# Patient Record
Sex: FEMALE | Race: WHITE | Employment: OTHER | URBAN - METROPOLITAN AREA
[De-identification: names, ages, dates, MRNs, and addresses within clinical notes are randomized per-mention and may not be internally consistent; named-entity substitution may affect disease eponyms.]

---

## 2017-02-13 ENCOUNTER — APPOINTMENT (OUTPATIENT)
Dept: CT IMAGING | Age: 82
End: 2017-02-13
Attending: EMERGENCY MEDICINE
Payer: MEDICARE

## 2017-02-13 ENCOUNTER — HOSPITAL ENCOUNTER (EMERGENCY)
Age: 82
Discharge: HOME | End: 2017-02-14
Attending: EMERGENCY MEDICINE
Payer: MEDICARE

## 2017-02-13 DIAGNOSIS — Z23 IMMUNIZATION, TETANUS TOXOID: ICD-10-CM

## 2017-02-13 DIAGNOSIS — S01.01XA SCALP LACERATION, INITIAL ENCOUNTER: Primary | ICD-10-CM

## 2017-02-13 PROCEDURE — 70450 CT HEAD/BRAIN W/O DYE: CPT

## 2017-02-13 PROCEDURE — 90471 IMMUNIZATION ADMIN: CPT | Performed by: EMERGENCY MEDICINE

## 2017-02-13 PROCEDURE — 77030008462 HC STPLR SKN PROX J&J -A

## 2017-02-13 PROCEDURE — 77030018836 HC SOL IRR NACL ICUM -A

## 2017-02-13 PROCEDURE — 99283 EMERGENCY DEPT VISIT LOW MDM: CPT | Performed by: EMERGENCY MEDICINE

## 2017-02-13 PROCEDURE — 75810000293 HC SIMP/SUPERF WND  RPR: Performed by: EMERGENCY MEDICINE

## 2017-02-14 VITALS
HEART RATE: 78 BPM | OXYGEN SATURATION: 100 % | BODY MASS INDEX: 27.96 KG/M2 | SYSTOLIC BLOOD PRESSURE: 135 MMHG | WEIGHT: 168 LBS | DIASTOLIC BLOOD PRESSURE: 66 MMHG | RESPIRATION RATE: 16 BRPM | TEMPERATURE: 97.5 F

## 2017-02-14 PROCEDURE — 90715 TDAP VACCINE 7 YRS/> IM: CPT | Performed by: EMERGENCY MEDICINE

## 2017-02-14 PROCEDURE — 74011250636 HC RX REV CODE- 250/636: Performed by: EMERGENCY MEDICINE

## 2017-02-14 PROCEDURE — 75810000293 HC SIMP/SUPERF WND  RPR: Performed by: EMERGENCY MEDICINE

## 2017-02-14 RX ADMIN — TETANUS TOXOID, REDUCED DIPHTHERIA TOXOID AND ACELLULAR PERTUSSIS VACCINE, ADSORBED 0.5 ML: 5; 2.5; 8; 8; 2.5 SUSPENSION INTRAMUSCULAR at 00:05

## 2017-02-14 NOTE — DISCHARGE INSTRUCTIONS
Cuts Closed With Staples: Care Instructions  Your Care Instructions  A cut can happen anywhere on your body. The doctor used staples to close the cut. Staples easily and quickly close a cut, which helps the cut heal.  Sometimes a cut can injure tendons, blood vessels, or nerves. If the cut went deep and through the skin, the doctor may have put in a layer of stitches below the staples. The deeper layer of stitches brings the deep part of the cut together. These stitches will dissolve and don't need to be removed. The staples in the upper layer are what you see on the cut. You may have a bandage. You will need to have the staples removed, usually in 7 to 14 days. The doctor has checked you carefully, but problems can develop later. If you notice any problems or new symptoms, get medical treatment right away. Follow-up care is a key part of your treatment and safety. Be sure to make and go to all appointments, and call your doctor if you are having problems. It's also a good idea to know your test results and keep a list of the medicines you take. How can you care for yourself at home? · Keep the cut dry for the first 24 to 48 hours. After this, you can shower if your doctor okays it. Pat the cut dry. · Don't soak the cut, such as in a bathtub. Your doctor will tell you when it's safe to get the cut wet. · If your doctor told you how to care for your cut, follow your doctor's instructions. If you did not get instructions, follow this general advice:  ¨ After the first 24 to 48 hours, wash around the cut with clean water 2 times a day. Don't use hydrogen peroxide or alcohol, which can slow healing. ¨ You may cover the cut with a thin layer of petroleum jelly, such as Vaseline, and a nonstick bandage. ¨ Apply more petroleum jelly and replace the bandage as needed. · Avoid any activity that could cause your cut to reopen. · Do not remove the staples on your own.  Your doctor will tell you when to come back to have the staples removed. · Take pain medicines exactly as directed. ¨ If the doctor gave you a prescription medicine for pain, take it as prescribed. ¨ If you are not taking a prescription pain medicine, ask your doctor if you can take an over-the-counter medicine. When should you call for help? Call your doctor now or seek immediate medical care if:  · You have new pain, or your pain gets worse. · The skin near the cut is cold or pale or changes color. · You have tingling, weakness, or numbness near the cut. · The cut starts to bleed, and blood soaks through the bandage. Oozing small amounts of blood is normal.  · You have trouble moving the area near the cut. · You have symptoms of infection, such as:  ¨ Increased pain, swelling, warmth, or redness around the cut. ¨ Red streaks leading from the cut. ¨ Pus draining from the cut. ¨ A fever. Watch closely for changes in your health, and be sure to contact your doctor if:  · You do not get better as expected. Where can you learn more? Go to http://wilfredo-abhishek.info/. Enter X322 in the search box to learn more about \"Cuts Closed With Staples: Care Instructions. \"  Current as of: May 27, 2016  Content Version: 11.1  © 8316-7559 [employer]. Care instructions adapted under license by [employer] (which disclaims liability or warranty for this information). If you have questions about a medical condition or this instruction, always ask your healthcare professional. [place] [address] any warranty or liability for your use of this information.

## 2017-02-14 NOTE — ED NOTES
Discharge instructions discussed and given:  Pt advised understanding of instructions. Pt ambulated with daughter from ER.

## 2017-02-14 NOTE — ED TRIAGE NOTES
Pt reports hitting her head on the edge of the fireplace. Pt with laceration to right side of head. Pt bleeding in triage.     Brook Fleischer, RN

## 2017-02-14 NOTE — ED PROVIDER NOTES
HPI Comments: Patient states that around 8 PM she fell up against a fireplace and sustained a laceration to her scalp. She denies any loss of consciousness or dizziness or confusion. There was a moderate amount of bleeding which is now controlled. She has some mild pain at the site of the cut but otherwise has no pain. She cannot remember when her last tetanus shot was. Elements of this note were created using speech recognition software. As such, there may be errors of speech recognition present. Patient is a 80 y.o. female presenting with fall. The history is provided by the patient. Fall   Pertinent negatives include no fever, no nausea and no vomiting. Past Medical History:   Diagnosis Date    Anemia      EGD 10-3-12:  schatzki's ring, duodenal ulcer, gastritis. Colonoscopy 10-3-12: diverticulosis, colon polyp. repeat colonoscopy 10 years    HTN (hypertension), benign     Hypercholesteremia     Migraine        Past Surgical History:   Procedure Laterality Date    Hx hysterectomy      Hx refractive surgery      Hx back surgery      Hx orthopaedic       right wrist, left shoulder    Hx tonsillectomy      Endoscopy, colon, diagnostic  10/3/12     diverticulosis, colon polyp    Hx appendectomy           Family History:   Problem Relation Age of Onset    Cancer Sister      breast       Social History     Social History    Marital status:      Spouse name: N/A    Number of children: N/A    Years of education: N/A     Occupational History    Not on file. Social History Main Topics    Smoking status: Never Smoker    Smokeless tobacco: Never Used    Alcohol use No    Drug use: No    Sexual activity: Not on file     Other Topics Concern    Not on file     Social History Narrative    No family/social/cultural issues pertinent to care.  May 2015         ALLERGIES: Codeine and Penicillins    Review of Systems   Constitutional: Negative for chills and fever. Gastrointestinal: Negative for nausea and vomiting. All other systems reviewed and are negative. Vitals:    02/13/17 2136   BP: 158/80   Pulse: 71   Resp: 16   Temp: 97.8 °F (36.6 °C)   SpO2: 98%   Weight: 76.2 kg (168 lb)            Physical Exam   Constitutional: She is oriented to person, place, and time. She appears well-developed and well-nourished. HENT:   Head: Normocephalic. 3 cm linear laceration right parietal region as indicated   Eyes: Conjunctivae are normal. Pupils are equal, round, and reactive to light. Neck: Normal range of motion. Neck supple. Musculoskeletal: She exhibits no edema or tenderness. Neurological: She is alert and oriented to person, place, and time. Skin: Skin is warm and dry. Psychiatric: She has a normal mood and affect. Her behavior is normal.   Nursing note and vitals reviewed. MDM  Number of Diagnoses or Management Options  Immunization, tetanus toxoid:   Scalp laceration, initial encounter: new and does not require workup  Diagnosis management comments: Differential diagnoses: Laceration, skull fracture, traumatic intracranial bleed       Amount and/or Complexity of Data Reviewed  Tests in the radiology section of CPT®: ordered and reviewed  Independent visualization of images, tracings, or specimens: yes    Risk of Complications, Morbidity, and/or Mortality  Presenting problems: moderate  Diagnostic procedures: moderate  Management options: moderate    Patient Progress  Patient progress: improved    ED Course       Wound Repair  Date/Time: 2/14/2017 12:12 AM  Performed by: attendingPreparation: skin prepped with ChloraPrep  Pre-procedure re-eval: Immediately prior to the procedure, the patient was reevaluated and found suitable for the planned procedure and any planned medications. Time out: Immediately prior to the procedure a time out was called to verify the correct patient, procedure, equipment, staff and marking as appropriate. .  Location details: scalp  Wound length:2.6 - 7.5 cm  Foreign bodies: no foreign bodies  Irrigation solution: saline  Irrigation method: syringe  Debridement: none  Skin closure: staples  Number of sutures: 2  Technique: simple  Approximation: close  Dressing: 4x4 and antibiotic ointment  Patient tolerance: Patient tolerated the procedure well with no immediate complications  My total time at bedside, performing this procedure was 1-15 minutes.

## 2017-02-15 ENCOUNTER — PATIENT OUTREACH (OUTPATIENT)
Dept: CASE MANAGEMENT | Age: 82
End: 2017-02-15

## 2017-02-15 NOTE — PROGRESS NOTES
ZENIA #1. ED D/C 2/14/17. Attempted call to patient no answer left vmail requesting return call. Scheduled f/u call for 2/16/17. Benjamin Orourke, GISELE/ Care Coordinator  [address] [place]  / [place], [address] [facility]  [email or web address]     This note will not be viewable in [employer]Thart.

## 2017-02-16 ENCOUNTER — PATIENT OUTREACH (OUTPATIENT)
Dept: CASE MANAGEMENT | Age: 82
End: 2017-02-16

## 2017-02-16 NOTE — PROGRESS NOTES
ZENIA #2. Attempted call to patient no answer left vmail requesting return call. 2nd number is disconnected. Scheduled f/u call for 2/17/17. Page GISELE Rollins/ Care Coordinator  [address]. [place] [address] / [place], [address] [facility]  [email or web address]  This note will not be viewable in [employer].

## 2018-07-17 ENCOUNTER — HOSPITAL ENCOUNTER (OUTPATIENT)
Dept: PHYSICAL THERAPY | Age: 83
Discharge: HOME | End: 2018-07-17
Payer: MEDICARE

## 2018-07-17 ENCOUNTER — HOSPITAL ENCOUNTER (OUTPATIENT)
Dept: SURGERY | Age: 83
Discharge: HOME | End: 2018-07-17
Payer: MEDICARE

## 2018-07-17 ENCOUNTER — ANESTHESIA EVENT (OUTPATIENT)
Dept: SURGERY | Age: 83
DRG: 470 | End: 2018-07-17
Payer: MEDICARE

## 2018-07-17 VITALS
HEIGHT: 66 IN | SYSTOLIC BLOOD PRESSURE: 141 MMHG | OXYGEN SATURATION: 96 % | RESPIRATION RATE: 16 BRPM | DIASTOLIC BLOOD PRESSURE: 72 MMHG | BODY MASS INDEX: 28.93 KG/M2 | WEIGHT: 180 LBS | TEMPERATURE: 95.8 F | HEART RATE: 63 BPM

## 2018-07-17 LAB
ANION GAP SERPL CALC-SCNC: 11 MMOL/L (ref 7–16)
APPEARANCE UR: ABNORMAL
APTT PPP: 30.2 SEC (ref 23.2–35.3)
ATRIAL RATE: 59 BPM
BACTERIA SPEC CULT: NORMAL
BACTERIA URNS QL MICRO: 0 /HPF
BILIRUB UR QL: NEGATIVE
BUN SERPL-MCNC: 16 MG/DL (ref 8–23)
CALCIUM SERPL-MCNC: 8.9 MG/DL (ref 8.3–10.4)
CALCULATED P AXIS, ECG09: 51 DEGREES
CALCULATED R AXIS, ECG10: 49 DEGREES
CALCULATED T AXIS, ECG11: -16 DEGREES
CASTS URNS QL MICRO: ABNORMAL /LPF
CHLORIDE SERPL-SCNC: 105 MMOL/L (ref 98–107)
CO2 SERPL-SCNC: 24 MMOL/L (ref 21–32)
COLOR UR: YELLOW
CREAT SERPL-MCNC: 1.13 MG/DL (ref 0.6–1)
DIAGNOSIS, 93000: NORMAL
EPI CELLS #/AREA URNS HPF: ABNORMAL /HPF
ERYTHROCYTE [DISTWIDTH] IN BLOOD BY AUTOMATED COUNT: 15.5 % (ref 11.9–14.6)
GLUCOSE SERPL-MCNC: 178 MG/DL (ref 65–100)
GLUCOSE UR STRIP.AUTO-MCNC: NEGATIVE MG/DL
HCT VFR BLD AUTO: 40.6 % (ref 35.8–46.3)
HGB BLD-MCNC: 13 G/DL (ref 11.7–15.4)
HGB UR QL STRIP: NEGATIVE
INR PPP: 1
KETONES UR QL STRIP.AUTO: NEGATIVE MG/DL
LEUKOCYTE ESTERASE UR QL STRIP.AUTO: ABNORMAL
MCH RBC QN AUTO: 27.7 PG (ref 26.1–32.9)
MCHC RBC AUTO-ENTMCNC: 32 G/DL (ref 31.4–35)
MCV RBC AUTO: 86.4 FL (ref 79.6–97.8)
NITRITE UR QL STRIP.AUTO: NEGATIVE
P-R INTERVAL, ECG05: 210 MS
PH UR STRIP: 5 [PH] (ref 5–9)
PLATELET # BLD AUTO: 235 K/UL (ref 150–450)
PMV BLD AUTO: 11.2 FL (ref 10.8–14.1)
POTASSIUM SERPL-SCNC: 3.9 MMOL/L (ref 3.5–5.1)
PROT UR STRIP-MCNC: ABNORMAL MG/DL
PROTHROMBIN TIME: 12.9 SEC (ref 11.5–14.5)
Q-T INTERVAL, ECG07: 436 MS
QRS DURATION, ECG06: 88 MS
QTC CALCULATION (BEZET), ECG08: 431 MS
RBC # BLD AUTO: 4.7 M/UL (ref 4.05–5.25)
RBC #/AREA URNS HPF: ABNORMAL /HPF
SERVICE CMNT-IMP: NORMAL
SODIUM SERPL-SCNC: 140 MMOL/L (ref 136–145)
SP GR UR REFRACTOMETRY: 1.03 (ref 1–1.02)
UROBILINOGEN UR QL STRIP.AUTO: 0.2 EU/DL (ref 0.2–1)
VENTRICULAR RATE, ECG03: 59 BPM
WBC # BLD AUTO: 8 K/UL (ref 4.3–11.1)
WBC URNS QL MICRO: ABNORMAL /HPF

## 2018-07-17 PROCEDURE — 81001 URINALYSIS AUTO W/SCOPE: CPT | Performed by: ORTHOPAEDIC SURGERY

## 2018-07-17 PROCEDURE — 77030027138 HC INCENT SPIROMETER -A

## 2018-07-17 PROCEDURE — G8980 MOBILITY D/C STATUS: HCPCS

## 2018-07-17 PROCEDURE — 36415 COLL VENOUS BLD VENIPUNCTURE: CPT | Performed by: ORTHOPAEDIC SURGERY

## 2018-07-17 PROCEDURE — 80048 BASIC METABOLIC PNL TOTAL CA: CPT | Performed by: ORTHOPAEDIC SURGERY

## 2018-07-17 PROCEDURE — 93005 ELECTROCARDIOGRAM TRACING: CPT | Performed by: ORTHOPAEDIC SURGERY

## 2018-07-17 PROCEDURE — 85027 COMPLETE CBC AUTOMATED: CPT | Performed by: ORTHOPAEDIC SURGERY

## 2018-07-17 PROCEDURE — 87641 MR-STAPH DNA AMP PROBE: CPT | Performed by: ORTHOPAEDIC SURGERY

## 2018-07-17 PROCEDURE — 85610 PROTHROMBIN TIME: CPT | Performed by: ORTHOPAEDIC SURGERY

## 2018-07-17 PROCEDURE — 97161 PT EVAL LOW COMPLEX 20 MIN: CPT

## 2018-07-17 PROCEDURE — 85730 THROMBOPLASTIN TIME PARTIAL: CPT | Performed by: ORTHOPAEDIC SURGERY

## 2018-07-17 PROCEDURE — G8978 MOBILITY CURRENT STATUS: HCPCS

## 2018-07-17 PROCEDURE — G8979 MOBILITY GOAL STATUS: HCPCS

## 2018-07-17 NOTE — PROGRESS NOTES
07/17/18 1200   Oxygen Therapy   O2 Sat (%) 96 %   Pulse via Oximetry 68 beats per minute   O2 Device Room air   Pre-Treatment   Breath Sounds Bilateral Clear   Pre FEV1 (liters) 1.5 liters   % Predicted 78   Incentive Spirometry Treatment   Actual Volume (ml) 1200 ml     Initial respiratory Assessment completed with pt. Pt was interviewed and evaluated in Joint camp prior to surgery. Patient ID:  Essie Alpers  [id number]  89 y.o.  11/25/1928  Surgeon: Dr. Wilmer Cosby  Date of Surgery: 8/7/2018  Procedure: Total Left Hip Arthroplasty  Primary Care Physician: Bashir Guallpa -[phone]  Specialists:                                  Pt instructed in the use of Incentive Spirometry. Pt instructed to bring Incentive Spirometer back on date of surgery & to start using Is upon return to pt room. Pt taught proper cough technique    History of smoking:   NONE                                                       Quit date:           Secondhand smoke:BROTHER      Past procedures with Oxygen desaturation:NONE    Past Medical History:   Diagnosis Date    Anemia     EGD 10-3-12:  schatzki's ring, duodenal ulcer, gastritis. Colonoscopy 10-3-12: diverticulosis, colon polyp.   repeat colonoscopy 10 years    HTN (hypertension), benign     Hypercholesteremia     Migraine                                                                                                                                                      Respiratory history:DENIES SOB                                                                  Respiratory meds:                                         FAMILY PRESENT:                                                            NO                                        PAST SLEEP STUDY:                          NO  HX OF RAKESH:                                           NO                                     RAKESH assessment:                                               SLEEPS ON SIDE PHYSICAL EXAM   Body mass index is 29.05 kg/(m^2).    Visit Vitals    /72 (BP 1 Location: Left arm, BP Patient Position: Sitting)    Pulse 63    Temp 95.8 °F (35.4 °C)    Resp 16    Ht 5' 6\" (1.676 m)    Wt 81.6 kg (180 lb)    SpO2 96%    BMI 29.05 kg/m2     Neck circumference:  38.5    cm    Loud snoring:                                     DENIES    Witnessed apnea or wakening gasping or choking:,             DENIES,                                                                                                  Awakens with headaches:                                                  DENIES- USED TO HAVE MIGRAINES    Morning or daytime tiredness/ sleepiness:                    DENIES                                                                                        TIRED   Dry mouth or sore throat in morning:                                                                                       DENIES    [place] stage:  4    SACS score:5    STOP/BAN                              CPAP:                       NONE                                                  NONE  Referrals:    Pt. Phone Number:

## 2018-07-17 NOTE — ANESTHESIA PREPROCEDURE EVALUATION
Anesthetic History   No history of anesthetic complications            Review of Systems / Medical History  Patient summary reviewed and pertinent labs reviewed    Pulmonary  Within defined limits                 Neuro/Psych         Headaches and psychiatric history (Depression)     Cardiovascular    Hypertension: well controlled          Hyperlipidemia    Exercise tolerance: <4 METS: Limited by knee pain  Comments: Nml stress and ECHO 8/2018  Denies CP, SOB or changes in functional status   GI/Hepatic/Renal     GERD: well controlled           Endo/Other        Arthritis     Other Findings            Physical Exam    Airway  Mallampati: II  TM Distance: 4 - 6 cm  Neck ROM: normal range of motion   Mouth opening: Diminished (comment)     Cardiovascular  Regular rate and rhythm,  S1 and S2 normal,  no murmur, click, rub, or gallop  Rhythm: regular  Rate: normal         Dental    Dentition: Upper partial plate     Pulmonary  Breath sounds clear to auscultation               Abdominal  GI exam deferred       Other Findings            Anesthetic Plan    ASA: 3  Anesthesia type: spinal            Anesthetic plan and risks discussed with: Patient and Son / Daughter

## 2018-07-17 NOTE — PERIOP NOTES
Recent Results (from the past 12 hour(s))   CBC W/O DIFF    Collection Time: 07/17/18 11:45 AM   Result Value Ref Range    WBC 8.0 4.3 - 11.1 K/uL    RBC 4.70 4.05 - 5.25 M/uL    HGB 13.0 11.7 - 15.4 g/dL    HCT 40.6 35.8 - 46.3 %    MCV 86.4 79.6 - 97.8 FL    MCH 27.7 26.1 - 32.9 PG    MCHC 32.0 31.4 - 35.0 g/dL    RDW 15.5 (H) 11.9 - 14.6 %    PLATELET 092 704 - 441 K/uL    MPV 11.2 10.8 - 46.6 FL   METABOLIC PANEL, BASIC    Collection Time: 07/17/18 11:45 AM   Result Value Ref Range    Sodium 140 136 - 145 mmol/L    Potassium 3.9 3.5 - 5.1 mmol/L    Chloride 105 98 - 107 mmol/L    CO2 24 21 - 32 mmol/L    Anion gap 11 7 - 16 mmol/L    Glucose 178 (H) 65 - 100 mg/dL    BUN 16 8 - 23 MG/DL    Creatinine 1.13 (H) 0.6 - 1.0 MG/DL    GFR est AA 58 (L) >60 ml/min/1.73m2    GFR est non-AA 48 (L) >60 ml/min/1.73m2    Calcium 8.9 8.3 - 10.4 MG/DL   PROTHROMBIN TIME + INR    Collection Time: 07/17/18 11:45 AM   Result Value Ref Range    Prothrombin time 12.9 11.5 - 14.5 sec    INR 1.0     PTT    Collection Time: 07/17/18 11:45 AM   Result Value Ref Range    aPTT 30.2 23.2 - 35.3 SEC   URINALYSIS W/ RFLX MICROSCOPIC    Collection Time: 07/17/18 11:45 AM   Result Value Ref Range    Color YELLOW      Appearance CLOUDY      Specific gravity 1.026 (H) 1.001 - 1.023      pH (UA) 5.0 5.0 - 9.0      Protein TRACE (A) NEG mg/dL    Glucose NEGATIVE  mg/dL    Ketone NEGATIVE  NEG mg/dL    Bilirubin NEGATIVE  NEG      Blood NEGATIVE  NEG      Urobilinogen 0.2 0.2 - 1.0 EU/dL    Nitrites NEGATIVE  NEG      Leukocyte Esterase SMALL (A) NEG      WBC 10-20 0 /hpf    RBC 0-3 0 /hpf    Epithelial cells 5-10 0 /hpf    Bacteria 0 0 /hpf    Casts 5-10 0 /lpf   EKG, 12 LEAD, INITIAL    Collection Time: 07/17/18 12:43 PM   Result Value Ref Range    Ventricular Rate 59 BPM    Atrial Rate 59 BPM    P-R Interval 210 ms    QRS Duration 88 ms    Q-T Interval 436 ms    QTC Calculation (Bezet) 431 ms    Calculated P Axis 51 degrees    Calculated R Axis 49 degrees    Calculated T Axis -16 degrees    Diagnosis       Sinus bradycardia with 1st degree A-V block  ST & T wave abnormality, consider anterolateral ischemia  Abnormal ECG  No previous ECGs available  Confirmed by ST SHABANA NICHOLS MD (), CHARLOTTE PEREZ ([id number]) on 7/17/2018 2:16:21 PM

## 2018-07-17 NOTE — PROGRESS NOTES
Lino Zambrano : 1928(89 y.o.) [address] at [address], [address] [place] [address] [phone] Physical Therapy Prehab Plan of Treatment and Evaluation Summary:2018 ICD-10: Treatment Diagnosis:  
· Pain in left hip (M25.552) · Stiffness of Left Hip, Not elsewhere classified (M25.652) · Difficulty in walking, Not elsewhere classified (R26.2) Precautions/Allergies:  
Codeine and Penicillins  MEDICAL/REFERRING DIAGNOSIS: 
Unilateral primary osteoarthritis, left hip [M16.12] REFERRING PHYSICIAN: Lyla Redd,* 
DATE OF SURGERY: 18 Assessment:  
Comments:  Pt. Lives alone and daughter present with her today. She is independent at home, even cuts her own grass. She wants to go home and has friends who can come help her. Her daughter works but can be there at night with her. She has some left knee swelling and wears a compression sleeve as she reports most of her pain is referred into the knee. PROBLEM LIST (Impacting functional limitations): Ms. Reggie Guthrie presents with the following left lower extremity(s) problems: 1. Strength 2. Range of Motion 3. Home Exercise Program 
4. Pain INTERVENTIONS PLANNED: 
1. Home Exercise Program 
2. Educational Discussion TREATMENT PLAN: Effective Dates: 2018 TO 2018. Frequency/Duration: Patient to continue to perform home exercise program at least twice per day up until her surgery. GOALS: (Goals have been discussed and agreed upon with patient.) Discharge Goals: Time Frame: 1 Day 1. Patient will demonstrate independence with a home exercise program designed to increase strength, range of motion and pain control to minimize functional deficits and optimize patient for total joint replacement. Rehabilitation Potential For Stated Goals: Good Regarding Collin Reagan therapy, I certify that the treatment plan above will be carried out by a therapist or under their direction. Thank you for this referral, 
Micki Zafar, PT     
    
 
 
HISTORY:  
Present Symptoms: 
Pain Intensity 1:  (6 at worst) Pain Location 1: Hip, Knee History of Present Injury/Illness (Reason for Referral): 
Medical/Referring Diagnosis: Unilateral primary osteoarthritis, left hip [M16.12] Past Medical History/Comorbidities: Ms. Reyes Dumont  has a past medical history of Anemia; HTN (hypertension), benign; Hypercholesteremia; and Migraine. Ms. Reyes Dumont  has a past surgical history that includes hx hysterectomy; hx refractive surgery; hx back surgery; hx orthopaedic; hx tonsillectomy; endoscopy, colon, diagnostic (10/3/12); and hx appendectomy. Social History/Living Environment:  
Home Environment: Private residence # Steps to Enter: 0 One/Two Story Residence: One story Living Alone: Yes Support Systems: Friends \ neighbors, Child(luciano) Patient Expects to be Discharged to[de-identified] Private residence Current DME Used/Available at Home: Cane, straight, Tub transfer bench (to [facility] and [place]) Tub or Shower Type: Tub/Shower combination Work/Activity: 
retired Dominant Side: LEFT Current Medications:  See Pre-assessment nursing note Number of Personal Factors/Comorbidities that affect the Plan of Care: 1-2: MODERATE COMPLEXITY EXAMINATION:  
ADLs (Current Functional Status):  
Ambulation: 
[x] Independent 
[] Walk Indoors Only 
[] Walk Outdoors [] Use Assistive Device [] Use Wheelchair Only Dressing: 
[x] Independent Requires Assistance from Someone for: 
[] Sock/Shoes 
[] Pants 
[] Everything Bathing/Showering:  
[x] Independent 
[] Requires Assistance from Someone 
[] Sponge Bath Only Household Activities: 
[x] Routine house and yard work 
[] Light Housework Only 
[] None Observation/Orthostatic Postural Assessment:  
Exceptions to WDLRounded shoulders ROM/Flexibility:  
Gross Assessment: Yes AROM: Within functional limits (right LE) LLE Assessment LLE Assessment (WDL): Exception to WDL (left hip <3/5) LLE AROM 
L Hip Flexion: 100 L Hip ABduction: 20 L Knee Extension: 5 Strength:  
Gross Assessment: Yes 
Strength: Generally decreased, functional (right LE) Functional Mobility:   
Gross Assessment: Yes 
 
Gait Description (WDL): Exceptions to [facility] Stand to Sit: Additional time, Independent Sit to Stand: Independent, Additional time Distance (ft): 250 Feet (ft) Ambulation - Level of Assistance: Independent Speed/Rashmi: Delayed Step Length: Left shortened;Right shortened Stance: Left decreased Gait Abnormalities: Antalgic;Trunk sway increased Balance:   
Sitting: Intact Standing: Intact Body Structures Involved: 1. Bones 2. Joints 3. Muscles 4. Ligaments Body Functions Affected: 1. Movement Related Activities and Participation Affected: 1. Mobility Number of elements that affect the Plan of Care: 3: MODERATE COMPLEXITY CLINICAL PRESENTATION:  
Presentation: Stable and uncomplicated: LOW COMPLEXITY CLINICAL DECISION MAKING:  
Outcome Measure: Tool Used: Lower Extremity Functional Scale (LEFS) Score:  Initial: 47/80 Most Recent: X/80 (Date: -- ) Interpretation of Score: 20 questions each scored on a 5 point scale with 0 representing \"extreme difficulty or unable to perform\" and 4 representing \"no difficulty\". The lower the score, the greater the functional disability. 80/80 represents no disability. Minimal detectable change is 9 points. Score 80 79-65 64-49 48-33 32-17 16-1 0 Modifier CH CI CJ CK CL CM CN  
 
? Mobility - Walking and Moving Around:  
  - CURRENT STATUS: CK - 40%-59% impaired, limited or restricted  - GOAL STATUS: CK - 40%-59% impaired, limited or restricted  - D/C STATUS:  CK - 40%-59% impaired, limited or restricted Medical Necessity:  
· Ms. Gregg Atkinson is expected to optimize her lower extremity strength and ROM in preparation for joint replacement surgery.  
Reason for Services/Other Comments: · Achieve baseline assesment of musculoskeletal system, functional mobility and home environment. , educate in PT HEP in preparation for surgery, educate in hospital plan of care. Use of outcome tool(s) and clinical judgement create a POC that gives a: Clear prediction of patient's progress: LOW COMPLEXITY  
TREATMENT:  
Treatment/Session Assessment:  Patient was instructed in PT- HEP to increase strength and ROM in LEs. Answered all questions. · Post session pain:  Hip and knee · Compliance with Program/Exercises: compliant most of the time. Total Treatment Duration: PT Patient Time In/Time Out Time In: 1200 Time Out: 1212 Franklyn Verdugo, PT

## 2018-07-17 NOTE — PERIOP NOTES
Recent Results (from the past 12 hour(s))   CBC W/O DIFF    Collection Time: 07/17/18 11:45 AM   Result Value Ref Range    WBC 8.0 4.3 - 11.1 K/uL    RBC 4.70 4.05 - 5.25 M/uL    HGB 13.0 11.7 - 15.4 g/dL    HCT 40.6 35.8 - 46.3 %    MCV 86.4 79.6 - 97.8 FL    MCH 27.7 26.1 - 32.9 PG    MCHC 32.0 31.4 - 35.0 g/dL    RDW 15.5 (H) 11.9 - 14.6 %    PLATELET 051 849 - 692 K/uL    MPV 11.2 10.8 - 51.5 FL   METABOLIC PANEL, BASIC    Collection Time: 07/17/18 11:45 AM   Result Value Ref Range    Sodium 140 136 - 145 mmol/L    Potassium 3.9 3.5 - 5.1 mmol/L    Chloride 105 98 - 107 mmol/L    CO2 24 21 - 32 mmol/L    Anion gap 11 7 - 16 mmol/L    Glucose 178 (H) 65 - 100 mg/dL    BUN 16 8 - 23 MG/DL    Creatinine 1.13 (H) 0.6 - 1.0 MG/DL    GFR est AA 58 (L) >60 ml/min/1.73m2    GFR est non-AA 48 (L) >60 ml/min/1.73m2    Calcium 8.9 8.3 - 10.4 MG/DL   PROTHROMBIN TIME + INR    Collection Time: 07/17/18 11:45 AM   Result Value Ref Range    Prothrombin time 12.9 11.5 - 14.5 sec    INR 1.0     PTT    Collection Time: 07/17/18 11:45 AM   Result Value Ref Range    aPTT 30.2 23.2 - 35.3 SEC   URINALYSIS W/ RFLX MICROSCOPIC    Collection Time: 07/17/18 11:45 AM   Result Value Ref Range    Color YELLOW      Appearance CLOUDY      Specific gravity 1.026 (H) 1.001 - 1.023      pH (UA) 5.0 5.0 - 9.0      Protein TRACE (A) NEG mg/dL    Glucose NEGATIVE  mg/dL    Ketone NEGATIVE  NEG mg/dL    Bilirubin NEGATIVE  NEG      Blood NEGATIVE  NEG      Urobilinogen 0.2 0.2 - 1.0 EU/dL    Nitrites NEGATIVE  NEG      Leukocyte Esterase SMALL (A) NEG      WBC 10-20 0 /hpf    RBC 0-3 0 /hpf    Epithelial cells 5-10 0 /hpf    Bacteria 0 0 /hpf    Casts 5-10 0 /lpf   EKG, 12 LEAD, INITIAL    Collection Time: 07/17/18 12:43 PM   Result Value Ref Range    Ventricular Rate 59 BPM    Atrial Rate 59 BPM    P-R Interval 210 ms    QRS Duration 88 ms    Q-T Interval 436 ms    QTC Calculation (Bezet) 431 ms    Calculated P Axis 51 degrees    Calculated R Axis 49 degrees    Calculated T Axis -16 degrees    Diagnosis       Sinus bradycardia with 1st degree A-V block  ST & T wave abnormality, consider anterolateral ischemia  Abnormal ECG  No previous ECGs available  Confirmed by ST SHABANA NICHOLS MD (), CHARLOTTE PEREZ ([id number]) on 7/17/2018 2:16:21 PM

## 2018-07-17 NOTE — PERIOP NOTES
Patient verified name, , and surgery as listed in [facility] [place]. [facility], Joint assessment complete. Labs per surgeon: cbc,bmp,pt,ptt,ua ; results within limits except elevated glucose:Dr Dary Butler, anesthesiologist reviewed and instructs to check FBS on DOS. MSSA not yet resulted. Labs per anesthesia protocol: included in surgeon's orders. EKG: today-abnormal.  Dr Dary Butler, anesthesiologist met with pt, reviewed EKG (no other prior EKG for comparison) : instructs for pt to see cardiologist for clearance prior to surgery. Called and notified [facility] at Dr Irene Starkey' office. Hibiclens and instructions to return bottle on DOS given per hospital policy. Nasal Swab collected per MD order and instructions for Mupirocin nasal ointment if required. Patient provided with handouts including Guide to Surgery, Pain Management, Hand Hygiene, Blood Transfusion Education, and [employer] Anesthesia Brochure. Patient answered medical/surgical history questions at their best of ability. All prior to admission medications documented in [facility]. Original medication prescription bottle not visualized during patient appointment. Patient instructed to hold all vitamins 7 days prior to surgery and NSAIDS 5 days prior to surgery. Medications to be held: none. Patient instructed to continue previous medications as prescribed prior to surgery and to take the following medications the day of surgery according to anesthesia guidelines with a small sip of water: carvedilol, aspirin 81 mg. Patient teach back successful and patient demonstrates knowledge of instruction.

## 2018-07-18 NOTE — PERIOP NOTES
7/17/2018  6:45 PM - Anjel, Lab In [employer]   Component Results   Component Value Flag Ref Range Units Status   Special Requests: NO SPECIAL REQUESTS     Final   Culture result:      Final   SA target not detected.                                 A MRSA NEGATIVE, SA NEGATIVE test result does not preclude MRSA or SA nasal colonization.

## 2018-07-26 NOTE — ADVANCED PRACTICE NURSE
Total Joint Surgery Preoperative Chart Review      Patient ID:  Feroz Youssef  [id number]  51 y.o.  11/25/1928  Surgeon: Dr. Ramirez Suárez  Date of Surgery: 8/7/2018  Procedure: Total Left Hip Arthroplasty  Primary Care Physician: Janeen Amezcua -[phone]  Specialty Physician(s):      Subjective:   Feroz Youssef is a 80 y.o. WHITE OR  female who presents for preoperative evaluation for Total Left Hip arthroplasty. This is a preoperative chart review note based on data collected by the nurse at the surgical Pre-Assessment visit. Past Medical History:   Diagnosis Date    Anemia     EGD 10-3-12:  schatzki's ring, duodenal ulcer, gastritis. Colonoscopy 10-3-12: diverticulosis, colon polyp. repeat colonoscopy 10 years    HTN (hypertension), benign     Hypercholesteremia     Migraine       Past Surgical History:   Procedure Laterality Date    ENDOSCOPY, COLON, DIAGNOSTIC  10/3/12    diverticulosis, colon polyp    HX APPENDECTOMY      HX BACK SURGERY      HX HYSTERECTOMY      HX ORTHOPAEDIC      right wrist, left shoulder    HX REFRACTIVE SURGERY      HX TONSILLECTOMY       Family History   Problem Relation Age of Onset    Cancer Sister      breast      Social History   Substance Use Topics    Smoking status: Never Smoker    Smokeless tobacco: Never Used    Alcohol use No       Prior to Admission medications    Medication Sig Start Date End Date Taking? Authorizing Provider   carvedilol (COREG) 6.25 mg tablet Take 1 Tab by mouth two (2) times daily (with meals). Patient taking differently: Take 6.25 mg by mouth two (2) times daily (with meals). Take morning of surgery per anesthesia guidelines. 5/31/18   Janeen Amezcua MD   DISABLED PLACARD ([address] [place] [address]) [place] handicapped tag -   Diagnosis - left knee arthritis M17.12 5/31/18   Janeen Amezcua MD   aspirin delayed-release 81 mg tablet Take  by mouth daily.     Historical Provider     Allergies   Allergen Reactions    Codeine Other (comments)     GI upset    Penicillins Rash          Objective:     Physical Exam:   Visit Vitals    /72 (BP 1 Location: Left arm, BP Patient Position: Sitting)    Pulse 63    Temp 95.8 °F (35.4 °C)    Resp 16    Ht 5' 6\" (1.676 m)    Wt 81.6 kg (180 lb)    SpO2 96%    BMI 29.05 kg/m2         ECG:    EKG Results     Procedure 720 Value Units Date/Time    EKG, 12 LEAD, INITIAL [[id number]] Collected:  07/17/18 1243    Order Status:  Completed Updated:  07/17/18 1416     Ventricular Rate 59 BPM      Atrial Rate 59 BPM      P-R Interval 210 ms      QRS Duration 88 ms      Q-T Interval 436 ms      QTC Calculation (Bezet) 431 ms      Calculated P Axis 51 degrees      Calculated R Axis 49 degrees      Calculated T Axis -16 degrees      Diagnosis --     Sinus bradycardia with 1st degree A-V block  ST & T wave abnormality, consider anterolateral ischemia  Abnormal ECG  No previous ECGs available  Confirmed by ST SHABANA NCIHOLS MD (), CHARLOTTE PEREZ ([id number]) on 7/17/2018 2:16:21 PM            Data Review:   Labs:   reviewed      Problem List:  )  Patient Active Problem List   Diagnosis Code    Anemia D64.9    HTN (hypertension), benign I10    Hypercholesteremia E78.00    Migraine G43.909    Insomnia G47.00    Depression F32.9       Total Joint Surgery Pre-Assessment Recommendations:        none       Signed By: LEILA Azar    July 26, 2018

## 2018-08-02 PROBLEM — R94.31 ABNORMAL EKG: Status: ACTIVE | Noted: 2018-08-02

## 2018-08-02 PROBLEM — E78.2 MIXED HYPERLIPIDEMIA: Status: ACTIVE | Noted: 2018-08-02

## 2018-08-02 PROBLEM — R01.1 SYSTOLIC MURMUR: Status: ACTIVE | Noted: 2018-08-02

## 2018-08-02 PROBLEM — I10 ESSENTIAL HYPERTENSION WITH GOAL BLOOD PRESSURE LESS THAN 130/85: Status: ACTIVE | Noted: 2018-08-02

## 2018-08-06 NOTE — PERIOP NOTES
Call from [facility] stating Luciana Rogers told her he had already addressed pt's clearance for surgery. Dada Alcala advised self to contact 's office. Spoke to [facility] @ 's office. [facility] states she received a faxed clearance note this afternoon from [address] [facility] and will forward to [phone]. Cardiac clearance note dated today (8/6/18) received that reads, \". .. Is considered LOW cardiovascular risk for this procedure. \"  Echo results from 8/3/18 in EMR and are within anesthesia protocols. Stress report not in EMR yet.

## 2018-08-07 ENCOUNTER — APPOINTMENT (OUTPATIENT)
Dept: GENERAL RADIOLOGY | Age: 83
DRG: 470 | End: 2018-08-07
Attending: PHYSICIAN ASSISTANT
Payer: MEDICARE

## 2018-08-07 ENCOUNTER — ANESTHESIA (OUTPATIENT)
Dept: SURGERY | Age: 83
DRG: 470 | End: 2018-08-07
Payer: MEDICARE

## 2018-08-07 ENCOUNTER — HOSPITAL ENCOUNTER (INPATIENT)
Age: 83
LOS: 3 days | Discharge: HOME HEALTH | DRG: 470 | End: 2018-08-10
Attending: ORTHOPAEDIC SURGERY | Admitting: ORTHOPAEDIC SURGERY
Payer: MEDICARE

## 2018-08-07 DIAGNOSIS — M16.12 ARTHRITIS OF LEFT HIP: Primary | ICD-10-CM

## 2018-08-07 DIAGNOSIS — I10 ESSENTIAL HYPERTENSION WITH GOAL BLOOD PRESSURE LESS THAN 130/85: ICD-10-CM

## 2018-08-07 LAB
ABO + RH BLD: NORMAL
BLOOD GROUP ANTIBODIES SERPL: NORMAL
GLUCOSE BLD STRIP.AUTO-MCNC: 116 MG/DL (ref 65–100)
HGB BLD-MCNC: 11.6 G/DL (ref 11.7–15.4)
SPECIMEN EXP DATE BLD: NORMAL

## 2018-08-07 PROCEDURE — 77030006777 HC BLD SAW OSC CNMD -B: Performed by: ORTHOPAEDIC SURGERY

## 2018-08-07 PROCEDURE — 77030034479 HC ADH SKN CLSR PRINEO J&J -B: Performed by: ORTHOPAEDIC SURGERY

## 2018-08-07 PROCEDURE — 77010033678 HC OXYGEN DAILY

## 2018-08-07 PROCEDURE — 85018 HEMOGLOBIN: CPT

## 2018-08-07 PROCEDURE — 65270000029 HC RM PRIVATE

## 2018-08-07 PROCEDURE — 97165 OT EVAL LOW COMPLEX 30 MIN: CPT

## 2018-08-07 PROCEDURE — 74011250636 HC RX REV CODE- 250/636: Performed by: ANESTHESIOLOGY

## 2018-08-07 PROCEDURE — 72170 X-RAY EXAM OF PELVIS: CPT

## 2018-08-07 PROCEDURE — 86580 TB INTRADERMAL TEST: CPT | Performed by: ORTHOPAEDIC SURGERY

## 2018-08-07 PROCEDURE — 77030006790 HC BLD SAW OSC ZIMM -B: Performed by: ORTHOPAEDIC SURGERY

## 2018-08-07 PROCEDURE — 74011000258 HC RX REV CODE- 258: Performed by: ORTHOPAEDIC SURGERY

## 2018-08-07 PROCEDURE — 74011000250 HC RX REV CODE- 250: Performed by: ORTHOPAEDIC SURGERY

## 2018-08-07 PROCEDURE — 77030002966 HC SUT PDS J&J -A: Performed by: ORTHOPAEDIC SURGERY

## 2018-08-07 PROCEDURE — 77030018547 HC SUT ETHBND1 J&J -B: Performed by: ORTHOPAEDIC SURGERY

## 2018-08-07 PROCEDURE — 77030034849: Performed by: ORTHOPAEDIC SURGERY

## 2018-08-07 PROCEDURE — 77030013727 HC IRR FAN PULSVC ZIMM -B: Performed by: ORTHOPAEDIC SURGERY

## 2018-08-07 PROCEDURE — 82962 GLUCOSE BLOOD TEST: CPT

## 2018-08-07 PROCEDURE — 86901 BLOOD TYPING SEROLOGIC RH(D): CPT

## 2018-08-07 PROCEDURE — 74011250637 HC RX REV CODE- 250/637: Performed by: ANESTHESIOLOGY

## 2018-08-07 PROCEDURE — 77030018836 HC SOL IRR NACL ICUM -A: Performed by: ORTHOPAEDIC SURGERY

## 2018-08-07 PROCEDURE — 97116 GAIT TRAINING THERAPY: CPT

## 2018-08-07 PROCEDURE — 76060000034 HC ANESTHESIA 1.5 TO 2 HR: Performed by: ORTHOPAEDIC SURGERY

## 2018-08-07 PROCEDURE — 77030007880 HC KT SPN EPDRL BBMI -B: Performed by: ANESTHESIOLOGY

## 2018-08-07 PROCEDURE — 74011250636 HC RX REV CODE- 250/636: Performed by: PHYSICIAN ASSISTANT

## 2018-08-07 PROCEDURE — 74011250636 HC RX REV CODE- 250/636

## 2018-08-07 PROCEDURE — 76010000162 HC OR TIME 1.5 TO 2 HR INTENSV-TIER 1: Performed by: ORTHOPAEDIC SURGERY

## 2018-08-07 PROCEDURE — C1776 JOINT DEVICE (IMPLANTABLE): HCPCS | Performed by: ORTHOPAEDIC SURGERY

## 2018-08-07 PROCEDURE — 77030018074 HC RTVR SUT ARTH4 S&N -B: Performed by: ORTHOPAEDIC SURGERY

## 2018-08-07 PROCEDURE — 97110 THERAPEUTIC EXERCISES: CPT

## 2018-08-07 PROCEDURE — 74011000302 HC RX REV CODE- 302: Performed by: ORTHOPAEDIC SURGERY

## 2018-08-07 PROCEDURE — 74011000250 HC RX REV CODE- 250

## 2018-08-07 PROCEDURE — 77030020782 HC GWN BAIR PAWS FLX 3M -B: Performed by: ANESTHESIOLOGY

## 2018-08-07 PROCEDURE — 0SRB02A REPLACEMENT OF LEFT HIP JOINT WITH METAL ON POLYETHYLENE SYNTHETIC SUBSTITUTE, UNCEMENTED, OPEN APPROACH: ICD-10-PCS | Performed by: ORTHOPAEDIC SURGERY

## 2018-08-07 PROCEDURE — 74011250637 HC RX REV CODE- 250/637: Performed by: PHYSICIAN ASSISTANT

## 2018-08-07 PROCEDURE — 97161 PT EVAL LOW COMPLEX 20 MIN: CPT

## 2018-08-07 PROCEDURE — 36415 COLL VENOUS BLD VENIPUNCTURE: CPT

## 2018-08-07 PROCEDURE — 77030003665 HC NDL SPN BBMI -A: Performed by: ANESTHESIOLOGY

## 2018-08-07 PROCEDURE — 99221 1ST HOSP IP/OBS SF/LOW 40: CPT | Performed by: PHYSICAL MEDICINE & REHABILITATION

## 2018-08-07 PROCEDURE — 94760 N-INVAS EAR/PLS OXIMETRY 1: CPT

## 2018-08-07 PROCEDURE — 77030002933 HC SUT MCRYL J&J -A: Performed by: ORTHOPAEDIC SURGERY

## 2018-08-07 PROCEDURE — 76210000006 HC OR PH I REC 0.5 TO 1 HR: Performed by: ORTHOPAEDIC SURGERY

## 2018-08-07 PROCEDURE — 74011250637 HC RX REV CODE- 250/637: Performed by: ORTHOPAEDIC SURGERY

## 2018-08-07 PROCEDURE — 74011250636 HC RX REV CODE- 250/636: Performed by: ORTHOPAEDIC SURGERY

## 2018-08-07 DEVICE — PINNACLE GRIPTION ACETABULAR SHELL MULTI-HOLE 56MM OD
Type: IMPLANTABLE DEVICE | Site: HIP | Status: FUNCTIONAL
Brand: PINNACLE GRIPTION

## 2018-08-07 DEVICE — PINNACLE HIP SOLUTIONS ALTRX POLYETHYLENE ACETABULAR LINER NEUTRAL 36MM ID 56MM OD
Type: IMPLANTABLE DEVICE | Site: HIP | Status: FUNCTIONAL
Brand: PINNACLE ALTRX

## 2018-08-07 DEVICE — STEM FEM SZ 8 L111MM 12/14 TAPR HI OFFSET HIP DUOFIX CLLRD: Type: IMPLANTABLE DEVICE | Site: HIP | Status: FUNCTIONAL

## 2018-08-07 DEVICE — HEAD FEM DIA36MM +1.5MM OFFSET 12/14 TAPR HIP CERAMIC: Type: IMPLANTABLE DEVICE | Site: HIP | Status: FUNCTIONAL

## 2018-08-07 DEVICE — PINNACLE CANCELLOUS BONE SCREW 6.5MM X 25MM
Type: IMPLANTABLE DEVICE | Site: HIP | Status: FUNCTIONAL
Brand: PINNACLE

## 2018-08-07 RX ORDER — HYDROMORPHONE HYDROCHLORIDE 2 MG/ML
1 INJECTION, SOLUTION INTRAMUSCULAR; INTRAVENOUS; SUBCUTANEOUS
Status: DISCONTINUED | OUTPATIENT
Start: 2018-08-07 | End: 2018-08-10 | Stop reason: HOSPADM

## 2018-08-07 RX ORDER — NALOXONE HYDROCHLORIDE 0.4 MG/ML
0.2 INJECTION, SOLUTION INTRAMUSCULAR; INTRAVENOUS; SUBCUTANEOUS AS NEEDED
Status: DISCONTINUED | OUTPATIENT
Start: 2018-08-07 | End: 2018-08-07 | Stop reason: HOSPADM

## 2018-08-07 RX ORDER — VANCOMYCIN HYDROCHLORIDE 1 G/20ML
INJECTION, POWDER, LYOPHILIZED, FOR SOLUTION INTRAVENOUS AS NEEDED
Status: DISCONTINUED | OUTPATIENT
Start: 2018-08-07 | End: 2018-08-07 | Stop reason: HOSPADM

## 2018-08-07 RX ORDER — OXYCODONE HYDROCHLORIDE 5 MG/1
10 TABLET ORAL
Status: DISCONTINUED | OUTPATIENT
Start: 2018-08-07 | End: 2018-08-07

## 2018-08-07 RX ORDER — CEFAZOLIN SODIUM/WATER 2 G/20 ML
2 SYRINGE (ML) INTRAVENOUS ONCE
Status: DISCONTINUED | OUTPATIENT
Start: 2018-08-07 | End: 2018-08-07 | Stop reason: HOSPADM

## 2018-08-07 RX ORDER — FENTANYL CITRATE 50 UG/ML
INJECTION, SOLUTION INTRAMUSCULAR; INTRAVENOUS AS NEEDED
Status: DISCONTINUED | OUTPATIENT
Start: 2018-08-07 | End: 2018-08-07 | Stop reason: HOSPADM

## 2018-08-07 RX ORDER — PROPOFOL 10 MG/ML
INJECTION, EMULSION INTRAVENOUS
Status: DISCONTINUED | OUTPATIENT
Start: 2018-08-07 | End: 2018-08-07 | Stop reason: HOSPADM

## 2018-08-07 RX ORDER — DEXAMETHASONE SODIUM PHOSPHATE 4 MG/ML
INJECTION, SOLUTION INTRA-ARTICULAR; INTRALESIONAL; INTRAMUSCULAR; INTRAVENOUS; SOFT TISSUE AS NEEDED
Status: DISCONTINUED | OUTPATIENT
Start: 2018-08-07 | End: 2018-08-07 | Stop reason: HOSPADM

## 2018-08-07 RX ORDER — CEFAZOLIN SODIUM 1 G/3ML
INJECTION, POWDER, FOR SOLUTION INTRAMUSCULAR; INTRAVENOUS AS NEEDED
Status: DISCONTINUED | OUTPATIENT
Start: 2018-08-07 | End: 2018-08-07 | Stop reason: HOSPADM

## 2018-08-07 RX ORDER — EPHEDRINE SULFATE 50 MG/ML
INJECTION, SOLUTION INTRAVENOUS AS NEEDED
Status: DISCONTINUED | OUTPATIENT
Start: 2018-08-07 | End: 2018-08-07 | Stop reason: HOSPADM

## 2018-08-07 RX ORDER — ZOLPIDEM TARTRATE 5 MG/1
5 TABLET ORAL
Status: DISCONTINUED | OUTPATIENT
Start: 2018-08-07 | End: 2018-08-10 | Stop reason: HOSPADM

## 2018-08-07 RX ORDER — KETOROLAC TROMETHAMINE 30 MG/ML
INJECTION, SOLUTION INTRAMUSCULAR; INTRAVENOUS AS NEEDED
Status: DISCONTINUED | OUTPATIENT
Start: 2018-08-07 | End: 2018-08-07 | Stop reason: HOSPADM

## 2018-08-07 RX ORDER — ONDANSETRON 2 MG/ML
INJECTION INTRAMUSCULAR; INTRAVENOUS AS NEEDED
Status: DISCONTINUED | OUTPATIENT
Start: 2018-08-07 | End: 2018-08-07 | Stop reason: HOSPADM

## 2018-08-07 RX ORDER — CARVEDILOL 6.25 MG/1
6.25 TABLET ORAL 2 TIMES DAILY WITH MEALS
Status: DISCONTINUED | OUTPATIENT
Start: 2018-08-07 | End: 2018-08-10 | Stop reason: HOSPADM

## 2018-08-07 RX ORDER — FENTANYL CITRATE 50 UG/ML
100 INJECTION, SOLUTION INTRAMUSCULAR; INTRAVENOUS ONCE
Status: DISCONTINUED | OUTPATIENT
Start: 2018-08-07 | End: 2018-08-07 | Stop reason: HOSPADM

## 2018-08-07 RX ORDER — SODIUM CHLORIDE 9 MG/ML
100 INJECTION, SOLUTION INTRAVENOUS CONTINUOUS
Status: DISPENSED | OUTPATIENT
Start: 2018-08-07 | End: 2018-08-09

## 2018-08-07 RX ORDER — OXYCODONE HYDROCHLORIDE 5 MG/1
5 TABLET ORAL
Status: DISCONTINUED | OUTPATIENT
Start: 2018-08-07 | End: 2018-08-07 | Stop reason: HOSPADM

## 2018-08-07 RX ORDER — ACETAMINOPHEN 500 MG
1000 TABLET ORAL ONCE
Status: COMPLETED | OUTPATIENT
Start: 2018-08-07 | End: 2018-08-07

## 2018-08-07 RX ORDER — AMOXICILLIN 250 MG
2 CAPSULE ORAL DAILY
Status: DISCONTINUED | OUTPATIENT
Start: 2018-08-08 | End: 2018-08-10 | Stop reason: HOSPADM

## 2018-08-07 RX ORDER — CEFAZOLIN SODIUM/WATER 2 G/20 ML
2 SYRINGE (ML) INTRAVENOUS EVERY 8 HOURS
Status: COMPLETED | OUTPATIENT
Start: 2018-08-07 | End: 2018-08-08

## 2018-08-07 RX ORDER — ACETAMINOPHEN 500 MG
1000 TABLET ORAL EVERY 6 HOURS
Status: DISCONTINUED | OUTPATIENT
Start: 2018-08-08 | End: 2018-08-10 | Stop reason: HOSPADM

## 2018-08-07 RX ORDER — HYDROMORPHONE HYDROCHLORIDE 2 MG/ML
0.5 INJECTION, SOLUTION INTRAMUSCULAR; INTRAVENOUS; SUBCUTANEOUS
Status: DISCONTINUED | OUTPATIENT
Start: 2018-08-07 | End: 2018-08-07 | Stop reason: HOSPADM

## 2018-08-07 RX ORDER — PROPOFOL 10 MG/ML
INJECTION, EMULSION INTRAVENOUS AS NEEDED
Status: DISCONTINUED | OUTPATIENT
Start: 2018-08-07 | End: 2018-08-07 | Stop reason: HOSPADM

## 2018-08-07 RX ORDER — NALOXONE HYDROCHLORIDE 0.4 MG/ML
.2-.4 INJECTION, SOLUTION INTRAMUSCULAR; INTRAVENOUS; SUBCUTANEOUS
Status: DISCONTINUED | OUTPATIENT
Start: 2018-08-07 | End: 2018-08-10 | Stop reason: HOSPADM

## 2018-08-07 RX ORDER — ONDANSETRON 8 MG/1
8 TABLET, ORALLY DISINTEGRATING ORAL
Status: DISCONTINUED | OUTPATIENT
Start: 2018-08-07 | End: 2018-08-10 | Stop reason: HOSPADM

## 2018-08-07 RX ORDER — ACETAMINOPHEN 10 MG/ML
1000 INJECTION, SOLUTION INTRAVENOUS ONCE
Status: COMPLETED | OUTPATIENT
Start: 2018-08-07 | End: 2018-08-09

## 2018-08-07 RX ORDER — SODIUM CHLORIDE, SODIUM LACTATE, POTASSIUM CHLORIDE, CALCIUM CHLORIDE 600; 310; 30; 20 MG/100ML; MG/100ML; MG/100ML; MG/100ML
100 INJECTION, SOLUTION INTRAVENOUS CONTINUOUS
Status: DISCONTINUED | OUTPATIENT
Start: 2018-08-07 | End: 2018-08-07 | Stop reason: HOSPADM

## 2018-08-07 RX ORDER — DEXAMETHASONE SODIUM PHOSPHATE 100 MG/10ML
10 INJECTION INTRAMUSCULAR; INTRAVENOUS ONCE
Status: ACTIVE | OUTPATIENT
Start: 2018-08-08 | End: 2018-08-09

## 2018-08-07 RX ORDER — SODIUM CHLORIDE 0.9 % (FLUSH) 0.9 %
5-10 SYRINGE (ML) INJECTION EVERY 8 HOURS
Status: DISCONTINUED | OUTPATIENT
Start: 2018-08-07 | End: 2018-08-07 | Stop reason: HOSPADM

## 2018-08-07 RX ORDER — MIDAZOLAM HYDROCHLORIDE 1 MG/ML
2 INJECTION, SOLUTION INTRAMUSCULAR; INTRAVENOUS ONCE
Status: DISCONTINUED | OUTPATIENT
Start: 2018-08-07 | End: 2018-08-07 | Stop reason: HOSPADM

## 2018-08-07 RX ORDER — ASPIRIN 81 MG/1
81 TABLET ORAL EVERY 12 HOURS
Status: DISCONTINUED | OUTPATIENT
Start: 2018-08-07 | End: 2018-08-10 | Stop reason: HOSPADM

## 2018-08-07 RX ORDER — PROMETHAZINE HYDROCHLORIDE 25 MG/ML
6.25 INJECTION, SOLUTION INTRAMUSCULAR; INTRAVENOUS
Status: DISCONTINUED | OUTPATIENT
Start: 2018-08-07 | End: 2018-08-09

## 2018-08-07 RX ORDER — HYDROMORPHONE HYDROCHLORIDE 2 MG/1
2 TABLET ORAL
Status: DISCONTINUED | OUTPATIENT
Start: 2018-08-07 | End: 2018-08-10 | Stop reason: HOSPADM

## 2018-08-07 RX ORDER — SODIUM CHLORIDE 0.9 % (FLUSH) 0.9 %
5-10 SYRINGE (ML) INJECTION EVERY 8 HOURS
Status: CANCELLED | OUTPATIENT
Start: 2018-08-07

## 2018-08-07 RX ORDER — TRANEXAMIC ACID 100 MG/ML
INJECTION, SOLUTION INTRAVENOUS AS NEEDED
Status: DISCONTINUED | OUTPATIENT
Start: 2018-08-07 | End: 2018-08-07 | Stop reason: HOSPADM

## 2018-08-07 RX ORDER — CELECOXIB 200 MG/1
200 CAPSULE ORAL EVERY 12 HOURS
Status: DISCONTINUED | OUTPATIENT
Start: 2018-08-07 | End: 2018-08-10 | Stop reason: HOSPADM

## 2018-08-07 RX ORDER — SODIUM CHLORIDE 0.9 % (FLUSH) 0.9 %
5-10 SYRINGE (ML) INJECTION AS NEEDED
Status: DISCONTINUED | OUTPATIENT
Start: 2018-08-07 | End: 2018-08-07 | Stop reason: HOSPADM

## 2018-08-07 RX ORDER — LIDOCAINE HYDROCHLORIDE 10 MG/ML
0.1 INJECTION INFILTRATION; PERINEURAL AS NEEDED
Status: DISCONTINUED | OUTPATIENT
Start: 2018-08-07 | End: 2018-08-07 | Stop reason: HOSPADM

## 2018-08-07 RX ORDER — SODIUM CHLORIDE 0.9 % (FLUSH) 0.9 %
5-10 SYRINGE (ML) INJECTION AS NEEDED
Status: DISCONTINUED | OUTPATIENT
Start: 2018-08-07 | End: 2018-08-10 | Stop reason: HOSPADM

## 2018-08-07 RX ORDER — SODIUM CHLORIDE, SODIUM LACTATE, POTASSIUM CHLORIDE, CALCIUM CHLORIDE 600; 310; 30; 20 MG/100ML; MG/100ML; MG/100ML; MG/100ML
75 INJECTION, SOLUTION INTRAVENOUS CONTINUOUS
Status: DISCONTINUED | OUTPATIENT
Start: 2018-08-07 | End: 2018-08-07 | Stop reason: HOSPADM

## 2018-08-07 RX ORDER — DIPHENHYDRAMINE HCL 25 MG
25 CAPSULE ORAL
Status: DISCONTINUED | OUTPATIENT
Start: 2018-08-07 | End: 2018-08-10 | Stop reason: HOSPADM

## 2018-08-07 RX ORDER — ROPIVACAINE HYDROCHLORIDE 2 MG/ML
INJECTION, SOLUTION EPIDURAL; INFILTRATION; PERINEURAL AS NEEDED
Status: DISCONTINUED | OUTPATIENT
Start: 2018-08-07 | End: 2018-08-07 | Stop reason: HOSPADM

## 2018-08-07 RX ADMIN — ACETAMINOPHEN 1000 MG: 500 TABLET, FILM COATED ORAL at 07:51

## 2018-08-07 RX ADMIN — TUBERCULIN PURIFIED PROTEIN DERIVATIVE 5 UNITS: 5 INJECTION, SOLUTION INTRADERMAL at 07:51

## 2018-08-07 RX ADMIN — TRANEXAMIC ACID 1000 MG: 100 INJECTION, SOLUTION INTRAVENOUS at 09:55

## 2018-08-07 RX ADMIN — EPHEDRINE SULFATE 10 MG: 50 INJECTION, SOLUTION INTRAVENOUS at 10:31

## 2018-08-07 RX ADMIN — HYDROMORPHONE HYDROCHLORIDE 2 MG: 2 TABLET ORAL at 21:25

## 2018-08-07 RX ADMIN — FENTANYL CITRATE 50 MCG: 50 INJECTION, SOLUTION INTRAMUSCULAR; INTRAVENOUS at 09:53

## 2018-08-07 RX ADMIN — EPHEDRINE SULFATE 10 MG: 50 INJECTION, SOLUTION INTRAVENOUS at 10:10

## 2018-08-07 RX ADMIN — PROMETHAZINE HYDROCHLORIDE 6.25 MG: 25 INJECTION INTRAMUSCULAR; INTRAVENOUS at 17:42

## 2018-08-07 RX ADMIN — ACETAMINOPHEN 1000 MG: 10 INJECTION, SOLUTION INTRAVENOUS at 18:00

## 2018-08-07 RX ADMIN — EPHEDRINE SULFATE 10 MG: 50 INJECTION, SOLUTION INTRAVENOUS at 10:49

## 2018-08-07 RX ADMIN — OXYCODONE HYDROCHLORIDE 10 MG: 5 TABLET ORAL at 14:42

## 2018-08-07 RX ADMIN — PROPOFOL 50 MCG/KG/MIN: 10 INJECTION, EMULSION INTRAVENOUS at 10:06

## 2018-08-07 RX ADMIN — CARVEDILOL 6.25 MG: 6.25 TABLET, FILM COATED ORAL at 21:25

## 2018-08-07 RX ADMIN — SODIUM CHLORIDE, SODIUM LACTATE, POTASSIUM CHLORIDE, AND CALCIUM CHLORIDE 100 ML/HR: 600; 310; 30; 20 INJECTION, SOLUTION INTRAVENOUS at 07:50

## 2018-08-07 RX ADMIN — EPHEDRINE SULFATE 5 MG: 50 INJECTION, SOLUTION INTRAVENOUS at 10:19

## 2018-08-07 RX ADMIN — ONDANSETRON 8 MG: 8 TABLET, ORALLY DISINTEGRATING ORAL at 16:54

## 2018-08-07 RX ADMIN — ONDANSETRON 4 MG: 2 INJECTION INTRAMUSCULAR; INTRAVENOUS at 10:02

## 2018-08-07 RX ADMIN — Medication 2 G: at 18:00

## 2018-08-07 RX ADMIN — EPHEDRINE SULFATE 5 MG: 50 INJECTION, SOLUTION INTRAVENOUS at 10:29

## 2018-08-07 RX ADMIN — ASPIRIN 81 MG: 81 TABLET, DELAYED RELEASE ORAL at 21:25

## 2018-08-07 RX ADMIN — SODIUM CHLORIDE, SODIUM LACTATE, POTASSIUM CHLORIDE, AND CALCIUM CHLORIDE: 600; 310; 30; 20 INJECTION, SOLUTION INTRAVENOUS at 10:04

## 2018-08-07 RX ADMIN — DEXAMETHASONE SODIUM PHOSPHATE 10 MG: 4 INJECTION, SOLUTION INTRA-ARTICULAR; INTRALESIONAL; INTRAMUSCULAR; INTRAVENOUS; SOFT TISSUE at 10:02

## 2018-08-07 RX ADMIN — CELECOXIB 200 MG: 200 CAPSULE ORAL at 21:25

## 2018-08-07 RX ADMIN — CEFAZOLIN SODIUM 2 G: 1 INJECTION, POWDER, FOR SOLUTION INTRAMUSCULAR; INTRAVENOUS at 10:03

## 2018-08-07 RX ADMIN — PROPOFOL 20 MG: 10 INJECTION, EMULSION INTRAVENOUS at 10:05

## 2018-08-07 RX ADMIN — EPHEDRINE SULFATE 10 MG: 50 INJECTION, SOLUTION INTRAVENOUS at 10:18

## 2018-08-07 NOTE — PROGRESS NOTES
Admission assessment complete. Pt resting in bed. Call light within reach. Pt oriented to room and menu. Pt is able dorsi/plantar flex bilaterally with +2 pedal pulses. Pt has no complaints of pain at this time. IS at bedside. Pt verbally understands to call for pain medication.

## 2018-08-07 NOTE — PERIOP NOTES
Teach back method used in review of Incentive Spirometer TOTAL TIDAL VOLUME 1250 ML OBSERVED, Hibiclens usage preop, TB screening and pain management goals.

## 2018-08-07 NOTE — PERIOP NOTES
BETADINE LAVAGE  500 mL 0.9% NaCl LOT -[id number] EXP 02/01/2021 mixed with Betadine paint solution LOT [id number] EXP 01/2020

## 2018-08-07 NOTE — PERIOP NOTES
TRANSFER - OUT REPORT:    Verbal report given to [facility] RN(name) on Tye Sesay  being transferred to PRE-OP(unit) for routine progression of care       Report consisted of patients Situation, Background, Assessment and   Recommendations(SBAR). Information from the following report(s) SBAR, MAR and Recent Results was reviewed with the receiving nurse. Lines:   Peripheral IV 08/07/18 Right Hand (Active)   Site Assessment Clean, dry, & intact 8/7/2018  7:35 AM   Phlebitis Assessment 0 8/7/2018  7:35 AM   Infiltration Assessment 0 8/7/2018  7:35 AM   Dressing Status Clean, dry, & intact 8/7/2018  7:35 AM   Dressing Type Tape;Transparent 8/7/2018  7:35 AM   Hub Color/Line Status Green; Infusing 8/7/2018  7:35 AM   Action Taken Blood drawn 8/7/2018  7:35 AM        Opportunity for questions and clarification was provided.       Patient transported with:   "[employer]"

## 2018-08-07 NOTE — PERIOP NOTES
TRANSFER - IN REPORT:    Verbal report received from Ana Gross (name) on Roby Goldberg  being received from [facility] (unit) for routine progression of care      Report consisted of patients Situation, Background, Assessment and   Recommendations(SBAR). Information from the following report(s) SBAR, Procedure Summary, Intake/Output, MAR and Recent Results was reviewed with the receiving nurse. Opportunity for questions and clarification was provided. Assessment completed upon patients arrival to unit and care assumed.

## 2018-08-07 NOTE — ANESTHESIA POSTPROCEDURE EVALUATION
Post-Anesthesia Evaluation and Assessment    Patient: Lucia Mejia MRN: [id number]  SSN: [id number]    YOB: 1928  Age: 80 y.o. Sex: female       Cardiovascular Function/Vital Signs  Visit Vitals    /70    Pulse (!) 53    Temp 36.4 °C (97.6 °F)    Resp 16    Ht 5' 6\" (1.676 m)    Wt 81.6 kg (180 lb)    SpO2 99%    BMI 29.05 kg/m2       Patient is status post spinal anesthesia for Procedure(s):  LEFT HIP ARTHROPLASTY TOTAL. Nausea/Vomiting: None    Postoperative hydration reviewed and adequate. Pain:  Pain Scale 1: Numeric (0 - 10) (08/07/18 1220)  Pain Intensity 1: 0 (08/07/18 1220)   Managed    Neurological Status:   Neuro (WDL): Exceptions to WDL (08/07/18 1210)  Neuro  Neurologic State: Drowsy (08/07/18 1210)  Orientation Level: Oriented X4 (08/07/18 1210)  LUE Motor Response: Purposeful (08/07/18 1210)  LLE Motor Response: Purposeful (08/07/18 1210)  RUE Motor Response: Purposeful (08/07/18 1210)  RLE Motor Response: Purposeful (08/07/18 1210)   At baseline    Mental Status and Level of Consciousness: Arousable    Pulmonary Status:   Room air  Adequate oxygenation and airway patent    Complications related to anesthesia: None    Post-anesthesia assessment completed. No concerns. Pt doing well.     Signed By: Balaji Anglin MD     August 7, 2018

## 2018-08-07 NOTE — PROGRESS NOTES
08/07/18 1313   Oxygen Therapy   O2 Sat (%) 96 %   Pulse via Oximetry 58 beats per minute   O2 Device Room air   O2 Flow Rate (L/min) 0 l/min   FIO2 (%) 21 %       Patient achieved 1650 Ml/sec on IS. Patient encouraged to do every hour while awake-patient agreed and demonstrated. No shortness of breath or distress noted. BS are clear b/l. [facility] notes reviewed.

## 2018-08-07 NOTE — PROGRESS NOTES
TRANSFER - IN REPORT:    Verbal report received from Nadeen Hill on Essie Alpers  being received from PACU for routine progression of care      Report consisted of patients Situation, Background, Assessment and   Recommendations(SBAR). Information from the following report(s) SBAR was reviewed with the receiving nurse. Opportunity for questions and clarification was provided. Assessment completed upon patients arrival to unit and care assumed.

## 2018-08-07 NOTE — PROGRESS NOTES
Problem: Mobility Impaired (Adult and Pediatric)  Goal: *Acute Goals and Plan of Care (Insert Text)  GOALS (1-4 days):  (1.)Ms. Jeri Whiteside will move from supine to sit and sit to supine  in bed with INDEPENDENT. (2.)Ms. Jeri Whiteside will transfer from bed to chair and chair to bed with INDEPENDENT using the least restrictive device. (3.)Ms. Jeri Whiteside will ambulate with INDEPENDENT for 250 feet with the least restrictive device. (4.)Ms. Jeri Whiteside will ambulate up/down 3 steps with bilateral  railing with MINIMAL ASSIST with no device. (5.)Ms. Jeri Whiteside will state/observe MALINDA precautions with 0 verbal cues. ________________________________________________________________________________________________    Outcome: Progressing Towards Goal    PHYSICAL THERAPY [facility] [place]: Initial Assessment 8/7/2018  INPATIENT: Hospital Day: 1  [place] / [place] / Product Type: Medicare /      NAME/AGE/GENDER: Reina Jimenez is a 80 y.o. female   PRIMARY DIAGNOSIS:  UNILATERAL PRIMARY OSTEOARTHRITIS, LEFT HIP   Procedure(s) and Anesthesia Type:     * LEFT HIP ARTHROPLASTY TOTAL - Spinal (Left)  ICD-10: Treatment Diagnosis:    · Pain in left hip (M25.552)  · Stiffness of Left Hip, Not elsewhere classified (M25.652)  · Difficulty in walking, Not elsewhere classified (R26.2)  · Other abnormalities of gait and mobility (R26.89)      ASSESSMENT:     Ms. Jeri Whiteside presents status post left total hip replacement with decreased independence with functional mobility. Therapy will maximize independence with functional mobility. Pt performed supine to sit to stand and ambulated to bedside chair, as below. Pt progressing with ambulation. This section established at most recent assessment   PROBLEM LIST (Impairments causing functional limitations):  1. Decreased Strength  2. Decreased Transfer Abilities  3. Decreased Ambulation Ability/Technique  4. Decreased Balance  5. Increased Pain  6.  Decreased Flexibility/Joint Mobility   INTERVENTIONS PLANNED: (Benefits and precautions of physical therapy have been discussed with the patient.)  1. Bed Mobility  2. Gait Training  3. Home Exercise Program (HEP)  4. Therapeutic Exercise/Strengthening  5. Transfer Training  6. Range of Motion: active/assisted/passive  7. Therapeutic Activities  8. Group Therapy     TREATMENT PLAN: Frequency/Duration: Follow patient BID for duration of hospital stay to address above goals. Rehabilitation Potential For Stated Goals: Good     RECOMMENDED REHABILITATION/EQUIPMENT: (at time of discharge pending progress): Home Health: Physical Therapy. HISTORY:   History of Present Injury/Illness (Reason for Referral):  Pt is status post left total hip replacement. Past Medical History/Comorbidities:   Ms. Dean Sidhu  has a past medical history of Anemia; HTN (hypertension), benign; Hypercholesteremia; and Migraine. Ms. Dean Sidhu  has a past surgical history that includes hx hysterectomy; hx refractive surgery; hx back surgery; hx orthopaedic; hx tonsillectomy; endoscopy, colon, diagnostic (10/3/12); and hx appendectomy. Social History/Living Environment:      Prior Level of Function/Work/Activity:  Independent with functional mobility. Number of Personal Factors/Comorbidities that affect the Plan of Care: 0: LOW COMPLEXITY   EXAMINATION:   Most Recent Physical Functioning:   Gross Assessment: Yes  Gross Assessment  AROM: Generally decreased, functional  Strength: Generally decreased, functional  Coordination: Generally decreased, functional          LLE AROM  L Hip Flexion: 90 (hip precautions)          Bed Mobility  Supine to Sit: Contact guard assistance    Transfers  Sit to Stand: Contact guard assistance  Stand to Sit: Contact guard assistance  Bed to Chair: Contact guard assistance    Balance  Sitting: Intact  Standing: Pull to stand; With support    Posture  Posture (WDL): Within defined limits         Weight Bearing Status  Left Side Weight Bearing: As tolerated  Distance (ft): 25 Feet (ft)  Assistive Device: Walker, rolling  Gait Abnormalities: Antalgic        Braces/Orthotics: none    Left Hip Cold  Type: Cold/ice pack      Body Structures Involved:  1. Bones  2. Joints  3. Muscles  4. Ligaments Body Functions Affected:  1. Neuromusculoskeletal  2. Movement Related Activities and Participation Affected:  1. Mobility   Number of elements that affect the Plan of Care: 4+: HIGH COMPLEXITY   CLINICAL PRESENTATION:   Presentation: Stable and uncomplicated: LOW COMPLEXITY   CLINICAL DECISION MAKING:   [facility] AM-PAC 6 Clicks   Basic Mobility Inpatient Short Form  How much difficulty does the patient currently have. .. Unable A Lot A Little None   1. Turning over in bed (including adjusting bedclothes, sheets and blankets)? [] 1   [] 2   [x] 3   [] 4   2. Sitting down on and standing up from a chair with arms ( e.g., wheelchair, bedside commode, etc.)   [] 1   [] 2   [x] 3   [] 4   3. Moving from lying on back to sitting on the side of the bed? [] 1   [] 2   [x] 3   [] 4   How much help from another person does the patient currently need. .. Total A Lot A Little None   4. Moving to and from a bed to a chair (including a wheelchair)? [] 1   [] 2   [x] 3   [] 4   5. Need to walk in hospital room? [] 1   [] 2   [x] 3   [] 4   6. Climbing 3-5 steps with a railing? [] 1   [] 2   [x] 3   [] 4   © 2007, Trustees of [facility], under license to [employer]. All rights reserved        Score:  Initial: 18 Most Recent: X (Date: -- )    Interpretation of Tool:  Represents activities that are increasingly more difficult (i.e. Bed mobility, Transfers, Gait). Score 24 23 22-20 19-15 14-10 9-7 6     Modifier CH CI CJ CK CL CM CN      ?  Mobility - Walking and Moving Around:     - CURRENT STATUS: CK - 40%-59% impaired, limited or restricted    - GOAL STATUS: CK - 40%-59% impaired, limited or restricted    - D/C STATUS:  CK - 40%-59% impaired, limited or restricted  [place] / [place] / Product Type: Medicare /      Medical Necessity:     · Skilled intervention continues to be required due to decreased mobility ability. Reason for Services/Other Comments:  · Patient continues to require skilled intervention due to decreased mobility ability. Use of outcome tool(s) and clinical judgement create a POC that gives a: Clear prediction of patient's progress: LOW COMPLEXITY            TREATMENT:   (In addition to Assessment/Re-Assessment sessions the following treatments were rendered)     Pre-treatment Symptoms/Complaints:  3/10 left knee pain  Pain: Initial:   Pain Intensity 1: 3  Pain Location 1: Knee  Pain Orientation 1: Left  Post Session:  3/10 left knee pain     Therapeutic Exercise: (  15 minutes):  Exercises per grid below to improve mobility. Assessment/Reassessment only, no treatment provided today      Date:  8/7 Date:   Date:     ACTIVITY/EXERCISE AM PM AM PM AM PM   GROUP THERAPY  []  []  []  []  []  []   Ankle Pumps 5        Quad Sets 10        Gluteal Sets 10        Hip ABd/ADduction 5        Straight Leg Raises         Knee Slides 10        Short Arc Quads         Long Arc Quads         Chair Slides                  B = bilateral; AA = active assistive; A = active; P = passive      Treatment/Session Assessment:     Response to Treatment:  Pt agreeable to therapy.     Education:  [x] Home Exercises  [x] Fall Precautions  [x] Hip Precautions [] D/C Instruction Review  [] Knee/Hip Prosthesis Review  [x] Walker Management/Safety [] Adaptive Equipment as Needed       Interdisciplinary Collaboration:   o Physical Therapist  o Occupational Therapist  o Registered Nurse    After treatment position/precautions:   o Up in chair  o Bed/Chair-wheels locked  o Bed in low position  o Caregiver at bedside  o Call light within reach  o RN notified    Compliance with Program/Exercises: compliant most of the time. Recommendations/Intent for next treatment session:  Treatment next visit will focus on increasing Ms. Dye's independence with bed mobility, transfers, gait training, strength/ROM exercises, modalities for pain, and patient education.       Total Treatment Duration:  PT Patient Time In/Time Out  Time In: 1430  Time Out: 1448    Anand Risk, PT

## 2018-08-07 NOTE — PERIOP NOTES
TRANSFER - OUT REPORT:    Verbal report given to [facility] RN(name) on Johanne Tejada  being transferred to [facility](unit) for routine post - op       Report consisted of patients Situation, Background, Assessment and   Recommendations(SBAR). Information from the following report(s) SBAR, Kardex, OR Summary, Procedure Summary, Intake/Output and MAR was reviewed with the receiving nurse. Opportunity for questions and clarification was provided.       Patient transported with:   O2 @ 3 liters  [employer]

## 2018-08-07 NOTE — PROGRESS NOTES
Problem: Self Care Deficits Care Plan (Adult)  Goal: *Acute Goals and Plan of Care (Insert Text)  GOALS:   DISCHARGE GOALS (in preparation for going home/rehab):  3 days  1. Ms. Diana Joseph will perform one lower body dressing activity with minimal assistance with adaptive equipment to demonstrate improved functional mobility and safety. 2.  Ms. Diana Joseph will perform one lower body bathing activity with minimal  assistance with adaptive equipment to demonstrate improved functional mobility and safety. 3.  Ms. Diana Joseph will perform toileting/toilet transfer with contact guard assistance with adaptive equipment to demonstrate improved functional mobility and safety. 4.  Ms. Diana Joseph will perform shower transfer with contact guard assistance with adaptive equipment to demonstrate improved functional mobility and safety. 5.  Ms. Diana Joseph will state JANE precautions with two verbal cues to demonstrate improved functional mobility and safety. JOINT CAMP OCCUPATIONAL THERAPY JANE: Initial Assessment 8/7/2018  INPATIENT: Hospital Day: 1  [place] / [place] / Product Type: Medicare /      NAME/AGE/GENDER: Roby Goldberg is a 80 y.o. female   PRIMARY DIAGNOSIS:  UNILATERAL PRIMARY OSTEOARTHRITIS, LEFT HIP   Procedure(s) and Anesthesia Type:     * LEFT HIP ARTHROPLASTY TOTAL - Spinal (Left)  ICD-10: Treatment Diagnosis:    · Pain in left hip (M25.552)  · Stiffness of Left Hip, Not elsewhere classified (P63.107)      ASSESSMENT:     Ms. Diana Joseph is s/p Left JANE and presents with decreased weight bearing on L LE and decreased independence with functional mobility and activities of daily living as compared to prior level of function and safety. Patient would benefit from skilled Occupational Therapy to maximize independence and safety with self-care task and functional mobility.   Pt would also benefit from education on lower body adaptive equipment and hip precautions post-surgery in preparation for going home. Able to mobilize with RW with therapies from bed to recliner. Plan is to go home with dtr and friends. This section established at most recent assessment   PROBLEM LIST (Impairments causing functional limitations):  1. Decreased Strength  2. Decreased ADL/Functional Activities  3. Decreased Transfer Abilities  4. Increased Pain  5. Increased Fatigue  6. Decreased Flexibility/Joint Mobility  7. Decreased Knowledge of Precautions   INTERVENTIONS PLANNED: (Benefits and precautions of occupational therapy have been discussed with the patient.)  1. Activities of daily living training  2. Adaptive equipment training  3. Balance training  4. Clothing management  5. Donning&doffing training  6. Theraputic activity     TREATMENT PLAN: Frequency/Duration: Follow patient 1-2tx to address above goals. Rehabilitation Potential For Stated Goals: Excellent     RECOMMENDED REHABILITATION/EQUIPMENT: (at time of discharge pending progress): Home Health: Physical Therapy. OCCUPATIONAL PROFILE AND HISTORY:   History of Present Injury/Illness (Reason for Referral): Pt presents this date s/p (Left) JANE. Past Medical History/Comorbidities:   Ms. Samuel Ty  has a past medical history of Anemia; HTN (hypertension), benign; Hypercholesteremia; and Migraine. Ms. Samuel Ty  has a past surgical history that includes hx hysterectomy; hx refractive surgery; hx back surgery; hx orthopaedic; hx tonsillectomy; endoscopy, colon, diagnostic (10/3/12); and hx appendectomy. Social History/Living Environment:      Prior Level of Function/Work/Activity:  Independent prior.    Number of Personal Factors/Comorbidities that affect the Plan of Care: Brief history (0):  LOW COMPLEXITY   ASSESSMENT OF OCCUPATIONAL PERFORMANCE[de-identified]   Most Recent Physical Functioning:   Balance  Sitting: Intact  Standing: With support                  LLE AROM  L Hip Flexion: 90 (hip precautions) Coordination  Fine Motor Skills-Upper: Left Intact; Right Intact  Gross Motor Skills-Upper: Left Intact; Right Intact         Mental Status  Neurologic State: Alert  Orientation Level: Oriented X4  Cognition: Appropriate decision making  Perception: Appears intact  Perseveration: No perseveration noted  Safety/Judgement: Awareness of environment          RLE Assessment  RLE Assessment (WDL): Within defined limits     Basic ADLs (From Assessment) Complex ADLs (From Assessment)   Basic ADL  Feeding: Independent  Oral Facial Hygiene/Grooming: Setup  Bathing: Minimum assistance  Upper Body Dressing: Setup  Lower Body Dressing: Total assistance  Toileting: Moderate assistance     Grooming/Bathing/Dressing Activities of Daily Living     Cognitive Retraining  Safety/Judgement: Awareness of environment                 Functional Transfers  Toilet Transfer : Moderate assistance  Shower Transfer: Moderate assistance     Bed/Mat Mobility  Supine to Sit: Contact guard assistance  Sit to Stand: Contact guard assistance  Bed to Chair: Contact guard assistance         Physical Skills Involved:  1. Range of Motion  2. Balance  3. Strength Cognitive Skills Affected (resulting in the inability to perform in a timely and safe manner):  1. NT Psychosocial Skills Affected:  1. nt   Number of elements that affect the Plan of Care: 1-3:  LOW COMPLEXITY   CLINICAL DECISION MAKIN [place] [address] AM-PAC 6 Clicks   Daily Activity Inpatient Short Form  How much help from another person does the patient currently need. .. Total A Lot A Little None   1. Putting on and taking off regular lower body clothing? [x] 1   [] 2   [] 3   [] 4   2. Bathing (including washing, rinsing, drying)? [] 1   [x] 2   [] 3   [] 4   3. Toileting, which includes using toilet, bedpan or urinal?   [] 1   [x] 2   [] 3   [] 4   4. Putting on and taking off regular upper body clothing? [] 1   [] 2   [] 3   [x] 4   5. Taking care of personal grooming such as brushing teeth?    [] 1 [] 2   [] 3   [x] 4   6. Eating meals? [] 1   [] 2   [] 3   [x] 4   © 2007, Trustees of [address] [place] [address] [address] [address], under license to [employer]. All rights reserved     Score:  Initial: 17 Most Recent: X (Date: -- )    Interpretation of Tool:  Represents activities that are increasingly more difficult (i.e. Bed mobility, Transfers, Gait). Score 24 23 22-20 19-15 14-10 9-7 6     Modifier CH CI CJ CK CL CM CN      ? Self Care:     - CURRENT STATUS: CK - 40%-59% impaired, limited or restricted    - GOAL STATUS: CJ - 20%-39% impaired, limited or restricted    - D/C STATUS:  ---------------To be determined---------------  [place] / [place] / Product Type: Medicare /      Medical Necessity:     · Skilled intervention continues to be required due to Deficits noted above. Reason for Services/Other Comments:  · Patient continues to require skilled intervention due to new JANE. Use of outcome tool(s) and clinical judgement create a POC that gives a: MODERATE COMPLEXITY            TREATMENT:   (In addition to Assessment/Re-Assessment sessions the following treatments were rendered)     Pre-treatment Symptoms/Complaints:    Pain: Initial:   Pain Intensity 1: 3  Post Session:  3     Assessment/Reassessment only, no treatment provided today    Treatment/Session Assessment:     Response to Treatment:  Good, sitting up in recliner. .    Education:  [] Home Exercises  [x] Fall Precautions  [x] Hip Precautions [] Going Home Video  [] Knee/Hip Prosthesis Review  [x] Walker Management/Safety [x] Adaptive Equipment as Needed       Interdisciplinary Collaboration:   o Physical Therapist  o Occupational Therapist  o Registered Nurse    After treatment position/precautions:   o Up in chair  o Bed/Chair-wheels locked  o Caregiver at bedside  o Call light within reach  o RN notified     Compliance with Program/Exercises: compliant all of the time.     Recommendations/Intent for next treatment session:  Treatment next visit will focus on increasing Ms. Dye's independence with bed mobility, transfers, self care, functional mobility, modalities for pain, and patient education.       Total Treatment Duration:  OT Patient Time In/Time Out  Time In: 1420  Time Out: [address] [place], [place]

## 2018-08-07 NOTE — H&P
[id number] [facility]  History and Physical Exam    Patient ID:  Alex Addison  [id number]    18 y.o.  11/25/1928    Today: August 7, 2018    Vitals Signs: Reviewed as noted in medical record. Allergies: Allergies   Allergen Reactions    Codeine Other (comments)     GI upset    Penicillins Rash       CC: Left hip pain    HPI:  Pt complains of left hip pain and with difficulty ambulating. Relevant PMH:   Past Medical History:   Diagnosis Date    Anemia     EGD 10-3-12:  schatzki's ring, duodenal ulcer, gastritis. Colonoscopy 10-3-12: diverticulosis, colon polyp. repeat colonoscopy 10 years    HTN (hypertension), benign     Hypercholesteremia     Migraine        Objective:                    HEENT: NC/AT                   Lungs:  clear                   Heart:   rrr                   Abdomen: soft                   Extremities:  Pain with rom of the lower extremity    Radiographs: reveal osteoarthritis with loss of joint space and bone spurs. Assessment: Unilateral primary osteoarthritis, left hip [M16.12]    Plan:  Proceed with scheduled Procedure(s) (LRB):  HIP ARTHROPLASTY TOTAL (Left) . The patient has failed conservative treatment including NSAIDS, and injections. Due to the amount of pain the patient is experiencing we will proceed with scheduled procedure.       Signed By: CEDRICK Viera  August 7, 2018

## 2018-08-07 NOTE — OP NOTES
Total Hip Procedure Note               Lino Zambrano, [id number], 11/25/1928    Pre-operative Diagnosis: UNILATERAL PRIMARY OSTEOARTHRITIS, LEFT HIP      Post-operative Diagnosis: UNILATERAL PRIMARY OSTEOARTHRITIS, LEFT HIP     Procedure: Left Total Hip Arthroplasty     BMI: Body mass index is 29.05 kg/(m^2). Gregoria Vinson Location: [address] [place] [address]      Surgeon: Shane Brown MD      Assistant: CEDRICK Mendez    Anesthesia: Spinal      EBL: 200 cc     Procedure: Total Hip Arthroplasty    The complexity of total joint surgery requires the use of a skilled first assistant for positioning, retraction and assistance in closure. Lino Zambrano was brought to the operating room and positioned on the operating table. Anesthesia was administered. A scott catheter was placed preoperatively and IV antibiotics were administered, along with IV transexamic acid. A time out identifying the patient, procedure, operative side and surgeon was administered and charted by the OR Nurse. Lino Zambrano was positioned on left lateral decubitus position. The limb was prepped and draped in the usual sterile fashion. The Posterior approach was utilized to expose the hip. The incision was carried through the subcutaneous tissue and underlying fascia with hemostasis obtained using the bovie cauterization. A Charmley retractor was inserted. The short external rotators were divided at their insertion. The sciatic nerve was palpated and identified. Next, a T-shaped capsular incision was accomplished and femoral head was dislocated. The articular surface revealed loss of cartilage, exposed bone and bone spurring. The neck was osteotomized in the appropriate position just above the lesser trochanteric region. The neck cut was measured to be 8 mm. Acetabular retractors were placed and the appropriate capsulotomy performed. Soft tissue was removed from the acetabulum.  The acetabular surface revealed loss of cartilage with exposed bone. The acetabulum was sequentially reamed. Using trial components, the acetabulum was sized to a 56 mm Alma acetabular cup. The acetabular component was impacted to achieve appropriate horizontal tilt, anteversion, coverage, and stability. 2 screws were used to stabilize the cup. The polyethelene liner was impacted into position. Utilizing the femoral retractor, the canal was prepared with appropriate lateralization with the starter rasp. The canal was then broached progressively. The broach was positioned with the appropriate anatomic femoral anteversion. A calcar planar was utilized. A trial reduction with a +1.5 neck length was utilized. This was found to be the most stable to flexion greater than 90 degrees and on internal and external rotation. Limb lengths were thought to be equilibrated and with appropriate stability as mentioned above. All trial components were removed. The cementless permanent size 8 high offset ACTIS  was impacted into place. A trial reduction was performed and the above neck length was selected. The permanent femoral head was impacted into place. Jacquelin Dye's hip was reduced and stability was as mentioned above. Copious irrigation was accomplished about the surgical site. The sciatic nerve was palpated and noted to be intact. The capsule was repaired with a #1 PDS and the external rotators were reattached with a #5 Mersilene. A lavage of diluted betadine solution of 17.5 ml Betadine in 500 of 0.9% Normal Saline was allowed to soak in the wound for 3 minutes after implanting of the prosthesis. The wound was irrigated with Saline again before closure. The joint and skin areas were sprinkled with 1 gm of vancomycin powder. Prior to the final skin closure, full strength betadine was applied to the skin surrounding the skin incision. The operative hip was injected prior to closure for post operative pain management.  A #1 PDS suture was used to re-approximate the fascia. 60 cc of transexamic acid was injected under the fascia for hemostasis. Monocryl sutures were used to approximate the subcutaneous layers. Staples were used to reapproximate the skin edges and a sterile bandage was placed. The patient was then rolled to a supine position. The sponge and needle counts were correct. The patient tolerated the procedure without difficulty and left the operating room in satisfactory condition. Implants:   Implant Name Type Inv.  Item Serial No.  Lot No. LRB No. Used   LINER ACET PINN NEUT [id number] -- ALTRX - [id number]  LINER ACET PINN NEUT [id number] -- ALTRX [id number] [facility] [id number] Left 1   CUP ACET MH PINN 56MM GRIPTION --  - [id number]  CUP ACET MH PINN 56MM GRIPTION --  [id number] [facility] [id number] Left 1   SCR ACET CANC PINN 6.5X25MM SS --  - [id number]  SCR ACET CANC PINN 6.5X25MM SS --  [id number] [facility] [id number] Left 1   SCR ACET CANC PINN 6.5X25MM SS --  - [id number]  SCR ACET CANC PINN 6.5X25MM SS --  [id number] [facility] [id number] Left 1   STEM FEM TAPR SZ8 HI OFFSET -- ACTIS - [id number]  STEM FEM TAPR SZ8 HI OFFSET -- [facility]IS [id number] [facility] [id number] [facility] 1   HEAD FEM 36MM +1.5MM NK -- BIOL[employer] DELTA - [id number]   HEAD FEM 36MM +1.5MM NK -- BIOLOX DELTA [id number] [facility] [id number] Left 1        By: Shane Brown MD

## 2018-08-07 NOTE — ANESTHESIA PROCEDURE NOTES
Spinal Block    Start time: 8/7/2018 9:55 AM  End time: 8/7/2018 9:58 AM  Performed by: Deni Murray  Authorized by: Deni Murray     Pre-procedure:   Indications: at surgeon's request and primary anesthetic  Preanesthetic Checklist: patient identified, risks and benefits discussed, anesthesia consent, site marked, patient being monitored and timeout performed    Timeout Time: 09:55          Spinal Block:   Patient Position:  Seated  Prep Region:  Lumbar  Prep: chlorhexidine and patient draped      Location:  L3-4  Technique:  Single shot  Local:  Lidocaine 1%  Local Dose (mL):  3    Needle:   Needle Type:  Pencan  Needle Gauge:  25 G  Attempts:  1      Events: CSF confirmed, no blood with aspiration and no paresthesia        Assessment:  Insertion:  Uncomplicated  Patient tolerance:  Patient tolerated the procedure well with no immediate complications

## 2018-08-08 LAB — HGB BLD-MCNC: 11.2 G/DL (ref 11.7–15.4)

## 2018-08-08 PROCEDURE — 97535 SELF CARE MNGMENT TRAINING: CPT

## 2018-08-08 PROCEDURE — 65270000029 HC RM PRIVATE

## 2018-08-08 PROCEDURE — 97150 GROUP THERAPEUTIC PROCEDURES: CPT

## 2018-08-08 PROCEDURE — 36415 COLL VENOUS BLD VENIPUNCTURE: CPT

## 2018-08-08 PROCEDURE — 85018 HEMOGLOBIN: CPT

## 2018-08-08 PROCEDURE — 74011250637 HC RX REV CODE- 250/637: Performed by: PHYSICIAN ASSISTANT

## 2018-08-08 PROCEDURE — 97110 THERAPEUTIC EXERCISES: CPT

## 2018-08-08 PROCEDURE — 77030020263 HC SOL INJ SOD CL0.9% LFCR 1000ML

## 2018-08-08 PROCEDURE — 74011250636 HC RX REV CODE- 250/636: Performed by: ORTHOPAEDIC SURGERY

## 2018-08-08 PROCEDURE — 97116 GAIT TRAINING THERAPY: CPT

## 2018-08-08 PROCEDURE — 99239 HOSP IP/OBS DSCHRG MGMT >30: CPT | Performed by: PHYSICAL MEDICINE & REHABILITATION

## 2018-08-08 PROCEDURE — 97161 PT EVAL LOW COMPLEX 20 MIN: CPT

## 2018-08-08 PROCEDURE — 74011250637 HC RX REV CODE- 250/637: Performed by: ORTHOPAEDIC SURGERY

## 2018-08-08 PROCEDURE — 74011250636 HC RX REV CODE- 250/636: Performed by: PHYSICIAN ASSISTANT

## 2018-08-08 PROCEDURE — 77030032490 HC SLV COMPR SCD KNE COVD -B

## 2018-08-08 RX ORDER — HYDROMORPHONE HYDROCHLORIDE 2 MG/1
2 TABLET ORAL
Qty: 50 TAB | Refills: 0 | Status: SHIPPED | OUTPATIENT
Start: 2018-08-08 | End: 2018-10-16

## 2018-08-08 RX ORDER — ASPIRIN 81 MG/1
81 TABLET ORAL EVERY 12 HOURS
Qty: 120 TAB | Refills: 0 | Status: SHIPPED | OUTPATIENT
Start: 2018-08-08 | End: 2019-10-08

## 2018-08-08 RX ADMIN — CELECOXIB 200 MG: 200 CAPSULE ORAL at 07:39

## 2018-08-08 RX ADMIN — Medication 2 G: at 01:03

## 2018-08-08 RX ADMIN — CARVEDILOL 6.25 MG: 6.25 TABLET, FILM COATED ORAL at 07:39

## 2018-08-08 RX ADMIN — SODIUM CHLORIDE 100 ML/HR: 900 INJECTION, SOLUTION INTRAVENOUS at 01:00

## 2018-08-08 RX ADMIN — SENNOSIDES AND DOCUSATE SODIUM 2 TABLET: 8.6; 5 TABLET ORAL at 07:40

## 2018-08-08 RX ADMIN — ASPIRIN 81 MG: 81 TABLET, DELAYED RELEASE ORAL at 07:40

## 2018-08-08 RX ADMIN — HYDROMORPHONE HYDROCHLORIDE 2 MG: 2 TABLET ORAL at 17:19

## 2018-08-08 RX ADMIN — CARVEDILOL 6.25 MG: 6.25 TABLET, FILM COATED ORAL at 17:19

## 2018-08-08 RX ADMIN — ACETAMINOPHEN 1000 MG: 500 TABLET, FILM COATED ORAL at 17:19

## 2018-08-08 RX ADMIN — ACETAMINOPHEN 1000 MG: 500 TABLET, FILM COATED ORAL at 06:58

## 2018-08-08 RX ADMIN — CELECOXIB 200 MG: 200 CAPSULE ORAL at 21:21

## 2018-08-08 RX ADMIN — PROMETHAZINE HYDROCHLORIDE 6.25 MG: 25 INJECTION INTRAMUSCULAR; INTRAVENOUS at 16:00

## 2018-08-08 RX ADMIN — ACETAMINOPHEN 1000 MG: 500 TABLET, FILM COATED ORAL at 00:59

## 2018-08-08 RX ADMIN — HYDROMORPHONE HYDROCHLORIDE 2 MG: 2 TABLET ORAL at 11:20

## 2018-08-08 RX ADMIN — ASPIRIN 81 MG: 81 TABLET, DELAYED RELEASE ORAL at 21:21

## 2018-08-08 RX ADMIN — ACETAMINOPHEN 1000 MG: 500 TABLET, FILM COATED ORAL at 11:20

## 2018-08-08 NOTE — PROGRESS NOTES
Problem: Self Care Deficits Care Plan (Adult)  Goal: *Acute Goals and Plan of Care (Insert Text)  GOALS:   DISCHARGE GOALS (in preparation for going home/rehab):  3 days  1. Ms. Sadie Burnett will perform one lower body dressing activity with minimal assistance with adaptive equipment to demonstrate improved functional mobility and safety. GOAL MET 8/8/2018    2. Ms. Sadie Burnett will perform one lower body bathing activity with minimal  assistance with adaptive equipment to demonstrate improved functional mobility and safety. GOAL MET 8/8/2018  3. Ms. Sadie Burnett will perform toileting/toilet transfer with contact guard assistance with adaptive equipment to demonstrate improved functional mobility and safety. progressing  4. Ms. Sadie Burnett will perform shower transfer with contact guard assistance with adaptive equipment to demonstrate improved functional mobility and safety. progressing  5. Ms. Sadie Burnett will state JANE precautions with two verbal cues to demonstrate improved functional mobility and safety. GOAL MET 8/8/2018          JOINT CAMP OCCUPATIONAL THERAPY JANE: Daily Note and AM 8/8/2018  INPATIENT: Hospital Day: 2  [place] / Product Type: Medicare /      NAME/AGE/GENDER: Reta Lopez is a 80 y.o. female   PRIMARY DIAGNOSIS:  UNILATERAL PRIMARY OSTEOARTHRITIS, LEFT HIP   Procedure(s) and Anesthesia Type:     * LEFT HIP ARTHROPLASTY TOTAL - Spinal (Left)  ICD-10: Treatment Diagnosis:    · Pain in left hip (M25.552)  · Stiffness of Left Hip, Not elsewhere classified (G11.480)      ASSESSMENT:      Ms. Sadie Burnett is s/p left JANE and presents with decreased weight bearing on left LE and decreased independence with functional mobility and activities of daily living.   Patient completed shower and dressing as charter below in ADL grid and is ambulating with rolling walker and min assist to [place] assist.  Patient has met 3/5 goals and plans to go to [facility] rehab facility for further therapy services OT reviewed hip precautions throughout session and issued long handled sponge for home use. Patient instructed to call for assistance when needing to get up from recliner and all needs in reach. Patient verbalized understanding of call light. This section established at most recent assessment   PROBLEM LIST (Impairments causing functional limitations):  1. Decreased Strength  2. Decreased ADL/Functional Activities  3. Decreased Transfer Abilities  4. Increased Pain  5. Increased Fatigue  6. Decreased Flexibility/Joint Mobility  7. Decreased Knowledge of Precautions   INTERVENTIONS PLANNED: (Benefits and precautions of occupational therapy have been discussed with the patient.)  1. Activities of daily living training  2. Adaptive equipment training  3. Balance training  4. Clothing management  5. Donning&doffing training  6. Theraputic activity     TREATMENT PLAN: Frequency/Duration: Follow patient 1-2tx to address above goals. Rehabilitation Potential For Stated Goals: Excellent     RECOMMENDED REHABILITATION/EQUIPMENT: (at time of discharge pending progress): Home Health: Physical Therapy. OCCUPATIONAL PROFILE AND HISTORY:   History of Present Injury/Illness (Reason for Referral): Pt presents this date s/p (Left) JANE. Past Medical History/Comorbidities:   Ms. Deyvi Schneider  has a past medical history of Anemia; HTN (hypertension), benign; Hypercholesteremia; and Migraine. Ms. Deyvi Schneider  has a past surgical history that includes hx hysterectomy; hx refractive surgery; hx back surgery; hx orthopaedic; hx tonsillectomy; endoscopy, colon, diagnostic (10/3/12); and hx appendectomy.   Social History/Living Environment:   Home Environment: Private residence  # Steps to Enter: 2  One/Two Story Residence: One story  Living Alone: No  Support Systems: Family member(s)  Patient Expects to be Discharged to[de-identified] Private residence  Current DME Used/Available at Home: None  Prior Level of Function/Work/Activity:  Independent prior. Number of Personal Factors/Comorbidities that affect the Plan of Care: Brief history (0):  LOW COMPLEXITY   ASSESSMENT OF OCCUPATIONAL PERFORMANCE[de-identified]   Most Recent Physical Functioning:   Balance  Sitting: Intact  Standing: Pull to stand; With support                  LLE AROM  L Hip Flexion: 90  L Hip ABduction: 10  L Knee Extension: 0           Mental Status  Neurologic State: Alert; Appropriate for age  Orientation Level: Appropriate for age  Cognition: Appropriate decision making; Appropriate for age attention/concentration; Appropriate safety awareness; Follows commands  Perception: Appears intact  Perseveration: No perseveration noted  Safety/Judgement: Awareness of environment; Fall prevention                Basic ADLs (From Assessment) Complex ADLs (From Assessment)   Basic ADL  Feeding: Independent  Oral Facial Hygiene/Grooming: Setup  Bathing: Minimum assistance  Type of Bath: Chlorhexidine (CHG), Shower  Upper Body Dressing: Setup  Lower Body Dressing: Total assistance  Toileting: Moderate assistance     Grooming/Bathing/Dressing Activities of Daily Living   Grooming  Grooming Assistance: Supervision/set up  Washing Face: Supervision/set-up  Washing Hands: Supervision/set-up  Brushing Teeth: Stand-by assistance (standing at sink)  Brushing/Combing Hair: Stand-by assistance (standing at sink) Cognitive Retraining  Safety/Judgement: Awareness of environment; Fall prevention   Upper Body Bathing  Bathing Assistance: Supervision/set-up  Position Performed: Seated in chair  Adaptive Equipment: Grab bar; Shower chair     Lower Body Bathing  Bathing Assistance: Minimum assistance  Perineal  : Stand-by assistance;Contact guard assistance  Position Performed: Standing  Lower Body : Minimum assistance  Position Performed: Seated in chair;Standing  Adaptive Equipment: Grab bar;Long handled sponge; Shower chair     Upper Body Dressing Assistance  Dressing Assistance: Supervision/set-up  Bra: Supervision/set-up  Hospital Gown: Supervision/ set-up  Pullover Shirt: Supervision/set-up Functional Transfers  Bathroom Mobility: Minimum assistance;Contact guard assistance  Toilet Transfer : Contact guard assistance;Minimum assistance  Shower Transfer: Contact guard assistance;Minimum assistance  Adaptive Equipment: Grab bars; Shower chair with back   Lower Body Dressing Assistance  Dressing Assistance: Minimum assistance  Underpants: Contact guard assistance;Minimum assistance  Pants With Button/Zipper: Minimum assistance  Socks: Contact guard assistance  Adaptive Equipment Used: Long handled shoe horn;Reacher;Sock aid; Walker Bed/Mat Mobility  Supine to Sit:  (up in chair)  Sit to Stand: Contact guard assistance         Physical Skills Involved:  1. Range of Motion  2. Balance  3. Strength Cognitive Skills Affected (resulting in the inability to perform in a timely and safe manner):  1. NT Psychosocial Skills Affected:  1. nt   Number of elements that affect the Plan of Care: 1-3:  LOW COMPLEXITY   CLINICAL DECISION MAKING:   Jamel Cummings AM-PAC 6 Clicks   Daily Activity Inpatient Short Form  How much help from another person does the patient currently need. .. Total A Lot A Little None   1. Putting on and taking off regular lower body clothing? [x] 1   [] 2   [] 3   [] 4   2. Bathing (including washing, rinsing, drying)? [] 1   [x] 2   [] 3   [] 4   3. Toileting, which includes using toilet, bedpan or urinal?   [] 1   [x] 2   [] 3   [] 4   4. Putting on and taking off regular upper body clothing? [] 1   [] 2   [] 3   [x] 4   5. Taking care of personal grooming such as brushing teeth? [] 1   [] 2   [] 3   [x] 4   6. Eating meals? [] 1   [] 2   [] 3   [x] 4   © 2007, Trustees of [employer], under license to [employer].  All rights reserved     Score:  Initial: 17 Most Recent: X (Date: -- )    Interpretation of Tool:  Represents activities that are increasingly more difficult (i.e. Bed mobility, Transfers, Gait). Score 24 23 22-20 19-15 14-10 9-7 6     Modifier CH CI CJ CK CL CM CN      ? Self Care:     - CURRENT STATUS: CK - 40%-59% impaired, limited or restricted    - GOAL STATUS: CJ - 20%-39% impaired, limited or restricted    - D/C STATUS:  ---------------To be determined---------------  [place] / Product Type: Medicare /      Medical Necessity:     · Skilled intervention continues to be required due to Deficits noted above. Reason for Services/Other Comments:  · Patient continues to require skilled intervention due to new JANE. Use of outcome tool(s) and clinical judgement create a POC that gives a: MODERATE COMPLEXITY            TREATMENT:   (In addition to Assessment/Re-Assessment sessions the following treatments were rendered)     Pre-treatment Symptoms/Complaints:    Pain: Initial:   Pain Intensity 1: 0 shower Post Session:  0/10 at rest     Self Care: (27): Procedure(s) (per grid) utilized to improve and/or restore self-care/home management as related to dressing, bathing, toileting and grooming. Required minimal visual, verbal and tactile cueing to facilitate activities of daily living skills, compensatory activities and hip   kit training. Treatment/Session Assessment:     Response to Treatment:  Good, sitting up in recliner, plans for rehab at [facility]    Education:  [] Home Exercises  [x] Fall Precautions  [x] Hip Precautions [] Going Home Video  [] Knee/Hip Prosthesis Review  [x] Walker Management/Safety [x] Adaptive Equipment as Needed       Interdisciplinary Collaboration:   o Physical Therapist  o Occupational Therapist  o Registered Nurse    After treatment position/precautions:   o Up in chair  o Bed/Chair-wheels locked  o Call light within reach  o RN notified     Compliance with Program/Exercises: compliant all of the time.     Recommendations/Intent for next treatment session:  Treatment next visit will focus on increasing Ms. Dye's independence with bed mobility, transfers, self care, functional mobility, modalities for pain, and patient education.       Total Treatment Duration:27  OT Patient Time In/Time Out  Time In: 0870  Time Out: [address] [place], OT

## 2018-08-08 NOTE — PROGRESS NOTES
Problem: Mobility Impaired (Adult and Pediatric)  Goal: *Acute Goals and Plan of Care (Insert Text)  GOALS (1-4 days):  (1.)Ms. Joey Padilla will move from supine to sit and sit to supine  in bed with INDEPENDENT. (2.)Ms. Joey Padilla will transfer from bed to chair and chair to bed with INDEPENDENT using the least restrictive device. (3.)Ms. Joey Padilla will ambulate with INDEPENDENT for 250 feet with the least restrictive device. (4.)Ms. Joey Padilla will ambulate up/down 3 steps with bilateral  railing with MINIMAL ASSIST with no device. (5.)Ms. Joey Padilla will state/observe JANE precautions with 0 verbal cues. ________________________________________________________________________________________________    Outcome: Progressing Towards Goal    PHYSICAL THERAPY Joint camp Jane: Daily Note, AM 8/8/2018  INPATIENT: Hospital Day: 2  [place]: [place] / Product Type: Medicare /      NAME/AGE/GENDER: Feroz Youssef is a 80 y.o. female   PRIMARY DIAGNOSIS:  UNILATERAL PRIMARY OSTEOARTHRITIS, LEFT HIP   Procedure(s) and Anesthesia Type:     * LEFT HIP ARTHROPLASTY TOTAL - Spinal (Left)  ICD-10: Treatment Diagnosis:    · Pain in left hip (M25.552)  · Stiffness of Left Hip, Not elsewhere classified (M25.652)  · Difficulty in walking, Not elsewhere classified (R26.2)  · Other abnormalities of gait and mobility (R26.89)      ASSESSMENT:     Ms. Joey Padilla presents status post left total hip replacement with decreased independence with functional mobility. Therapy will maximize independence with functional mobility. Pt. Doing well this am.  She did jane exercises with cues and assistance and ambulated with walker CGA. She plans on [facility] for rehab. Reviewed jane precautions. This section established at most recent assessment   PROBLEM LIST (Impairments causing functional limitations):  1. Decreased Strength  2. Decreased Transfer Abilities  3.  Decreased Ambulation Ability/Technique  4. Decreased Balance  5. Increased Pain  6. Decreased Flexibility/Joint Mobility   INTERVENTIONS PLANNED: (Benefits and precautions of physical therapy have been discussed with the patient.)  1. Bed Mobility  2. Gait Training  3. Home Exercise Program (HEP)  4. Therapeutic Exercise/Strengthening  5. Transfer Training  6. Range of Motion: active/assisted/passive  7. Therapeutic Activities  8. Group Therapy     TREATMENT PLAN: Frequency/Duration: Follow patient BID for duration of hospital stay to address above goals. Rehabilitation Potential For Stated Goals: Good     RECOMMENDED REHABILITATION/EQUIPMENT: (at time of discharge pending progress): Rehab. HISTORY:   History of Present Injury/Illness (Reason for Referral):  Pt is status post left total hip replacement. Past Medical History/Comorbidities:   Ms. Diana Joseph  has a past medical history of Anemia; HTN (hypertension), benign; Hypercholesteremia; and Migraine. Ms. Diana Joseph  has a past surgical history that includes hx hysterectomy; hx refractive surgery; hx back surgery; hx orthopaedic; hx tonsillectomy; endoscopy, colon, diagnostic (10/3/12); and hx appendectomy. Social History/Living Environment:   Home Environment: Private residence  # Steps to Enter: 2  One/Two Story Residence: One story  Living Alone: No  Support Systems: Family member(s)  Patient Expects to be Discharged to[de-identified] Private residence  Current DME Used/Available at Home: None  Prior Level of Function/Work/Activity:  Independent with functional mobility.    Number of Personal Factors/Comorbidities that affect the Plan of Care: 0: LOW COMPLEXITY   EXAMINATION:   Most Recent Physical Functioning:                 LLE AROM  L Hip Flexion: 90  L Hip ABduction: 10  L Knee Extension: 0          Bed Mobility  Supine to Sit:  (up in chair)    Transfers  Sit to Stand: Contact guard assistance  Stand to Sit: Contact guard assistance    Balance  Sitting: Intact  Standing: Pull to stand; With support              Weight Bearing Status  Left Side Weight Bearing: As tolerated  Distance (ft): 20 Feet (ft)  Ambulation - Level of Assistance: Contact guard assistance  Assistive Device: Walker, rolling  Speed/Rashmi: Delayed  Step Length: Left shortened;Right shortened  Stance: Left decreased  Gait Abnormalities: Antalgic  Interventions: Safety awareness training;Verbal cues     Braces/Orthotics: none    Left Hip Cold  Type: Cold/ice pack      Body Structures Involved:  1. Bones  2. Joints  3. Muscles  4. Ligaments Body Functions Affected:  1. Neuromusculoskeletal  2. Movement Related Activities and Participation Affected:  1. Mobility   Number of elements that affect the Plan of Care: 4+: HIGH COMPLEXITY   CLINICAL PRESENTATION:   Presentation: Stable and uncomplicated: LOW COMPLEXITY   CLINICAL DECISION MAKIN[address] [place] [id number] AM-PAC 6 Clicks   Basic Mobility Inpatient Short Form  How much difficulty does the patient currently have. .. Unable A Lot A Little None   1. Turning over in bed (including adjusting bedclothes, sheets and blankets)? [] 1   [] 2   [x] 3   [] 4   2. Sitting down on and standing up from a chair with arms ( e.g., wheelchair, bedside commode, etc.)   [] 1   [] 2   [x] 3   [] 4   3. Moving from lying on back to sitting on the side of the bed? [] 1   [] 2   [x] 3   [] 4   How much help from another person does the patient currently need. .. Total A Lot A Little None   4. Moving to and from a bed to a chair (including a wheelchair)? [] 1   [] 2   [x] 3   [] 4   5. Need to walk in hospital room? [] 1   [] 2   [x] 3   [] 4   6. Climbing 3-5 steps with a railing? [] 1   [] 2   [x] 3   [] 4   © , Trustees of [address] [place] [address] [address] [id number], under license to [employer].  All rights reserved        Score:  Initial: 18 Most Recent: X (Date: -- )    Interpretation of Tool:  Represents activities that are increasingly more difficult (i.e. Bed mobility, Transfers, Gait).   Score 24 23 22-20 19-15 14-10 9-7 6     Modifier CH CI CJ CK CL CM CN      ? Mobility - Walking and Moving Around:     - CURRENT STATUS: CK - 40%-59% impaired, limited or restricted    - GOAL STATUS: CK - 40%-59% impaired, limited or restricted    - D/C STATUS:  CK - 40%-59% impaired, limited or restricted  [place] / Product Type: Medicare /      Medical Necessity:     · Skilled intervention continues to be required due to decreased mobility ability. Reason for Services/Other Comments:  · Patient continues to require skilled intervention due to decreased mobility ability. Use of outcome tool(s) and clinical judgement create a POC that gives a: Clear prediction of patient's progress: LOW COMPLEXITY            TREATMENT:   (In addition to Assessment/Re-Assessment sessions the following treatments were rendered)     Pre-treatment Symptoms/Complaints:  Hip sore  Pain: Initial:      Post Session: 3     Gait Training (10 Minutes):  Gait training to improve and/or restore physical functioning as related to mobility, strength and balance. Ambulated 20 Feet (ft) with Contact guard assistance using a Walker, rolling and minimal Safety awareness training;Verbal cues related to their stance phase to promote proper body alignment, promote proper body posture and promote proper body mechanics. .  Therapeutic Exercise: (15 Minutes):  Exercises per grid below to improve mobility and strength. Required minimal visual, verbal and manual cues to promote proper body alignment, promote proper body posture and promote proper body mechanics. Progressed range and repetitions as indicated.          Date:  8/7 Date:  8/8 Date:     ACTIVITY/EXERCISE AM PM AM PM AM PM   GROUP THERAPY  []  []  []  []  []  []   Ankle Pumps 5  15a      Quad Sets 10  10a      Gluteal Sets 10  10a      Hip ABd/ADduction 5  10aa      Straight Leg Raises         Knee Slides 10  10aa      Short Arc Quads         Long Arc Quads         Chair Slides                  B = bilateral; AA = active assistive; A = active; P = passive      Treatment/Session Assessment:     Response to Treatment:  Pt. Did fine    Education:  [x] Home Exercises  [x] Fall Precautions  [x] Hip Precautions [] D/C Instruction Review  [x] Knee/Hip Prosthesis Review  [x] Walker Management/Safety [] Adaptive Equipment as Needed       Interdisciplinary Collaboration:   o Physical Therapist  o Occupational Therapist  o Registered Nurse    After treatment position/precautions:   o left with OT    Compliance with Program/Exercises: compliant most of the time. Recommendations/Intent for next treatment session:  Treatment next visit will focus on increasing Ms. Dye's independence with bed mobility, transfers, gait training, strength/ROM exercises, modalities for pain, and patient education.       Total Treatment Duration:  PT Patient Time In/Time Out  Time In: [facility]  Time Out: [facility],

## 2018-08-08 NOTE — PROGRESS NOTES
2018         Post Op day: 1 Day Post-Op   Admit Diagnosis: Unilateral primary osteoarthritis, left hip [M16.12]  LAB:    Recent Results (from the past 24 hour(s))   HEMOGLOBIN    Collection Time: 18  7:28 PM   Result Value Ref Range    HGB 11.6 (L) 11.7 - 15.4 g/dL   HEMOGLOBIN    Collection Time: 18  5:58 AM   Result Value Ref Range    HGB 11.2 (L) 11.7 - 15.4 g/dL     Vital Signs:    Patient Vitals for the past 8 hrs:   BP Temp Pulse Resp SpO2   18 0725 131/63 98.3 °F (36.8 °C) 68 18 98 %   18 0348 117/67 97.4 °F (36.3 °C) (!) 57 16 96 %   18 0027 128/76 97.5 °F (36.4 °C) (!) 59 16 95 %     Temp (24hrs), Av.7 °F (36.5 °C), Min:97.4 °F (36.3 °C), Max:98.3 °F (36.8 °C)    Pain Control:   Pain Assessment  Pain Scale 1: Visual  Pain Intensity 1: 3  Pain Onset 1: YRS  Pain Location 1: Hip  Pain Orientation 1: Left  Pain Description 1: Intermittent  Pain Intervention(s) 1: Medication (see MAR)  Subjective: Doing well, pain is well controlled, no complaints     Objective:  No Acute Distress, Alert and Oriented, left hip dressing is C/D/I. Posterior thigh and calves are soft, NT. Neurovascular exam is normal       Assessment:   Patient Active Problem List   Diagnosis Code    Anemia D64.9    HTN (hypertension), benign I10    Hypercholesteremia E78.00    Migraine G43.909    Insomnia G47.00    Depression F32.9    Abnormal EKG X71.48    Systolic murmur N24.4    Essential hypertension with goal blood pressure less than 130/85 I10    Mixed hyperlipidemia E78.2    Arthritis of left hip M16.12       Status Post Procedure(s) (LRB):  LEFT HIP ARTHROPLASTY TOTAL (Left)        Plan: Continue Physical Therapy, Monitor Hgb. ASA/SCDs for DVT prophylaxis. Planning for rehab placement.   Patient seen by Dr. Ashia Lara this AM.   Signed By: CEDRICK Coronel

## 2018-08-08 NOTE — PROGRESS NOTES
Problem: Mobility Impaired (Adult and Pediatric)  Goal: *Acute Goals and Plan of Care (Insert Text)  GOALS (1-4 days):  (1.)Ms. Reyes Dumont will move from supine to sit and sit to supine  in bed with INDEPENDENT. (2.)Ms. Reyes Dumont will transfer from bed to chair and chair to bed with INDEPENDENT using the least restrictive device. (3.)Ms. Reyes Dumont will ambulate with INDEPENDENT for 250 feet with the least restrictive device. (4.)Ms. Reyes Dumont will ambulate up/down 3 steps with bilateral  railing with MINIMAL ASSIST with no device. (5.)Ms. Reyes Dumont will state/observe JANE precautions with 0 verbal cues. ________________________________________________________________________________________________    Outcome: Progressing Towards Goal    PHYSICAL THERAPY [facility]: Daily Note, PM 8/8/2018  INPATIENT: Hospital Day: 2  [place] / [place] / Product Type: Medicare /      NAME/AGE/GENDER: Connie Harris is a 80 y.o. female   PRIMARY DIAGNOSIS:  UNILATERAL PRIMARY OSTEOARTHRITIS, LEFT HIP   Procedure(s) and Anesthesia Type:     * LEFT HIP ARTHROPLASTY TOTAL - Spinal (Left)  ICD-10: Treatment Diagnosis:    · Pain in left hip (M25.552)  · Stiffness of Left Hip, Not elsewhere classified (M25.652)  · Difficulty in walking, Not elsewhere classified (R26.2)  · Other abnormalities of gait and mobility (R26.89)      ASSESSMENT:     Ms. Reyes Dumont presents status post left total hip replacement with decreased independence with functional mobility. Therapy will maximize independence with functional mobility. Pt. Babara Hamman to do well. Reports the knee pain that she had is gone. She increased gait distance with walker and did jane exercises with cues. Plans on rehab. Reviewed jane precautions. This section established at most recent assessment   PROBLEM LIST (Impairments causing functional limitations):  1. Decreased Strength  2. Decreased Transfer Abilities  3.  Decreased Ambulation Ability/Technique  4. Decreased Balance  5. Increased Pain  6. Decreased Flexibility/Joint Mobility   INTERVENTIONS PLANNED: (Benefits and precautions of physical therapy have been discussed with the patient.)  1. Bed Mobility  2. Gait Training  3. Home Exercise Program (HEP)  4. Therapeutic Exercise/Strengthening  5. Transfer Training  6. Range of Motion: active/assisted/passive  7. Therapeutic Activities  8. Group Therapy     TREATMENT PLAN: Frequency/Duration: Follow patient BID for duration of hospital stay to address above goals. Rehabilitation Potential For Stated Goals: Good     RECOMMENDED REHABILITATION/EQUIPMENT: (at time of discharge pending progress): Rehab. HISTORY:   History of Present Injury/Illness (Reason for Referral):  Pt is status post left total hip replacement. Past Medical History/Comorbidities:   Ms. Gregg Atkinson  has a past medical history of Anemia; HTN (hypertension), benign; Hypercholesteremia; and Migraine. Ms. Gregg Atkinson  has a past surgical history that includes hx hysterectomy; hx refractive surgery; hx back surgery; hx orthopaedic; hx tonsillectomy; endoscopy, colon, diagnostic (10/3/12); and hx appendectomy. Social History/Living Environment:   Home Environment: Private residence  # Steps to Enter: 2  One/Two Story Residence: One story  Living Alone: No  Support Systems: Family member(s)  Patient Expects to be Discharged to[de-identified] Private residence  Current DME Used/Available at Home: None  Prior Level of Function/Work/Activity:  Independent with functional mobility.    Number of Personal Factors/Comorbidities that affect the Plan of Care: 0: LOW COMPLEXITY   EXAMINATION:   Most Recent Physical Functioning:                 LLE AROM  L Hip Flexion: 90  L Hip ABduction: 10  L Knee Extension: 0          Bed Mobility  Supine to Sit:  (up in chair)    Transfers  Sit to Stand: Contact guard assistance  Stand to Sit: Contact guard assistance    Balance  Sitting: Intact  Standing: With support              Weight Bearing Status  Left Side Weight Bearing: As tolerated  Distance (ft): 206 Feet (ft) (x 2)  Ambulation - Level of Assistance: Contact guard assistance  Assistive Device: Walker, rolling  Speed/Rashmi: Delayed  Step Length: Left shortened;Right shortened  Stance: Left decreased  Gait Abnormalities: Antalgic;Decreased step clearance  Interventions: Safety awareness training;Verbal cues     Braces/Orthotics: none    Left Hip Cold  Type: Cold/ice pack      Body Structures Involved:  1. Bones  2. Joints  3. Muscles  4. Ligaments Body Functions Affected:  1. Neuromusculoskeletal  2. Movement Related Activities and Participation Affected:  1. Mobility   Number of elements that affect the Plan of Care: 4+: HIGH COMPLEXITY   CLINICAL PRESENTATION:   Presentation: Stable and uncomplicated: LOW COMPLEXITY   CLINICAL DECISION MAKIN[address] [place] [id number] AM-PAC 6 Clicks   Basic Mobility Inpatient Short Form  How much difficulty does the patient currently have. .. Unable A Lot A Little None   1. Turning over in bed (including adjusting bedclothes, sheets and blankets)? [] 1   [] 2   [x] 3   [] 4   2. Sitting down on and standing up from a chair with arms ( e.g., wheelchair, bedside commode, etc.)   [] 1   [] 2   [x] 3   [] 4   3. Moving from lying on back to sitting on the side of the bed? [] 1   [] 2   [x] 3   [] 4   How much help from another person does the patient currently need. .. Total A Lot A Little None   4. Moving to and from a bed to a chair (including a wheelchair)? [] 1   [] 2   [x] 3   [] 4   5. Need to walk in hospital room? [] 1   [] 2   [x] 3   [] 4   6. Climbing 3-5 steps with a railing? [] 1   [] 2   [x] 3   [] 4   © , Trustees of [address] [place] [id number], under license to [employer].  All rights reserved        Score:  Initial: 18 Most Recent: X (Date: -- )    Interpretation of Tool:  Represents activities that are increasingly more difficult (i.e. Bed mobility, Transfers, Gait). Score 24 23 22-20 19-15 14-10 9-7 6     Modifier CH CI CJ CK CL CM CN      ? Mobility - Walking and Moving Around:     - CURRENT STATUS: CK - 40%-59% impaired, limited or restricted    - GOAL STATUS: CK - 40%-59% impaired, limited or restricted    - D/C STATUS:  CK - 40%-59% impaired, limited or restricted  [place] / [place] / Product Type: Medicare /      Medical Necessity:     · Skilled intervention continues to be required due to decreased mobility ability. Reason for Services/Other Comments:  · Patient continues to require skilled intervention due to decreased mobility ability. Use of outcome tool(s) and clinical judgement create a POC that gives a: Clear prediction of patient's progress: LOW COMPLEXITY            TREATMENT:   (In addition to Assessment/Re-Assessment sessions the following treatments were rendered)     Pre-treatment Symptoms/Complaints:  Hip sore  Pain: Initial:      Post Session: 5 with gait     Gait Training (15 Minutes):  Gait training to improve and/or restore physical functioning as related to mobility, strength and balance. Ambulated 206 Feet (ft) (x 2) with Contact guard assistance using a Walker, rolling and minimal Safety awareness training;Verbal cues related to their stance phase to promote proper body alignment, promote proper body posture and promote proper body mechanics. .  Therapeutic Exercise: (45 Minutes):  Exercises per grid below to improve mobility and strength. Required minimal visual, verbal and manual cues to promote proper body alignment, promote proper body posture and promote proper body mechanics. Progressed range and repetitions as indicated.          Date:  8/7 Date:  8/8 Date:     ACTIVITY/EXERCISE AM PM AM PM AM PM   GROUP THERAPY  []  []  []  [x]  []  []   Ankle Pumps 5  15a 15a     Quad Sets 10  10a 15a     Gluteal Sets 10  10a 15a     Hip ABd/ADduction 5  10aa 15a Straight Leg Raises         Knee Slides 10  10aa 15a     Short Arc Quads    15a     Long Arc Quads    15a     Chair Slides                  B = bilateral; AA = active assistive; A = active; P = passive      Treatment/Session Assessment:     Response to Treatment:  Pt. Doing well. Education:  [x] Home Exercises  [x] Fall Precautions  [x] Hip Precautions [x] D/C Instruction Review  [x] Knee/Hip Prosthesis Review  [x] Walker Management/Safety [] Adaptive Equipment as Needed       Interdisciplinary Collaboration:   o Physical Therapist  o Physical Therapy Assistant  o Rehabilitation Attendant    After treatment position/precautions:   o Up in chair  o Bed/Chair-wheels locked  o Bed in low position  o Call light within reach    Compliance with Program/Exercises: compliant most of the time. Recommendations/Intent for next treatment session:  Treatment next visit will focus on increasing Ms. Arroyos independence with bed mobility, transfers, gait training, strength/ROM exercises, modalities for pain, and patient education.       Total Treatment Duration:  PT Patient Time In/Time Out  Time In: 1300  Time Out: 2202 Mp Adams, PT

## 2018-08-08 NOTE — PROGRESS NOTES
Care Management Interventions  Mode of Transport at Discharge: Self  Transition of Care Consult (CM Consult): SNF  Partner SNF: Yes  Discharge Durable Medical Equipment: No  Physical Therapy Consult: Yes  Occupational Therapy Consult: Yes  Current Support Network: Own Home  Confirm Follow Up Transport: Family  Plan discussed with Pt/Family/Caregiver: Yes  Freedom of Choice Offered: Yes  Discharge Location  Discharge Placement: Skilled nursing facility    Patient is a 80y.o. year old female admitted for Left JANE . She and her dtr have requesting rehab at [facility] on d/c. Referral sent and approval obtained for admission on Friday 8-10 after her three night Inpt. Dtr has already completed admission paperwork. Will follow until discharge.

## 2018-08-08 NOTE — DISCHARGE SUMMARY
REHABILITATION DISCHARGE SUMMARY     Patient: Wilma Ponce MRN: [id number]  SSN: [id number]    YOB: 1928  Age: 80 y.o. Sex: female      Date: 8/8/2018  Admit Date: 8/7/2018  Discharge Date: 8/8/2018    Admitting Physician: Juan Miguel Moseley MD   Primary Care Physician: Valeria Tejeda MD         Chief Complaint : Gait dysfunction secondary to below. Admit Diagnosis: Unilateral primary osteoarthritis, left hip [M16.12]  left  total hip arthroplasty   8/7/2018  Pain  DVT risk  Post op hemorrhagic anemia  Acute Rehab Dx:  Gait impairment  Mobility and ambulation deficits  Self Care/ADL deficits       HPI: Wilma Ponce is a 80 y.o. female patient at [address] [place] [address] who was admitted on 8/7/2018  by Juan Miguel Moseley MD with below mentioned medical history, is being seen for Physical Medicine and Rehabilitation.     Wilma Ponce had painful left hip due to severe DJD, that has been refractory to conservative management. The symptoms were aggravated by weight bearing, walking and activity, limiting daily function. patient underwent a left JANE per Dr. Juan Miguel Moseley MD on 8/7/2018. The patient's adilene operative course has been uneventful. Patient is to be WBAT LLE. Hip precautions are to be followed strictly. Patient is starting to work with acute PT, OT, standing, taking steps with CGA so far. Patient still shows significant functional deficits due to right hip pain, decreased ROM and strength. Wilma Ponce is seen and examined today. Medical Records reviewed. Patient denies any other major pre morbid functional deficits.   Patient has been active and independent prior to admission, but limited by hip pain.        Rehabiitation Course:   Functional Level At Discharge: [address]: Home Situation  Home Environment: Private residence (08/08/18 0200)  # Steps to Enter: 2 (08/08/18 0200)  One/Two Story Residence: One story (08/08/18 0200)  Living Alone: No (08/08/18 0200)  Support Systems: Family member(s) (08/08/18 0200)  Patient Expects to be Discharged to[de-identified] Private residence (08/08/18 0200)  Current DME Used/Available at Home: None (08/08/18 0200)     Past Medical History:   Diagnosis Date    Anemia     EGD 10-3-12:  schatzki's ring, duodenal ulcer, gastritis. Colonoscopy 10-3-12: diverticulosis, colon polyp. repeat colonoscopy 10 years    HTN (hypertension), benign     Hypercholesteremia     Migraine       Past Surgical History:   Procedure Laterality Date    ENDOSCOPY, COLON, DIAGNOSTIC  10/3/12    diverticulosis, colon polyp    HX APPENDECTOMY      HX BACK SURGERY      HX HYSTERECTOMY      HX ORTHOPAEDIC      right wrist, left shoulder    HX REFRACTIVE SURGERY      HX TONSILLECTOMY        Family History   Problem Relation Age of Onset    Cancer Sister      breast      Social History   Substance Use Topics    Smoking status: Never Smoker    Smokeless tobacco: Never Used    Alcohol use No       Allergies   Allergen Reactions    Codeine Other (comments)     GI upset    Penicillins Rash       Prior to Admission medications    Medication Sig Start Date End Date Taking? Authorizing Provider   aspirin delayed-release 81 mg tablet Take 1 Tab by mouth every twelve (12) hours every twelve (12) hours. 8/8/18  Yes CEDRICK Santos   HYDROmorphone (DILAUDID) 2 mg tablet Take 1 Tab by mouth every four (4) hours as needed. Max Daily Amount: 12 mg. 8/8/18  Yes Cyrena Epley Looper, PA   vit C/E/Zn/coppr/lutein/zeaxan (PRESERVISION AREDS 2 PO) Take  by mouth. Yes Historical Provider   carvedilol (COREG) 6.25 mg tablet Take 1 Tab by mouth two (2) times daily (with meals). Patient taking differently: Take 6.25 mg by mouth two (2) times daily (with meals). Take morning of surgery per anesthesia guidelines. 5/31/18  Yes Callie Wood MD   aspirin delayed-release 81 mg tablet Take  by mouth daily.    Yes Historical Provider   DISABLED PLACARD (DISABLED PLACARD) [place] handicapped tag -   Diagnosis - left knee arthritis M17.12 5/31/18   Callie Wood MD       Current Medications:  Current Facility-Administered Medications   Medication Dose Route Frequency Provider Last Rate Last Dose    carvedilol (COREG) tablet 6.25 mg  6.25 mg Oral BID WITH MEALS Cyrena Epley Looper, PA   6.25 mg at 08/08/18 1719    0.9% sodium chloride infusion  100 mL/hr IntraVENous CONTINUOUS Cyrena Epley Looper,  mL/hr at 08/08/18 0100 100 mL/hr at 08/08/18 0100    sodium chloride (NS) flush 5-10 mL  5-10 mL IntraVENous PRN CEDRICK Gold        acetaminophen (TYLENOL) tablet 1,000 mg  1,000 mg Oral Q6H Cyrena Epley Looper, PA   1,000 mg at 08/08/18 1719    celecoxib (CELEBREX) capsule 200 mg  200 mg Oral Q12H Cyrena Epley Looper, PA   200 mg at 08/08/18 0739    naloxone (NARCAN) injection 0.2-0.4 mg  0.2-0.4 mg IntraVENous Q10MIN PRN CEDRICK Gold        dexamethasone (DECADRON) injection 10 mg  10 mg IntraVENous ONCE CEDRICK Gold        diphenhydrAMINE (BENADRYL) capsule 25 mg  25 mg Oral Q4H PRN CEDRICK Gold        zolpidem (AMBIEN) tablet 5 mg  5 mg Oral QHS PRN CEDRICK Gold        aspirin delayed-release tablet 81 mg  81 mg Oral Q12H CEDRICK Santos   81 mg at 08/08/18 0740    HYDROmorphone (PF) (DILAUDID) injection 1 mg  1 mg IntraVENous Q3H PRN CEDRICK Gold        senna-docusate (PERICOLACE) 8.6-50 mg per tablet 2 Tab  2 Tab Oral DAILY CEDRICK Gold   2 Tab at 08/08/18 0740    ondansetron (ZOFRAN ODT) tablet 8 mg  8 mg Oral Q8H PRN Israel Irizarry MD   8 mg at 08/07/18 1654    promethazine (PHENERGAN) injection 6.25 mg  6.25 mg IntraMUSCular Q6H PRN Israel Irizarry MD   6.25 mg at 08/08/18 1600    HYDROmorphone (DILAUDID) tablet 2 mg  2 mg Oral Q4H PRN Israel Irizarry MD   2 mg at 08/08/18 1719        Review of Systems: Denies chest pain, shortness of breath, cough, headache, visual problems, abdominal pain, dysurea, calf pain. Pertinent positives are as noted in the medical records and unremarkable otherwise. Vital Signs: Patient Vitals for the past 8 hrs:   BP Temp Pulse Resp SpO2   08/08/18 1150 127/70 98.6 °F (37 °C) 70 16 98 %        Physical Exam:   General: Alert and age appropriately oriented. No acute cardio respiratory distress. HEENT: Normocephalic. Oral mucosa moist without cyanosis. Lungs: Clear to auscultation  bilaterally. Respiration even and unlabored   Heart: Regular rate and rhythm, S1, S2   No  murmurs, clicks, rub or gallops   Abdomen: Soft, non-tender, nondistended. Bowel sounds present   Genitourinary: defered   Neuromuscular:      Grossly no focal neurological deficits noted. L Ankle dorsiflexion 5/5   L Ankle plantarflexion 5/5  R Ankle dorsiflexion 5/5   R Ankle plantarflexion 5/5  No sensory deficits. Skin/extremity: No rashes, no erythema. Calves soft. Wound covered.      Skin Incision(s)/Wound(s):        Lab Review:   Recent Results (from the past 72 hour(s))   TYPE & SCREEN    Collection Time: 08/07/18  7:48 AM   Result Value Ref Range    Crossmatch Expiration 08/10/2018     ABO/Rh(D) Charlette Rater POSITIVE     Antibody screen NEG    GLUCOSE, POC    Collection Time: 08/07/18  7:58 AM   Result Value Ref Range    Glucose (POC) 116 (H) 65 - 100 mg/dL   HEMOGLOBIN    Collection Time: 08/07/18  7:28 PM   Result Value Ref Range    HGB 11.6 (L) 11.7 - 15.4 g/dL   HEMOGLOBIN    Collection Time: 08/08/18  5:58 AM   Result Value Ref Range    HGB 11.2 (L) 11.7 - 15.4 g/dL       PT Initial  PT Most Recent   AROM: Generally decreased, functional (08/07/18 1453) Generally decreased, functional (08/07/18 1453)         Strength: Generally decreased, functional (08/07/18 1453) Generally decreased, functional (08/07/18 1453)   Coordination: Generally decreased, functional (08/07/18 1453) Generally decreased, functional (08/07/18 1453)                     Distance (ft): 25 Feet (ft) (08/07/18 1453) 206 Feet (ft) (x 2) (08/08/18 1500)   Assistive Device: Walker, rolling (08/07/18 1453) Walker, rolling (08/08/18 1500)       OT Initial OT Most Recent                                               ST Initial ST Most Recent                        Active Problems:    Arthritis of left hip (8/7/2018)          Condition on discharge :  good  Rehabilitation  potential : Good     Goals in Rehab : Patient to reach maximal rehabilitation potential in functional mobility,ambulation and ADL/ self care skills ; to improve strength and endurance    Expected Length Of Stay : Depending on pace of progress in mobility and self care in PT and OT ,therapies    Discharge Instructions:  Rehabilitation Management/ Medical Management: 1. Devices:Walkers, Type: Rolling Walker  2. Consult:Rehab team including PT, OT,  and . 3. Disposition Rehab-discussed with patient. 4. Thigh-high or knee-high ERIKA's when out of bed. 5. DVT Prophylaxis - aspirin 81mg bid x 30days. Please notify surgeon at [address] [place] [address] if DVT diagnosed. 6. Incentive spirometer Q1H while awake  7. Post op hemorrhagic anemia- monitor. 8. Activity: WBAT LLE, total hip precautions. 9. Planned Labs: CBC,BMP  10. Pain Control: fair control. Continue scheduled tylenol, celebrex and  PRN meds. Continue current management. 11. Wound Care: May remove Aquacel 1 week post op and replace with Tegaderm and sterile gauze dressing. Keep wound clean and dry and reinforce dressing PRN. Remove staples 12-14 post surgery, when incision appears appropriately closed and apply benzoin and 1/2\" steristrips. 12. In case of complications: Please notify surgeon at [address] [place] [address] if suspect infection, cellulitis, wound dehiscence or instrument failure, and if considering starting antibiotic. Follow up with ORTHO per instructions.   [address] [place] [address] [phone]        Discharge Medications:          acetaminophen (TYLENOL) tablet 1,000 mg Ordered Dose: 1,000 mg Route: Oral Frequency: EVERY 8 HOURS x 1 week then change to prn.           aspirin delayed-release tablet 325 mg Ordered Dose: 325 mg Route: Oral Frequency:     senna-docusate (PERICOLACE) 8.6-50 mg per tablet 2 Tab Ordered Dose: 2 Tab Route: Oral Frequency: DAILY       Current Discharge Medication List      START taking these medications    Details   HYDROmorphone (DILAUDID) 2 mg tablet Take 1 Tab by mouth every four (4) hours as needed. Max Daily Amount: 12 mg. Associated Diagnoses: Arthritis of left hip         CONTINUE these medications which have CHANGED    Details   celecoxib (CELEBREX) capsule 200 mg Route: Take 1 Cap by mouth every twelve (12) hours every twelve (12) hours. - Oral       aspirin delayed-release 81 mg tablet Take 1 Tab by mouth EVERY 12 HOURS x 30 days then stop. Take with food or milk or full glass of water. CONTINUE these medications which have NOT CHANGED    Details   carvedilol (COREG) 6.25 mg tablet Take 1 Tab by mouth two (2) times daily (with meals). Associated Diagnoses: HTN (hypertension), benign           Associated Diagnoses: Arthritis of knee, left         STOP taking these medications       vit C/E/Zn/coppr/lutein/zeaxan (PRESERVISION AREDS 2 PO) Comments:   Reason for Stopping:               Discharge time: 35 minutes.     Signed By: Aurea Agustin MD     August 8, 2018

## 2018-08-08 NOTE — PROGRESS NOTES
Shift assessment completed. Pt is alert & oriented x4. Able to verbalize needs. Resting quietly with no distress noted. Dressing to left hip is clean, dry and intact. Scar Hai in place. Neurovascular and peripheral vascular checks WNL. Incentive Spirometry at bedside. Patient encouraged to use hourly x 10 repetitions. Patient voiding clear yellow urine without difficulty. Pain is being managed with Dilaudid with patient tolerating well. Bed low and locked. Call light within reach. Patient instructed to call for assistance. Pt verbalizes understanding. Will monitor.

## 2018-08-09 LAB — HGB BLD-MCNC: 10 G/DL (ref 11.7–15.4)

## 2018-08-09 PROCEDURE — 74011250637 HC RX REV CODE- 250/637: Performed by: ORTHOPAEDIC SURGERY

## 2018-08-09 PROCEDURE — 97150 GROUP THERAPEUTIC PROCEDURES: CPT

## 2018-08-09 PROCEDURE — 97110 THERAPEUTIC EXERCISES: CPT

## 2018-08-09 PROCEDURE — 97535 SELF CARE MNGMENT TRAINING: CPT

## 2018-08-09 PROCEDURE — 85018 HEMOGLOBIN: CPT

## 2018-08-09 PROCEDURE — 65270000029 HC RM PRIVATE

## 2018-08-09 PROCEDURE — 97116 GAIT TRAINING THERAPY: CPT

## 2018-08-09 PROCEDURE — 36415 COLL VENOUS BLD VENIPUNCTURE: CPT

## 2018-08-09 PROCEDURE — 74011250637 HC RX REV CODE- 250/637: Performed by: PHYSICIAN ASSISTANT

## 2018-08-09 RX ORDER — PROMETHAZINE HYDROCHLORIDE 25 MG/1
25 TABLET ORAL
Status: DISCONTINUED | OUTPATIENT
Start: 2018-08-09 | End: 2018-08-10 | Stop reason: HOSPADM

## 2018-08-09 RX ADMIN — ASPIRIN 81 MG: 81 TABLET, DELAYED RELEASE ORAL at 20:20

## 2018-08-09 RX ADMIN — ACETAMINOPHEN 1000 MG: 500 TABLET, FILM COATED ORAL at 17:30

## 2018-08-09 RX ADMIN — PROMETHAZINE HYDROCHLORIDE 12.5 MG: 25 TABLET ORAL at 13:57

## 2018-08-09 RX ADMIN — ACETAMINOPHEN 1000 MG: 500 TABLET, FILM COATED ORAL at 06:12

## 2018-08-09 RX ADMIN — HYDROMORPHONE HYDROCHLORIDE 2 MG: 2 TABLET ORAL at 01:22

## 2018-08-09 RX ADMIN — SENNOSIDES AND DOCUSATE SODIUM 2 TABLET: 8.6; 5 TABLET ORAL at 08:11

## 2018-08-09 RX ADMIN — PROMETHAZINE HYDROCHLORIDE 12.5 MG: 25 TABLET ORAL at 20:20

## 2018-08-09 RX ADMIN — HYDROMORPHONE HYDROCHLORIDE 2 MG: 2 TABLET ORAL at 05:39

## 2018-08-09 RX ADMIN — CARVEDILOL 6.25 MG: 6.25 TABLET, FILM COATED ORAL at 17:31

## 2018-08-09 RX ADMIN — ACETAMINOPHEN 1000 MG: 500 TABLET, FILM COATED ORAL at 00:12

## 2018-08-09 RX ADMIN — ASPIRIN 81 MG: 81 TABLET, DELAYED RELEASE ORAL at 08:10

## 2018-08-09 RX ADMIN — CELECOXIB 200 MG: 200 CAPSULE ORAL at 20:20

## 2018-08-09 NOTE — PROGRESS NOTES
Problem: Mobility Impaired (Adult and Pediatric)  Goal: *Acute Goals and Plan of Care (Insert Text)  GOALS (1-4 days):  (1.)Ms. Isaura Rider will move from supine to sit and sit to supine  in bed with INDEPENDENT. (2.)Ms. Isaura Rider will transfer from bed to chair and chair to bed with INDEPENDENT using the least restrictive device. (3.)Ms. Isaura Rider will ambulate with INDEPENDENT for 250 feet with the least restrictive device. (4.)Ms. Isaura Rider will ambulate up/down 3 steps with bilateral  railing with MINIMAL ASSIST with no device. (5.)Ms. Isaura Rider will state/observe SILVIANO precautions with 0 verbal cues. ________________________________________________________________________________________________    Outcome: Progressing Towards Goal    PHYSICAL THERAPY [facility]: Daily Note, AM 8/9/2018  INPATIENT: Hospital Day: 3  [place] / Product Type: Medicare /      NAME/AGE/GENDER: Saurabh Burnett is a 80 y.o. female   PRIMARY DIAGNOSIS:  UNILATERAL PRIMARY OSTEOARTHRITIS, LEFT HIP   Procedure(s) and Anesthesia Type:     * LEFT HIP ARTHROPLASTY TOTAL - Spinal (Left)  ICD-10: Treatment Diagnosis:    · Pain in left hip (M25.552)  · Stiffness of Left Hip, Not elsewhere classified (M25.652)  · Difficulty in walking, Not elsewhere classified (R26.2)  · Other abnormalities of gait and mobility (R26.89)      ASSESSMENT:     Ms. Isaura Rider presents status post left total hip replacement with decreased independence with functional mobility. Therapy will maximize independence with functional mobility. Pt. Continues to move well, reports some nausea this am.  She ambulated with walker CGA and did silviano exercises. She tends to put walker out too far at times and needs reminders. Plans on rehab tomorrow. Reviewed silviano precautions. This section established at most recent assessment   PROBLEM LIST (Impairments causing functional limitations):  1.  Decreased Strength  2. Decreased Transfer Abilities  3. Decreased Ambulation Ability/Technique  4. Decreased Balance  5. Increased Pain  6. Decreased Flexibility/Joint Mobility   INTERVENTIONS PLANNED: (Benefits and precautions of physical therapy have been discussed with the patient.)  1. Bed Mobility  2. Gait Training  3. Home Exercise Program (HEP)  4. Therapeutic Exercise/Strengthening  5. Transfer Training  6. Range of Motion: active/assisted/passive  7. Therapeutic Activities  8. Group Therapy     TREATMENT PLAN: Frequency/Duration: Follow patient BID for duration of hospital stay to address above goals. Rehabilitation Potential For Stated Goals: Good     RECOMMENDED REHABILITATION/EQUIPMENT: (at time of discharge pending progress): Rehab. HISTORY:   History of Present Injury/Illness (Reason for Referral):  Pt is status post left total hip replacement. Past Medical History/Comorbidities:   Ms. Diana Joseph  has a past medical history of Anemia; HTN (hypertension), benign; Hypercholesteremia; and Migraine. Ms. Diana Joseph  has a past surgical history that includes hx hysterectomy; hx refractive surgery; hx back surgery; hx orthopaedic; hx tonsillectomy; endoscopy, colon, diagnostic (10/3/12); and hx appendectomy. Social History/Living Environment:   Home Environment: Private residence  # Steps to Enter: 2  One/Two Story Residence: One story  Living Alone: No  Support Systems: Family member(s)  Patient Expects to be Discharged to[de-identified] Private residence  Current DME Used/Available at Home: None  Prior Level of Function/Work/Activity:  Independent with functional mobility.    Number of Personal Factors/Comorbidities that affect the Plan of Care: 0: LOW COMPLEXITY   EXAMINATION:   Most Recent Physical Functioning:                 LLE AROM  L Hip Flexion: 90  L Hip ABduction: 10  L Knee Extension: 0               Transfers  Sit to Stand: Contact guard assistance  Stand to Sit: Contact guard assistance    Balance  Sitting: Intact  Standing: With support              Weight Bearing Status  Left Side Weight Bearing: As tolerated  Distance (ft): 206 Feet (ft)  Ambulation - Level of Assistance: Contact guard assistance  Assistive Device: Walker, rolling  Speed/Rashmi: Delayed  Step Length: Left shortened;Right shortened  Stance: Left decreased  Gait Abnormalities: Antalgic;Decreased step clearance  Interventions: Safety awareness training;Verbal cues     Braces/Orthotics: none    Left Hip Cold  Type: Cold/ice pack      Body Structures Involved:  1. Bones  2. Joints  3. Muscles  4. Ligaments Body Functions Affected:  1. Neuromusculoskeletal  2. Movement Related Activities and Participation Affected:  1. Mobility   Number of elements that affect the Plan of Care: 4+: HIGH COMPLEXITY   CLINICAL PRESENTATION:   Presentation: Stable and uncomplicated: LOW COMPLEXITY   CLINICAL DECISION MAKIN[address] [place] [id number] AM-PAC 6 Clicks   Basic Mobility Inpatient Short Form  How much difficulty does the patient currently have. .. Unable A Lot A Little None   1. Turning over in bed (including adjusting bedclothes, sheets and blankets)? [] 1   [] 2   [x] 3   [] 4   2. Sitting down on and standing up from a chair with arms ( e.g., wheelchair, bedside commode, etc.)   [] 1   [] 2   [x] 3   [] 4   3. Moving from lying on back to sitting on the side of the bed? [] 1   [] 2   [x] 3   [] 4   How much help from another person does the patient currently need. .. Total A Lot A Little None   4. Moving to and from a bed to a chair (including a wheelchair)? [] 1   [] 2   [x] 3   [] 4   5. Need to walk in hospital room? [] 1   [] 2   [x] 3   [] 4   6. Climbing 3-5 steps with a railing? [] 1   [] 2   [x] 3   [] 4   © , Trustees of [address] [place] [id number], under license to [employer].  All rights reserved        Score:  Initial: 18 Most Recent: X (Date: -- )    Interpretation of Tool:  Represents activities that are increasingly more difficult (i.e. Bed mobility, Transfers, Gait). Score 24 23 22-20 19-15 14-10 9-7 6     Modifier CH CI CJ CK CL CM CN      ? Mobility - Walking and Moving Around:     - CURRENT STATUS: CK - 40%-59% impaired, limited or restricted    - GOAL STATUS: CK - 40%-59% impaired, limited or restricted    - D/C STATUS:  CK - 40%-59% impaired, limited or restricted  [place] / Product Type: Medicare /      Medical Necessity:     · Skilled intervention continues to be required due to decreased mobility ability. Reason for Services/Other Comments:  · Patient continues to require skilled intervention due to decreased mobility ability. Use of outcome tool(s) and clinical judgement create a POC that gives a: Clear prediction of patient's progress: LOW COMPLEXITY            TREATMENT:   (In addition to Assessment/Re-Assessment sessions the following treatments were rendered)     Pre-treatment Symptoms/Complaints:  Hip sore  Pain: Initial:      Post Session:3     Gait Training (15 Minutes):  Gait training to improve and/or restore physical functioning as related to mobility, strength and balance. Ambulated 206 Feet (ft) with Contact guard assistance using a Walker, rolling and minimal Safety awareness training;Verbal cues related to their stance phase to promote proper body alignment, promote proper body posture and promote proper body mechanics. .  Therapeutic Exercise: (30 Minutes):  Exercises per grid below to improve mobility and strength. Required minimal visual, verbal and manual cues to promote proper body alignment, promote proper body posture and promote proper body mechanics. Progressed range and repetitions as indicated.          Date:  8/7 Date:  8/8 Date:  8/9   ACTIVITY/EXERCISE AM PM AM PM AM PM   GROUP THERAPY  []  []  []  [x]  [x]  []   Ankle Pumps 5  15a 15a 20a    Quad Sets 10  10a 15a 20a    Gluteal Sets 10  10a 15a 20a    Hip ABd/ADduction 5  10aa 15a 20a    Straight Leg Raises         Knee Slides 10  10aa 15a 20a    Short Arc Quads    15a 20a    Long Arc Quads    15a 20a    Chair Slides                  B = bilateral; AA = active assistive; A = active; P = passive      Treatment/Session Assessment:     Response to Treatment:  Pt. Making progress, some difficulty with hip abduction    Education:  [x] Home Exercises  [x] Fall Precautions  [x] Hip Precautions [x] D/C Instruction Review  [x] Knee/Hip Prosthesis Review  [x] Walker Management/Safety [] Adaptive Equipment as Needed       Interdisciplinary Collaboration:   o Physical Therapist  o Rehabilitation Attendant    After treatment position/precautions:   o Up in chair  o Bed/Chair-wheels locked  o Bed in low position  o Call light within reach    Compliance with Program/Exercises: compliant most of the time. Recommendations/Intent for next treatment session:  Treatment next visit will focus on increasing Ms. Dye's independence with bed mobility, transfers, gait training, strength/ROM exercises, modalities for pain, and patient education.       Total Treatment Duration:  PT Patient Time In/Time Out  Time In: 1030  Time Out: 10 Shannon Jules, PT

## 2018-08-09 NOTE — PROGRESS NOTES
In recliner. No further complaints of pain or nausea. Remains in recliner. Family member in room. NV checks remain WDL. No further changes.

## 2018-08-09 NOTE — PROGRESS NOTES
Problem: Mobility Impaired (Adult and Pediatric)  Goal: *Acute Goals and Plan of Care (Insert Text)  GOALS (1-4 days):  (1.)Ms. Jeri Whiteside will move from supine to sit and sit to supine  in bed with INDEPENDENT. (2.)Ms. Jeri Whiteside will transfer from bed to chair and chair to bed with INDEPENDENT using the least restrictive device. (3.)Ms. Jeri Whiteside will ambulate with INDEPENDENT for 250 feet with the least restrictive device. (4.)Ms. Jeri Whiteside will ambulate up/down 3 steps with bilateral  railing with MINIMAL ASSIST with no device. (5.)Ms. Jeri Whiteside will state/observe SILVIANO precautions with 0 verbal cues. ________________________________________________________________________________________________    Outcome: Progressing Towards Goal    PHYSICAL THERAPY [facility]: Daily Note, PM 8/9/2018  INPATIENT: Hospital Day: 3  [place] / Product Type: Medicare /      NAME/AGE/GENDER: Reina Jimenez is a 80 y.o. female   PRIMARY DIAGNOSIS:  UNILATERAL PRIMARY OSTEOARTHRITIS, LEFT HIP   Procedure(s) and Anesthesia Type:     * LEFT HIP ARTHROPLASTY TOTAL - Spinal (Left)  ICD-10: Treatment Diagnosis:    · Pain in left hip (M25.552)  · Stiffness of Left Hip, Not elsewhere classified (M25.652)  · Difficulty in walking, Not elsewhere classified (R26.2)  · Other abnormalities of gait and mobility (R26.89)      ASSESSMENT:     Ms. Jeri Whiteside presents status post left total hip replacement with decreased independence with functional mobility. Therapy will maximize independence with functional mobility. Pt. Doing ok. Only complaint is some nausea. She ambulated again with walker and did silviano exercises with cues. Has some pain with hip abduction. Plans on rehab tomorrow. This section established at most recent assessment   PROBLEM LIST (Impairments causing functional limitations):  1. Decreased Strength  2. Decreased Transfer Abilities  3.  Decreased Ambulation Ability/Technique  4. Decreased Balance  5. Increased Pain  6. Decreased Flexibility/Joint Mobility   INTERVENTIONS PLANNED: (Benefits and precautions of physical therapy have been discussed with the patient.)  1. Bed Mobility  2. Gait Training  3. Home Exercise Program (HEP)  4. Therapeutic Exercise/Strengthening  5. Transfer Training  6. Range of Motion: active/assisted/passive  7. Therapeutic Activities  8. Group Therapy     TREATMENT PLAN: Frequency/Duration: Follow patient BID for duration of hospital stay to address above goals. Rehabilitation Potential For Stated Goals: Good     RECOMMENDED REHABILITATION/EQUIPMENT: (at time of discharge pending progress): Rehab. HISTORY:   History of Present Injury/Illness (Reason for Referral):  Pt is status post left total hip replacement. Past Medical History/Comorbidities:   Ms. Kei Drake  has a past medical history of Anemia; HTN (hypertension), benign; Hypercholesteremia; and Migraine. Ms. Kei Drake  has a past surgical history that includes hx hysterectomy; hx refractive surgery; hx back surgery; hx orthopaedic; hx tonsillectomy; endoscopy, colon, diagnostic (10/3/12); and hx appendectomy. Social History/Living Environment:   Home Environment: Private residence  # Steps to Enter: 2  One/Two Story Residence: One story  Living Alone: No  Support Systems: Family member(s)  Patient Expects to be Discharged to[de-identified] Private residence  Current DME Used/Available at Home: None  Prior Level of Function/Work/Activity:  Independent with functional mobility.    Number of Personal Factors/Comorbidities that affect the Plan of Care: 0: LOW COMPLEXITY   EXAMINATION:   Most Recent Physical Functioning:                 LLE AROM  L Hip Flexion: 90  L Hip ABduction: 10  L Knee Extension: 0               Transfers  Sit to Stand: Contact guard assistance  Stand to Sit: Contact guard assistance    Balance  Sitting: Intact  Standing: With support              Weight Bearing Status  Left Side Weight Bearing: As tolerated  Distance (ft): 206 Feet (ft)  Ambulation - Level of Assistance: Contact guard assistance  Assistive Device: Walker, rolling  Speed/Rashmi: Delayed  Step Length: Left shortened;Right shortened  Stance: Left decreased  Gait Abnormalities: Antalgic;Decreased step clearance  Interventions: Safety awareness training;Verbal cues     Braces/Orthotics: none    Left Hip Cold  Type: Cold/ice pack      Body Structures Involved:  1. Bones  2. Joints  3. Muscles  4. Ligaments Body Functions Affected:  1. Neuromusculoskeletal  2. Movement Related Activities and Participation Affected:  1. Mobility   Number of elements that affect the Plan of Care: 4+: HIGH COMPLEXITY   CLINICAL PRESENTATION:   Presentation: Stable and uncomplicated: LOW COMPLEXITY   CLINICAL DECISION MAKIN[address] [place] [id number] AM-PAC 6 Clicks   Basic Mobility Inpatient Short Form  How much difficulty does the patient currently have. .. Unable A Lot A Little None   1. Turning over in bed (including adjusting bedclothes, sheets and blankets)? [] 1   [] 2   [x] 3   [] 4   2. Sitting down on and standing up from a chair with arms ( e.g., wheelchair, bedside commode, etc.)   [] 1   [] 2   [x] 3   [] 4   3. Moving from lying on back to sitting on the side of the bed? [] 1   [] 2   [x] 3   [] 4   How much help from another person does the patient currently need. .. Total A Lot A Little None   4. Moving to and from a bed to a chair (including a wheelchair)? [] 1   [] 2   [x] 3   [] 4   5. Need to walk in hospital room? [] 1   [] 2   [x] 3   [] 4   6. Climbing 3-5 steps with a railing? [] 1   [] 2   [x] 3   [] 4   © , Trustees of [address] [place] [address], under license to [email or web address]. All rights reserved        Score:  Initial: 18 Most Recent: X (Date: -- )    Interpretation of Tool:  Represents activities that are increasingly more difficult (i.e. Bed mobility, Transfers, Gait).    Score 24 23 22-20 19-15 14-10 9-7 6     Modifier CH CI CJ CK CL CM CN      ? Mobility - Walking and Moving Around:     - CURRENT STATUS: CK - 40%-59% impaired, limited or restricted    - GOAL STATUS: CK - 40%-59% impaired, limited or restricted    - D/C STATUS:  CK - 40%-59% impaired, limited or restricted  [place] / Product Type: Medicare /      Medical Necessity:     · Skilled intervention continues to be required due to decreased mobility ability. Reason for Services/Other Comments:  · Patient continues to require skilled intervention due to decreased mobility ability. Use of outcome tool(s) and clinical judgement create a POC that gives a: Clear prediction of patient's progress: LOW COMPLEXITY            TREATMENT:   (In addition to Assessment/Re-Assessment sessions the following treatments were rendered)     Pre-treatment Symptoms/Complaints:  Hip sore  Pain: Initial:      Post Session:3     Gait Training (10 Minutes):  Gait training to improve and/or restore physical functioning as related to mobility, strength and balance. Ambulated 206 Feet (ft) with Contact guard assistance using a Walker, rolling and minimal Safety awareness training;Verbal cues related to their stance phase to promote proper body alignment, promote proper body posture and promote proper body mechanics. .  Therapeutic Exercise: (15 Minutes):  Exercises per grid below to improve mobility and strength. Required minimal visual, verbal and manual cues to promote proper body alignment, promote proper body posture and promote proper body mechanics. Progressed range and repetitions as indicated.          Date:  8/7 Date:  8/8 Date:  8/9   ACTIVITY/EXERCISE AM PM AM PM AM PM   GROUP THERAPY  []  []  []  [x]  [x]  []   Ankle Pumps 5  15a 15a 20a 20a   Quad Sets 10  10a 15a 20a 20a   Gluteal Sets 10  10a 15a 20a 20a   Hip ABd/ADduction 5  10aa 15a 20a 20a   Straight Leg Raises         Knee Slides 10  10aa 15a 20a 20a   Short Arc Quads    15a 20a 20a   Long Arc Quads    15a [id number]   Chair Slides                  B = bilateral; AA = active assistive; A = active; P = passive      Treatment/Session Assessment:     Response to Treatment:  Pt. Doing fine    Education:  [x] Home Exercises  [x] Fall Precautions  [x] Hip Precautions [x] D/C Instruction Review  [x] Knee/Hip Prosthesis Review  [x] Walker Management/Safety [] Adaptive Equipment as Needed       Interdisciplinary Collaboration:   o Physical Therapist  o Rehabilitation Attendant    After treatment position/precautions:   o Up in chair  o Bed/Chair-wheels locked  o Bed in low position  o Caregiver at bedside  o Call light within reach  o Family at bedside    Compliance with Program/Exercises: compliant most of the time. Recommendations/Intent for next treatment session:  Treatment next visit will focus on increasing Ms. Dye's independence with bed mobility, transfers, gait training, strength/ROM exercises, modalities for pain, and patient education.       Total Treatment Duration:  PT Patient Time In/Time Out  Time In: 1345  Time Out: [address], PT

## 2018-08-09 NOTE — PROGRESS NOTES
Problem: Self Care Deficits Care Plan (Adult)  Goal: *Acute Goals and Plan of Care (Insert Text)  GOALS:   DISCHARGE GOALS (in preparation for going home/rehab):  3 days  1. Ms. Lynda Chahal will perform one lower body dressing activity with minimal assistance with adaptive equipment to demonstrate improved functional mobility and safety. GOAL MET 8/9/2018    2. Ms. Lynda Chahal will perform one lower body bathing activity with minimal  assistance with adaptive equipment to demonstrate improved functional mobility and safety. GOAL MET 8/9/2018  3. Ms. Lynda Chahal will perform toileting/toilet transfer with contact guard assistance with adaptive equipment to demonstrate improved functional mobility and safety. GOAL MET 8/9/2018    4. Ms. Lynda Chahal will perform shower transfer with contact guard assistance with adaptive equipment to demonstrate improved functional mobility and safety. GOAL MET 8/9/2018    5. Ms. Lynda Chahal will state JANE precautions with two verbal cues to demonstrate improved functional mobility and safety. GOAL MET 8/9/2018    New goals:  6.  Ms. Lynda Chahal will perform one lower body dressing activity with CGA with adaptive equipment to demonstrate improved functional mobility and safety  7. Ms. Lynda Chahal will perform one lower body bathing activity with minimal  Assistance/CGA  with adaptive equipment to demonstrate improved functional mobility and safety    JOINT CAMP OCCUPATIONAL THERAPY JANE: Daily Note and AM 8/9/2018  INPATIENT: Hospital Day: 3  [place] / [place] / Product Type: Medicare /      NAME/AGE/GENDER: Luis Armando Boo is a 80 y.o. female   PRIMARY DIAGNOSIS:  UNILATERAL PRIMARY OSTEOARTHRITIS, LEFT HIP   Procedure(s) and Anesthesia Type:     * LEFT HIP ARTHROPLASTY TOTAL - Spinal (Left)  ICD-10: Treatment Diagnosis:    · Pain in left hip (M25.552)  · Stiffness of Left Hip, Not elsewhere classified (V03.849)      ASSESSMENT:      Ms. Lynda Chahal is s/p left JANE and presents with decreased weight bearing on left LE and decreased independence with functional mobility and activities of daily living. Patient completed shower and dressing as charter below in ADL grid and is ambulating with rolling walker and min assist to [id number] assist.  Patient has met 3/5 goals and plans to go to [facility] rehab facility for further therapy services OT reviewed hip precautions throughout session and issued long handled sponge for home use. Patient instructed to call for assistance when needing to get up from recliner and all needs in reach. Patient verbalized understanding of call light.    8/9/18 patient see for ADL session to include shower. She transferred with CGA to SBA with RW and reported she has much less pain than yesterday. She completed shower with SBA and then became nauseous. She returned to recliner. BP taken 139/69 HR 61 O2 sat 98. Nursing aware. Patient in gown reclined in recliner. CNA or OT to assist with dressing at another time. Goals met for transfers. Ot to see patient tomorrow for ADL session. New goals added. This section established at most recent assessment   PROBLEM LIST (Impairments causing functional limitations):  1. Decreased Strength  2. Decreased ADL/Functional Activities  3. Decreased Transfer Abilities  4. Increased Pain  5. Increased Fatigue  6. Decreased Flexibility/Joint Mobility  7. Decreased Knowledge of Precautions   INTERVENTIONS PLANNED: (Benefits and precautions of occupational therapy have been discussed with the patient.)  1. Activities of daily living training  2. Adaptive equipment training  3. Balance training  4. Clothing management  5. Donning&doffing training  6. Theraputic activity     TREATMENT PLAN: Frequency/Duration: Follow patient 1-2tx to address above goals. Rehabilitation Potential For Stated Goals: Excellent     RECOMMENDED REHABILITATION/EQUIPMENT: (at time of discharge pending progress): Home Health: Physical Therapy. OCCUPATIONAL PROFILE AND HISTORY:   History of Present Injury/Illness (Reason for Referral): Pt presents this date s/p (Left) JANE. Past Medical History/Comorbidities:   Ms. Sandra Olmedo  has a past medical history of Anemia; HTN (hypertension), benign; Hypercholesteremia; and Migraine. Ms. Sandra Olmedo  has a past surgical history that includes hx hysterectomy; hx refractive surgery; hx back surgery; hx orthopaedic; hx tonsillectomy; endoscopy, colon, diagnostic (10/3/12); and hx appendectomy. Social History/Living Environment:   Home Environment: Private residence  # Steps to Enter: 2  One/Two Story Residence: One story  Living Alone: No  Support Systems: Family member(s)  Patient Expects to be Discharged to[de-identified] Private residence  Current DME Used/Available at Home: None  Prior Level of Function/Work/Activity:  Independent prior. Number of Personal Factors/Comorbidities that affect the Plan of Care: Brief history (0):  LOW COMPLEXITY   ASSESSMENT OF OCCUPATIONAL PERFORMANCE[de-identified]   Most Recent Physical Functioning:   Balance  Sitting: Intact  Standing: With support; Without support                              Mental Status  Neurologic State: Alert; Appropriate for age  Orientation Level: Appropriate for age  Cognition: Appropriate decision making; Appropriate for age attention/concentration; Appropriate safety awareness; Follows commands  Perception: Appears intact  Perseveration: No perseveration noted  Safety/Judgement: Awareness of environment; Fall prevention                Basic ADLs (From Assessment) Complex ADLs (From Assessment)   Basic ADL  Feeding: Independent  Oral Facial Hygiene/Grooming: Setup  Bathing: Minimum assistance  Type of Bath: Chlorhexidine (CHG), Shower  Upper Body Dressing: Setup  Lower Body Dressing: Total assistance  Toileting:  Moderate assistance     Grooming/Bathing/Dressing Activities of Daily Living   Grooming  Grooming Assistance: Supervision/set up  Washing Face: Supervision/set-up  Washing Hands: Supervision/set-up Cognitive Retraining  Safety/Judgement: Awareness of environment; Fall prevention   Upper Body Bathing  Bathing Assistance: Supervision/set-up  Position Performed: Seated in chair  Adaptive Equipment: Grab bar; Shower chair     Lower Body Bathing  Bathing Assistance: Minimum assistance  Perineal  : Stand-by assistance;Contact guard assistance  Position Performed: Seated in chair;Standing  Adaptive Equipment: Grab bar;Long handled sponge;Walker  Lower Body : Minimum assistance  Position Performed: Seated in chair;Standing  Adaptive Equipment: Grab bar;Long handled sponge; Shower chair Toileting  Toileting Assistance: Stand-by assistance;Contact guard assistance  Bladder Hygiene: Stand-by assistance;Contact guard assistance  Clothing Management: Contact guard assistance  Adaptive Equipment: Walker   Upper Body Dressing Assistance  Dressing Assistance: Supervision/set-up  Bra: Supervision/set-up  Hospital Gown: Supervision/ set-up  Pullover Shirt: Supervision/set-up Functional Transfers  Bathroom Mobility: Stand-by assistance;Contact guard assistance  Toilet Transfer : Stand-by assistance;Contact guard assistance  Shower Transfer: Stand-by assistance;Contact guard assistance  Adaptive Equipment: Bedside commode;Grab bars; Shower chair with back   Lower Body Dressing Assistance  Dressing Assistance: Stand-by assistance;Contact guard assistance  Underpants: Stand-by assistance;Contact guard assistance  Protective Undergarmet: Stand-by assistance;Contact guard assistance Bed/Mat Mobility  Sit to Stand: Stand-by assistance;Contact guard assistance         Physical Skills Involved:  1. Range of Motion  2. Balance  3. Strength Cognitive Skills Affected (resulting in the inability to perform in a timely and safe manner):  1.  NT Psychosocial Skills Affected:  1. nt   Number of elements that affect the Plan of Care: 1-3:  LOW COMPLEXITY   CLINICAL DECISION MAKING:   [facility] 6 Clicks   Daily Activity Inpatient Short Form  How much help from another person does the patient currently need. .. Total A Lot A Little None   1. Putting on and taking off regular lower body clothing? [x] 1   [] 2   [] 3   [] 4   2. Bathing (including washing, rinsing, drying)? [] 1   [x] 2   [] 3   [] 4   3. Toileting, which includes using toilet, bedpan or urinal?   [] 1   [x] 2   [] 3   [] 4   4. Putting on and taking off regular upper body clothing? [] 1   [] 2   [] 3   [x] 4   5. Taking care of personal grooming such as brushing teeth? [] 1   [] 2   [] 3   [x] 4   6. Eating meals? [] 1   [] 2   [] 3   [x] 4   © 2007, Trustees of [address] [place] [address], under license to [employer]. All rights reserved     Score:  Initial: 17 Most Recent: X (Date: -- )    Interpretation of Tool:  Represents activities that are increasingly more difficult (i.e. Bed mobility, Transfers, Gait). Score 24 23 22-20 19-15 14-10 9-7 6     Modifier CH CI CJ CK CL CM CN      ? Self Care:     - CURRENT STATUS: CK - 40%-59% impaired, limited or restricted    - GOAL STATUS: CJ - 20%-39% impaired, limited or restricted    - D/C STATUS:  ---------------To be determined---------------  [place] / Product Type: Medicare /      Medical Necessity:     · Skilled intervention continues to be required due to Deficits noted above. Reason for Services/Other Comments:  · Patient continues to require skilled intervention due to new JANE.    Use of outcome tool(s) and clinical judgement create a POC that gives a: MODERATE COMPLEXITY            TREATMENT:   (In addition to Assessment/Re-Assessment sessions the following treatments were rendered)     Pre-treatment Symptoms/Complaints:    Pain: Initial:   Pain Intensity 1: 0 shower Post Session:  0/10 at rest     Self Care: (27): Procedure(s) (per grid) utilized to improve and/or restore self-care/home management as related to dressing, bathing, toileting and grooming. Required minimal visual, verbal and tactile cueing to facilitate activities of daily living skills, compensatory activities and hip   kit training. Treatment/Session Assessment:     Response to Treatment:  Not feeling well at end of shower. REsting in recliner, nursing aware, OT or CNA to assist patient with dressing later this am.    Education:  [] Home Exercises  [x] Fall Precautions  [x] Hip Precautions [] Going Home Video  [] Knee/Hip Prosthesis Review  [x] Walker Management/Safety [x] Adaptive Equipment as Needed       Interdisciplinary Collaboration:   o Occupational Therapist  o Registered Nurse  o Certified Nursing Assistant/Patient Care Technician    After treatment position/precautions:   o Up in chair  o Bed alarm/tab alert on  o Bed/Chair-wheels locked  o Bed in low position  o Call light within reach  o RN notified     Compliance with Program/Exercises: compliant all of the time. Recommendations/Intent for next treatment session:  Treatment next visit will focus on increasing Ms. Dye's independence with bed mobility, transfers, self care, functional mobility, modalities for pain, and patient education.       Total Treatment Duration:27  OT Patient Time In/Time Out  Time In: 7478  Time Out: [address] Jonas Adams

## 2018-08-09 NOTE — PROGRESS NOTES
2018         Post Op day: 2 Days Post-Op   Admit Diagnosis: Unilateral primary osteoarthritis, left hip [M16.12]  LAB:    Recent Results (from the past 24 hour(s))   HEMOGLOBIN    Collection Time: 18  4:23 AM   Result Value Ref Range    HGB 10.0 (L) 11.7 - 15.4 g/dL     Vital Signs:    Patient Vitals for the past 8 hrs:   BP Temp Pulse Resp SpO2   18 0439 148/73 97.7 °F (36.5 °C) 62 16 97 %   18 2332 117/72 97.4 °F (36.3 °C) (!) 56 16 94 %     Temp (24hrs), Av °F (36.7 °C), Min:97.4 °F (36.3 °C), Max:98.6 °F (37 °C)    Pain Control:   Pain Assessment  Pain Scale 1: Visual  Pain Intensity 1: 5  Pain Onset 1: at rest  Pain Location 1: Hip  Pain Orientation 1: Left  Pain Description 1: Constant  Pain Intervention(s) 1: Medication (see MAR)  Subjective: Doing well, pain is well controlled, no complaints     Objective:  No Acute Distress, Alert and Oriented, left hip dressing is C/D/I. Posterior thigh and calves are soft, NT. Neurovascular exam is normal       Assessment:   Patient Active Problem List   Diagnosis Code    Anemia D64.9    HTN (hypertension), benign I10    Hypercholesteremia E78.00    Migraine G43.909    Insomnia G47.00    Depression F32.9    Abnormal EKG H21.33    Systolic murmur J38.8    Essential hypertension with goal blood pressure less than 130/85 I10    Mixed hyperlipidemia E78.2    Arthritis of left hip M16.12       Status Post Procedure(s) (LRB):  LEFT HIP ARTHROPLASTY TOTAL (Left)        Plan: Continue Physical Therapy, Monitor Hgb. ASA/SCDs for DVT prophylaxis. Awaiting rehab placement.   Pt was seen by Dr. Robert Ace this AM.   Signed By: CEDRICK Castillo

## 2018-08-09 NOTE — PROGRESS NOTES
Patient woke up and stated \"I'm scared. Where am I? \"  RN reoriented patient, assisted patient to bathroom and back to recliner. Patient did not want to be left in room alone. RN stayed in room with patient for reassurance until patient felt comfortable being left alone. Will monitor.

## 2018-08-10 VITALS
HEART RATE: 65 BPM | BODY MASS INDEX: 28.93 KG/M2 | HEIGHT: 66 IN | OXYGEN SATURATION: 97 % | DIASTOLIC BLOOD PRESSURE: 63 MMHG | WEIGHT: 180 LBS | RESPIRATION RATE: 20 BRPM | SYSTOLIC BLOOD PRESSURE: 129 MMHG | TEMPERATURE: 98.2 F

## 2018-08-10 LAB
MM INDURATION POC: NORMAL MM (ref 0–5)
PPD POC: 0 NEGATIVE

## 2018-08-10 PROCEDURE — 97535 SELF CARE MNGMENT TRAINING: CPT

## 2018-08-10 PROCEDURE — 74011250637 HC RX REV CODE- 250/637: Performed by: ORTHOPAEDIC SURGERY

## 2018-08-10 PROCEDURE — 74011250637 HC RX REV CODE- 250/637: Performed by: PHYSICIAN ASSISTANT

## 2018-08-10 PROCEDURE — 97116 GAIT TRAINING THERAPY: CPT

## 2018-08-10 PROCEDURE — 97150 GROUP THERAPEUTIC PROCEDURES: CPT

## 2018-08-10 RX ADMIN — HYDROMORPHONE HYDROCHLORIDE 2 MG: 2 TABLET ORAL at 00:05

## 2018-08-10 RX ADMIN — HYDROMORPHONE HYDROCHLORIDE 2 MG: 2 TABLET ORAL at 07:34

## 2018-08-10 RX ADMIN — HYDROMORPHONE HYDROCHLORIDE 2 MG: 2 TABLET ORAL at 11:40

## 2018-08-10 RX ADMIN — SENNOSIDES AND DOCUSATE SODIUM 2 TABLET: 8.6; 5 TABLET ORAL at 07:35

## 2018-08-10 RX ADMIN — ACETAMINOPHEN 1000 MG: 500 TABLET, FILM COATED ORAL at 11:40

## 2018-08-10 RX ADMIN — ASPIRIN 81 MG: 81 TABLET, DELAYED RELEASE ORAL at 07:34

## 2018-08-10 RX ADMIN — CARVEDILOL 6.25 MG: 6.25 TABLET, FILM COATED ORAL at 07:34

## 2018-08-10 NOTE — PROGRESS NOTES
August 10, 2018         Post Op day: 3 Days Post-Op   Admit Diagnosis: Unilateral primary osteoarthritis, left hip [M16.12]  LAB:    Recent Results (from the past 24 hour(s))   PLEASE READ & DOCUMENT PPD TEST IN 48 HRS    Collection Time: 18  9:53 AM   Result Value Ref Range    PPD 0 Negative    mm Induration  mm     Vital Signs:    Patient Vitals for the past 8 hrs:   BP Temp Pulse Resp SpO2   08/10/18 0700 120/58 97.7 °F (36.5 °C) 68 16 95 %   08/10/18 0404 120/81 97.9 °F (36.6 °C) 65 16 95 %     Temp (24hrs), Av.7 °F (36.5 °C), Min:97.5 °F (36.4 °C), Max:97.9 °F (36.6 °C)    Pain Control:   Pain Assessment  Pain Scale 1: Numeric (0 - 10)  Pain Intensity 1: 2  Pain Onset 1: at rest  Pain Location 1: Hip  Pain Orientation 1: Left  Pain Description 1: Aching  Pain Intervention(s) 1: Medication (see MAR)  Subjective: Doing well, no complaints     Objective:  No Acute Distress, Alert and Oriented, Neurovascular exam is normal       Assessment:   Patient Active Problem List   Diagnosis Code    Anemia D64.9    HTN (hypertension), benign I10    Hypercholesteremia E78.00    Migraine G43.909    Insomnia G47.00    Depression F32.9    Abnormal EKG T65.75    Systolic murmur E44.7    Essential hypertension with goal blood pressure less than 130/85 I10    Mixed hyperlipidemia E78.2    Arthritis of left hip M16.12       Status Post Procedure(s) (LRB):  LEFT HIP ARTHROPLASTY TOTAL (Left)        Plan: Continue Physical Therapy, Monitor Hgb. Home today.    Signed By: Tesha Govea MD

## 2018-08-10 NOTE — PROGRESS NOTES
TRANSFER - OUT REPORT:    Verbal report given to Alayna Munroe on Sarah Pert  being transferred to [facility]  for routine post - op       Report consisted of patients Situation, Background, Assessment and   Recommendations(SBAR). Information from the following report(s) SBAR was reviewed with the receiving nurse. Lines:       Opportunity for questions and clarification was provided.       Patient transported with:

## 2018-08-10 NOTE — PROGRESS NOTES
Sitting up in recliner,dsng dry and intact. Neurovascular status remains WDL. Call light within reach.

## 2018-08-10 NOTE — PROGRESS NOTES
Problem: Mobility Impaired (Adult and Pediatric)  Goal: *Acute Goals and Plan of Care (Insert Text)  GOALS (1-4 days):  (1.)Ms. Zach Mckeon will move from supine to sit and sit to supine  in bed with INDEPENDENT. (2.)Ms. Zach Mckeon will transfer from bed to chair and chair to bed with INDEPENDENT using the least restrictive device. (3.)Ms. Zach Mckeon will ambulate with INDEPENDENT for 250 feet with the least restrictive device. (4.)Ms. Zach Mckeon will ambulate up/down 3 steps with bilateral  railing with MINIMAL ASSIST with no device. (5.)Ms. Zach Mckeon will state/observe JANE precautions with 0 verbal cues. Goal met  ________________________________________________________________________________________________    Outcome: Progressing Towards Goal    PHYSICAL THERAPY [facility] [place]: Daily Note, AM 8/10/2018  INPATIENT: Hospital Day: 4  [place] / Product Type: Medicare /      NAME/AGE/GENDER: Tiarra Thomson is a 80 y.o. female   PRIMARY DIAGNOSIS:  UNILATERAL PRIMARY OSTEOARTHRITIS, LEFT HIP   Procedure(s) and Anesthesia Type:     * LEFT HIP ARTHROPLASTY TOTAL - Spinal (Left)  ICD-10: Treatment Diagnosis:    · Pain in left hip (M25.552)  · Stiffness of Left Hip, Not elsewhere classified (M25.652)  · Difficulty in walking, Not elsewhere classified (R26.2)  · Other abnormalities of gait and mobility (R26.89)      ASSESSMENT:     Ms. Zach Mckeon presents status post left total hip replacement with decreased independence with functional mobility. Therapy will maximize independence with functional mobility. Pt. Doing well today with no complaints. She continues to do well with gait with walker and jane exercises. Reviewed precautions. She still has some pain with hip abduction. Plans on rehab today with no other questions. This section established at most recent assessment   PROBLEM LIST (Impairments causing functional limitations):  1.  Decreased Strength  2. Decreased Transfer Abilities  3. Decreased Ambulation Ability/Technique  4. Decreased Balance  5. Increased Pain  6. Decreased Flexibility/Joint Mobility   INTERVENTIONS PLANNED: (Benefits and precautions of physical therapy have been discussed with the patient.)  1. Bed Mobility  2. Gait Training  3. Home Exercise Program (HEP)  4. Therapeutic Exercise/Strengthening  5. Transfer Training  6. Range of Motion: active/assisted/passive  7. Therapeutic Activities  8. Group Therapy     TREATMENT PLAN: Frequency/Duration: Follow patient BID for duration of hospital stay to address above goals. Rehabilitation Potential For Stated Goals: Good     RECOMMENDED REHABILITATION/EQUIPMENT: (at time of discharge pending progress): Rehab. HISTORY:   History of Present Injury/Illness (Reason for Referral):  Pt is status post left total hip replacement. Past Medical History/Comorbidities:   Ms. Reyes Prasad  has a past medical history of Anemia; HTN (hypertension), benign; Hypercholesteremia; and Migraine. Ms. Reyes Prasad  has a past surgical history that includes hx hysterectomy; hx refractive surgery; hx back surgery; hx orthopaedic; hx tonsillectomy; endoscopy, colon, diagnostic (10/3/12); and hx appendectomy. Social History/Living Environment:   Home Environment: Private residence  # Steps to Enter: 2  One/Two Story Residence: One story  Living Alone: No  Support Systems: Family member(s)  Patient Expects to be Discharged to[de-identified] Private residence  Current DME Used/Available at Home: None  Prior Level of Function/Work/Activity:  Independent with functional mobility.    Number of Personal Factors/Comorbidities that affect the Plan of Care: 0: LOW COMPLEXITY   EXAMINATION:   Most Recent Physical Functioning:                 LLE AROM  L Hip Flexion: 90  L Hip ABduction: 10  L Knee Extension: 0               Transfers  Sit to Stand: Contact guard assistance;Stand-by assistance  Stand to Sit: Stand-by assistance;Contact guard assistance    Balance  Sitting: Intact  Standing: With support              Weight Bearing Status  Left Side Weight Bearing: As tolerated  Distance (ft): 206 Feet (ft)  Ambulation - Level of Assistance: Contact guard assistance;Stand-by assistance  Assistive Device: Walker, rolling  Speed/Rashmi: Delayed  Step Length: Left shortened;Right shortened  Stance: Left decreased  Gait Abnormalities: Antalgic;Decreased step clearance  Interventions: Safety awareness training;Verbal cues     Braces/Orthotics: none    Left Hip Cold  Type:  (declined)      Body Structures Involved:  1. Bones  2. Joints  3. Muscles  4. Ligaments Body Functions Affected:  1. Neuromusculoskeletal  2. Movement Related Activities and Participation Affected:  1. Mobility   Number of elements that affect the Plan of Care: 4+: HIGH COMPLEXITY   CLINICAL PRESENTATION:   Presentation: Stable and uncomplicated: LOW COMPLEXITY   CLINICAL DECISION MAKIN[address] [place] [id number] AM-PAC 6 Clicks   Basic Mobility Inpatient Short Form  How much difficulty does the patient currently have. .. Unable A Lot A Little None   1. Turning over in bed (including adjusting bedclothes, sheets and blankets)? [] 1   [] 2   [x] 3   [] 4   2. Sitting down on and standing up from a chair with arms ( e.g., wheelchair, bedside commode, etc.)   [] 1   [] 2   [x] 3   [] 4   3. Moving from lying on back to sitting on the side of the bed? [] 1   [] 2   [x] 3   [] 4   How much help from another person does the patient currently need. .. Total A Lot A Little None   4. Moving to and from a bed to a chair (including a wheelchair)? [] 1   [] 2   [x] 3   [] 4   5. Need to walk in hospital room? [] 1   [] 2   [x] 3   [] 4   6. Climbing 3-5 steps with a railing? [] 1   [] 2   [x] 3   [] 4   © , Trustees of [address] [place] [id number], under license to [employer].  All rights reserved        Score:  Initial: 18 Most Recent: X (Date: -- )    Interpretation of Tool:  Represents activities that are increasingly more difficult (i.e. Bed mobility, Transfers, Gait). Score 24 23 22-20 19-15 14-10 9-7 6     Modifier CH CI CJ CK CL CM CN      ? Mobility - Walking and Moving Around:     - CURRENT STATUS: CK - 40%-59% impaired, limited or restricted    - GOAL STATUS: CK - 40%-59% impaired, limited or restricted    - D/C STATUS:  CK - 40%-59% impaired, limited or restricted  [place] / Product Type: Medicare /      Medical Necessity:     · Skilled intervention continues to be required due to decreased mobility ability. Reason for Services/Other Comments:  · Patient continues to require skilled intervention due to decreased mobility ability. Use of outcome tool(s) and clinical judgement create a POC that gives a: Clear prediction of patient's progress: LOW COMPLEXITY            TREATMENT:   (In addition to Assessment/Re-Assessment sessions the following treatments were rendered)     Pre-treatment Symptoms/Complaints:  Hip sore  Pain: Initial:      Post Session:3 with gait     Gait Training (15 Minutes):  Gait training to improve and/or restore physical functioning as related to mobility, strength and balance. Ambulated 206 Feet (ft) with Contact guard assistance;Stand-by assistance using a Walker, rolling and minimal Safety awareness training;Verbal cues related to their stance phase to promote proper body alignment, promote proper body posture and promote proper body mechanics. .  Therapeutic Exercise: (30 Minutes):  Exercises per grid below to improve mobility and strength. Required minimal visual, verbal and manual cues to promote proper body alignment, promote proper body posture and promote proper body mechanics. Progressed range and repetitions as indicated.          Date:  8/7 Date:  8/8 Date:  8/9    ACTIVITY/EXERCISE AM PM AM PM AM PM 8/10am   GROUP THERAPY  []  []  []  [x]  [x]  [] group   Ankle Pumps 5 15a 15a 20a 20a 20a   Quad Sets 10  10a 15a 20a 20a 20a   Gluteal Sets 10  10a 15a 20a 20a 20a   Hip ABd/ADduction 5  10aa 15a 20a 20a 20a   Straight Leg Raises          Knee Slides 10  10aa 15a 20a 20a 20a   Short Arc Quads    15a 20a 20a 20a   Long Arc Quads    15a 20a 20a 20a   Chair Slides                    B = bilateral; AA = active assistive; A = active; P = passive      Treatment/Session Assessment:     Response to Treatment:  Pt. Has made good progress    Education:  [x] Home Exercises  [x] Fall Precautions  [x] Hip Precautions [x] D/C Instruction Review  [x] Knee/Hip Prosthesis Review  [x] Walker Management/Safety [] Adaptive Equipment as Needed       Interdisciplinary Collaboration:   o Physical Therapist  o Rehabilitation Attendant    After treatment position/precautions:   o Up in chair  o Bed/Chair-wheels locked  o Bed in low position  o Call light within reach    Compliance with Program/Exercises: compliant most of the time. Recommendations/Intent for next treatment session:  Treatment next visit will focus on increasing Ms. Dye's independence with bed mobility, transfers, gait training, strength/ROM exercises, modalities for pain, and patient education.       Total Treatment Duration:  PT Patient Time In/Time Out  Time In: 0930  Time Out: 85 Tomi Adams, PT

## 2018-08-10 NOTE — PROGRESS NOTES
Problem: Self Care Deficits Care Plan (Adult)  Goal: *Acute Goals and Plan of Care (Insert Text)  GOALS:   DISCHARGE GOALS (in preparation for going home/rehab):  3 days  1. Ms. Reyes Prasad will perform one lower body dressing activity with minimal assistance with adaptive equipment to demonstrate improved functional mobility and safety. GOAL MET 8/10/2018    2. Ms. Reyes Prasad will perform one lower body bathing activity with minimal  assistance with adaptive equipment to demonstrate improved functional mobility and safety. GOAL MET 8/10/2018  3. Ms. Reyes Prasad will perform toileting/toilet transfer with contact guard assistance with adaptive equipment to demonstrate improved functional mobility and safety. GOAL MET 8/10/2018    4. Ms. Reyes Prasad will perform shower transfer with contact guard assistance with adaptive equipment to demonstrate improved functional mobility and safety. GOAL MET 8/10/2018    5. Ms. Reyes Prasad will state JANE precautions with two verbal cues to demonstrate improved functional mobility and safety. GOAL MET 8/10/2018    New goals:  6.  Ms. Reyes Prasad will perform one lower body dressing activity with CGA with adaptive equipment to demonstrate improved functional mobility and safety. GOAL MET 8/10/2018      7. Ms. Reyes Prasad will perform one lower body bathing activity with minimal  Assistance/CGA  with adaptive equipment to demonstrate improved functional mobility and safety GOAL MET 8/10/2018      JOINT CAMP OCCUPATIONAL THERAPY JANE: Daily Note, Discharge and AM 8/10/2018  INPATIENT: Hospital Day: 4  [place] / Product Type: Medicare /      NAME/AGE/GENDER: Tye Sesay is a 80 y.o. female   PRIMARY DIAGNOSIS:  UNILATERAL PRIMARY OSTEOARTHRITIS, LEFT HIP   Procedure(s) and Anesthesia Type:     * LEFT HIP ARTHROPLASTY TOTAL - Spinal (Left)  ICD-10: Treatment Diagnosis:    · Pain in left hip (M25.552)  · Stiffness of Left Hip, Not elsewhere classified (U03.101)      ASSESSMENT:      Ms. Allie Small is s/p left JANE and presents with decreased weight bearing on left LE and decreased independence with functional mobility and activities of daily living. Patient completed shower and dressing as charter below in ADL grid and is ambulating with rolling walker and min assist to [address] assist.  Patient has met 3/5 goals and plans to go to [facility] rehab facility for further therapy services OT reviewed hip precautions throughout session and issued long handled sponge for home use. Patient instructed to call for assistance when needing to get up from recliner and all needs in reach. Patient verbalized understanding of call light.    8/9/18 patient see for ADL session to include shower. She transferred with CGA to SBA with RW and reported she has much less pain than yesterday. She completed shower with SBA and then became nauseous. She returned to recliner. BP taken 139/69 HR 61 O2 sat 98. Nursing aware. Patient in gown reclined in recliner. CNA or OT to assist with dressing at another time. Goals met for transfers. Ot to see patient tomorrow for ADL session. New goals added. 8/10/18 patient seen for ADL session to include shower. She ambulated with CGA to stand by assist with RW. She was CGA/SBA sit to stand from shower chair. She completed shower and dressing as documented below. SHe had no nausea today. She plans to go to [facility] rehab and she has met her OT goals. She will have continued therapy at [facility] for plan to return home. Will discharge OT Acute services. Patient up in recliner with all needs in reach. This section established at most recent assessment     1. RECOMMENDED REHABILITATION/EQUIPMENT: (at time of discharge pending progress): Rehab. OCCUPATIONAL PROFILE AND HISTORY:   History of Present Injury/Illness (Reason for Referral): Pt presents this date s/p (Left) JANE.     Past Medical History/Comorbidities:   Ms. Allie Small  has a past medical history of Anemia; HTN (hypertension), benign; Hypercholesteremia; and Migraine. Ms. Ralph Breewr  has a past surgical history that includes hx hysterectomy; hx refractive surgery; hx back surgery; hx orthopaedic; hx tonsillectomy; endoscopy, colon, diagnostic (10/3/12); and hx appendectomy. Social History/Living Environment:   Home Environment: Private residence  # Steps to Enter: 2  One/Two Story Residence: One story  Living Alone: No  Support Systems: Family member(s)  Patient Expects to be Discharged to[de-identified] Private residence  Current DME Used/Available at Home: None  Prior Level of Function/Work/Activity:  Independent prior. Number of Personal Factors/Comorbidities that affect the Plan of Care: Brief history (0):  LOW COMPLEXITY   ASSESSMENT OF OCCUPATIONAL PERFORMANCE[de-identified]   Most Recent Physical Functioning:   Balance  Sitting: Intact  Standing: With support; Without support                              Mental Status  Neurologic State: Alert; Appropriate for age  Orientation Level: Appropriate for age  Cognition: Appropriate decision making; Appropriate for age attention/concentration; Appropriate safety awareness; Follows commands  Perception: Appears intact  Perseveration: No perseveration noted  Safety/Judgement: Awareness of environment; Fall prevention                Basic ADLs (From Assessment) Complex ADLs (From Assessment)   Basic ADL  Feeding: Independent  Oral Facial Hygiene/Grooming: Setup  Bathing: Minimum assistance  Type of Bath: Chlorhexidine (CHG), Shower  Upper Body Dressing: Setup  Lower Body Dressing: Total assistance  Toileting:  Moderate assistance     Grooming/Bathing/Dressing Activities of Daily Living   Grooming  Grooming Assistance: Stand-by assistance (standing at sink after showering)  Washing Face: Stand-by assistance  Washing Hands: Stand-by assistance  Brushing Teeth: Stand-by assistance  Brushing/Combing Hair: Stand-by assistance Cognitive Retraining  Safety/Judgement: Awareness of environment; Fall prevention   Upper Body Bathing  Bathing Assistance: Supervision/set-up; Stand-by assistance  Position Performed: Seated in chair;Standing  Adaptive Equipment: Grab bar; Shower chair     Lower Body Bathing  Bathing Assistance: Minimum assistance  Perineal  : Supervision/set-up  Position Performed: Standing  Adaptive Equipment: Grab bar  Lower Body : Minimum assistance (dry distal LE only)  Position Performed: Seated in chair;Standing  Adaptive Equipment: Grab bar;Long handled sponge; Shower chair     Upper Body Dressing Assistance  Dressing Assistance: Supervision/set-up  Bra: Supervision/set-up  Pullover Shirt: Supervision/set-up Functional Transfers  Bathroom Mobility: Stand-by assistance;Contact guard assistance  Toilet Transfer : Stand-by assistance;Contact guard assistance  Shower Transfer: Stand-by assistance;Contact guard assistance  Adaptive Equipment: Bedside commode;Grab bars; Shower chair with back   Lower Body Dressing Assistance  Dressing Assistance: Stand-by assistance;Contact guard assistance  Underpants: Stand-by assistance;Contact guard assistance  Protective Undergarmet: Stand-by assistance;Contact guard assistance  Socks: Minimum assistance  Adaptive Equipment Used: Reacher;Walker           Physical Skills Involved:  1. Range of Motion  2. Balance  3. Strength Cognitive Skills Affected (resulting in the inability to perform in a timely and safe manner):  1. NT Psychosocial Skills Affected:  1. nt   Number of elements that affect the Plan of Care: 1-3:  LOW COMPLEXITY   CLINICAL DECISION MAKIN [facility] [address] [address] AM-PAC 6 Clicks   Daily Activity Inpatient Short Form  How much help from another person does the patient currently need. .. Total A Lot A Little None   1. Putting on and taking off regular lower body clothing? [] 1   [] 2   [x] 3   [] 4   2. Bathing (including washing, rinsing, drying)? [] 1   [] 2   [x] 3   [] 4   3.   Toileting, which includes using toilet, bedpan or urinal?   [] 1   [] 2   [x] 3   [] 4   4. Putting on and taking off regular upper body clothing? [] 1   [] 2   [] 3   [x] 4   5. Taking care of personal grooming such as brushing teeth? [] 1   [] 2   [] 3   [x] 4   6. Eating meals? [] 1   [] 2   [] 3   [x] 4   © 2007, Trustees of [address] [place] [address] [address] [address], under license to [employer]. All rights reserved     Score:  Initial: 17 Most Recent:20 discharge 8/10/18    Interpretation of Tool:  Represents activities that are increasingly more difficult (i.e. Bed mobility, Transfers, Gait). Score 24 23 22-20 19-15 14-10 9-7 6     Modifier CH CI CJ CK CL CM CN      ? Self Care:     - CURRENT STATUS: CK - 40%-59% impaired, limited or restricted    - GOAL STATUS: CJ - 20%-39% impaired, limited or restricted    - D/C STATUS:  CJ - 20%-39% impaired, limited or restricted  [place] / Product Type: Medicare /      ·                   TREATMENT:   (In addition to Assessment/Re-Assessment sessions the following treatments were rendered)     Pre-treatment Symptoms/Complaints:    Pain: Initial:   Pain Intensity 1: 0 shower Post Session:  0/10 at rest     Self Care: (25): Procedure(s) (per grid) utilized to improve and/or restore self-care/home management as related to dressing, bathing, toileting and grooming. Required minimal visual, verbal and tactile cueing to facilitate activities of daily living skills, compensatory activities and hip   kit training. Treatment/Session Assessment:     Response to Treatment:  Tolerated well, plans to go to rehab today.     Education:  [] Home Exercises  [x] Fall Precautions  [x] Hip Precautions [] Going Home Video  [] Knee/Hip Prosthesis Review  [x] Walker Management/Safety [x] Adaptive Equipment as Needed       Interdisciplinary Collaboration:   o Occupational Therapist  o Registered Nurse  o Certified Nursing Assistant/Patient Care Technician    After treatment position/precautions:   o Up in chair  o Bed/Chair-wheels locked  o Bed in low position  o Call light within reach  o RN notified     Compliance with Program/Exercises: compliant all of the time. Recommendations/Intent for next treatment session:  Pt progressing with Occupational Therapy and has met 6/6  goals. Patient plans for  continue therapy services at a short term rehabilitation facility. Discharge acute care Occupational Therapy services.         Total Treatment Duration:25  OT Patient Time In/Time Out  Time In: 5227  Time Out: [address],

## 2018-08-11 NOTE — DISCHARGE SUMMARY
[id number] [facility] SUMMARY    Mey Estrella  MR#: [id number]  : 1928  ACCOUNT #: [de-identified]   ADMIT DATE: 2018  DISCHARGE DATE: 08/10/2018    ADMISSION DIAGNOSIS:  Osteoarthritis, left hip. DISCHARGE DIAGNOSIS:  Osteoarthritis of left hip. Mild postop acute blood loss anemia. SERVICE:  Dr. Pankaj Ruiz, Orthopedics. PROCEDURE PERFORMED:  Left total hip arthroplasty performed on  without complication. HISTORY:  The patient is an 40-year-old female with history of progressively worsening left hip pain secondary to osteoarthritis, left hip. Due to failure to adequately respond to nonsurgical treatment, it was felt that the patient was a suitable candidate for left total hip arthroplasty. Therefore, the patient was carried to the OR on 2018 where a left total hip arthroplasty was performed without complication. Postoperataive hospital course has been relatively benign. The patient was noted to have mild postoperative blood loss anemia with hemoglobin of 11.2 on postop day 1, which remained fairly stable at 10.0. On postop day #2, the patient remained asymptomatic and hemodynamically stable during inpatient stay. Physical therapy was consulted. The patient has made slow, but steady progress with regaining mobility with rolling walker assistance. On postop day #3, the patient is afebrile. Vital signs are stable. She is tolerating a regular diet. Denies any bowel or bladder dysfunction. She reports that left hip pain is fairly well controlled. It is felt the patient will be stable for discharge to home with home health today on POD # 3. Discharge instructions include continue physical therapy per total hip protocol. Posterior hip dislocation precautions for 6 weeks postop. Keep left hip incision clean and dry, keep Aquacel dressing in place for 5 days or change if saturated with drainage.   Call if experiencing fever greater than 101 degrees, worsening left hip pain, swelling, adilene-wound redness, or drainage. Discharge med rec completed includes Coreg 6.25 mg 1 p.o. twice daily. DISCHARGE MEDICATIONS:  Include:  1. Aspirin 81 mg 1 twice daily for 30 days for postop DVT prophylaxis. 2.  Dilaudid 2 mg 1 p.o. every 4 hours as needed for pain #50 with no refill. Patient is to follow up with Dr. Fernando Hinkle in 4 weeks for postop recheck and may call with any questions or concerns.       CEDRICK Ambrocio dictating on behalf of MD RICKEY Kuhn/BERNADINE  D: 08/10/2018 12:38     T: 08/11/2018 08:32  JOB #: [id number]

## 2018-08-13 ENCOUNTER — PATIENT OUTREACH (OUTPATIENT)
Dept: CASE MANAGEMENT | Age: 83
End: 2018-08-13

## 2018-08-13 NOTE — PROGRESS NOTES
This note will not be viewable in [address] Patient discharged to a SN[facility] facility. Patient will be included in weekly care coordination calls. Message sent to Chrsiti Cruz RN SNF [facility] [address]

## 2018-08-31 ENCOUNTER — PATIENT OUTREACH (OUTPATIENT)
Dept: CASE MANAGEMENT | Age: 83
End: 2018-08-31

## 2018-08-31 NOTE — PROGRESS NOTES
This note will not be viewable in [address] ZENIA Coordinator called patient for EMMETT MCCURDY Call after DC from [address]. She states that she is doing well, that PT made her a little sore yesterday and that she took some Extra Strength Tylenol which eased the pain. She states that she has and understands all of her medications. Her daughter assists with ADL's and transportation. She is scheduled for a follow up with Dr. Kvng Georges next week. Offered to assist with scheduling PCP appointment and patient states that she would rather wait until the POC for the Hip Replacement is complete with Dr. Kvng Georges prior to seeing PCP. Provided contact information for Care Coordinator to the patient and advised should anything arise that she need assistance with to contact Coordinator. Patient verbalizes understanding and states that she does not have any questions of concerns at this time. Patient was very appreciative of care that she received stating that \"[facility] is the best hospital that she has ever been in and that she tells everyone if they have to go to the hospital that is where they should go\" patient agreeable to a 30 day FU call with EMMETT MCCURDY Coordinator

## 2018-10-16 PROBLEM — M17.12 ARTHRITIS OF LEFT KNEE: Status: ACTIVE | Noted: 2018-10-16

## 2018-10-25 ENCOUNTER — HOSPITAL ENCOUNTER (OUTPATIENT)
Dept: SURGERY | Age: 83
Discharge: HOME | End: 2018-10-25
Attending: ORTHOPAEDIC SURGERY
Payer: MEDICARE

## 2018-10-25 VITALS
WEIGHT: 179.31 LBS | BODY MASS INDEX: 28.14 KG/M2 | DIASTOLIC BLOOD PRESSURE: 58 MMHG | OXYGEN SATURATION: 95 % | TEMPERATURE: 95.6 F | RESPIRATION RATE: 14 BRPM | HEIGHT: 67 IN | HEART RATE: 62 BPM | SYSTOLIC BLOOD PRESSURE: 108 MMHG

## 2018-10-25 LAB
ANION GAP SERPL CALC-SCNC: 7 MMOL/L
APPEARANCE UR: CLEAR
APTT PPP: 29.8 SEC (ref 23.2–35.3)
BACTERIA SPEC CULT: NORMAL
BILIRUB UR QL: NEGATIVE
BUN SERPL-MCNC: 27 MG/DL (ref 8–23)
CALCIUM SERPL-MCNC: 9 MG/DL (ref 8.3–10.4)
CHLORIDE SERPL-SCNC: 107 MMOL/L (ref 98–107)
CO2 SERPL-SCNC: 27 MMOL/L (ref 21–32)
COLOR UR: YELLOW
CREAT SERPL-MCNC: 1.05 MG/DL (ref 0.6–1)
ERYTHROCYTE [DISTWIDTH] IN BLOOD BY AUTOMATED COUNT: 15.9 %
GLUCOSE SERPL-MCNC: 95 MG/DL (ref 65–100)
GLUCOSE UR STRIP.AUTO-MCNC: NEGATIVE MG/DL
HCT VFR BLD AUTO: 36.9 % (ref 35.8–46.3)
HGB BLD-MCNC: 11.2 G/DL (ref 11.7–15.4)
HGB UR QL STRIP: NEGATIVE
INR PPP: 1
KETONES UR QL STRIP.AUTO: NEGATIVE MG/DL
LEUKOCYTE ESTERASE UR QL STRIP.AUTO: NEGATIVE
MCH RBC QN AUTO: 27.1 PG (ref 26.1–32.9)
MCHC RBC AUTO-ENTMCNC: 30.4 G/DL (ref 31.4–35)
MCV RBC AUTO: 89.3 FL (ref 79.6–97.8)
NITRITE UR QL STRIP.AUTO: NEGATIVE
NRBC # BLD: 0 K/UL (ref 0–0.2)
PH UR STRIP: 5.5 [PH] (ref 5–9)
PLATELET # BLD AUTO: 267 K/UL (ref 150–450)
PMV BLD AUTO: 10.4 FL (ref 9.4–12.3)
POTASSIUM SERPL-SCNC: 4.1 MMOL/L (ref 3.5–5.1)
PROT UR STRIP-MCNC: NEGATIVE MG/DL
PROTHROMBIN TIME: 13.7 SEC (ref 11.5–14.5)
RBC # BLD AUTO: 4.13 M/UL (ref 4.05–5.2)
SERVICE CMNT-IMP: NORMAL
SODIUM SERPL-SCNC: 141 MMOL/L (ref 136–145)
SP GR UR REFRACTOMETRY: 1.02 (ref 1–1.02)
UROBILINOGEN UR QL STRIP.AUTO: 0.2 EU/DL (ref 0.2–1)
WBC # BLD AUTO: 5.2 K/UL (ref 4.3–11.1)

## 2018-10-25 PROCEDURE — 77030027138 HC INCENT SPIROMETER -A

## 2018-10-25 PROCEDURE — 85730 THROMBOPLASTIN TIME PARTIAL: CPT

## 2018-10-25 PROCEDURE — 85027 COMPLETE CBC AUTOMATED: CPT

## 2018-10-25 PROCEDURE — 81003 URINALYSIS AUTO W/O SCOPE: CPT

## 2018-10-25 PROCEDURE — 80048 BASIC METABOLIC PNL TOTAL CA: CPT

## 2018-10-25 PROCEDURE — 87641 MR-STAPH DNA AMP PROBE: CPT

## 2018-10-25 PROCEDURE — 85610 PROTHROMBIN TIME: CPT

## 2018-10-25 RX ORDER — ACETAMINOPHEN 325 MG/1
650 TABLET ORAL
COMMUNITY
End: 2018-11-27

## 2018-10-25 NOTE — PERIOP NOTES
Lab results within anesthesia guidelines; MRSA swab result pending. All results sent to pt's PCP, Imagene Harada, MD, per surgeon's order. Recent Results (from the past 12 hour(s)) CBC W/O DIFF Collection Time: 10/25/18 10:25 AM  
Result Value Ref Range WBC 5.2 4.3 - 11.1 K/uL  
 RBC 4.13 4.05 - 5.2 M/uL  
 HGB 11.2 (L) 11.7 - 15.4 g/dL HCT 36.9 35.8 - 46.3 % MCV 89.3 79.6 - 97.8 FL  
 MCH 27.1 26.1 - 32.9 PG  
 MCHC 30.4 (L) 31.4 - 35.0 g/dL  
 RDW 15.9 % PLATELET 670 331 - 919 K/uL MPV 10.4 9.4 - 12.3 FL ABSOLUTE NRBC 0.00 0.0 - 0.2 K/uL METABOLIC PANEL, BASIC Collection Time: 10/25/18 10:25 AM  
Result Value Ref Range Sodium 141 136 - 145 mmol/L Potassium 4.1 3.5 - 5.1 mmol/L Chloride 107 98 - 107 mmol/L  
 CO2 27 21 - 32 mmol/L Anion gap 7 mmol/L Glucose 95 65 - 100 mg/dL BUN 27 (H) 8 - 23 MG/DL Creatinine 1.05 (H) 0.6 - 1.0 MG/DL  
 GFR est AA >60 >60 ml/min/1.73m2 GFR est non-AA 52 ml/min/1.73m2 Calcium 9.0 8.3 - 10.4 MG/DL  
PTT Collection Time: 10/25/18 10:25 AM  
Result Value Ref Range aPTT 29.8 23.2 - 35.3 SEC PROTHROMBIN TIME + INR Collection Time: 10/25/18 10:25 AM  
Result Value Ref Range Prothrombin time 13.7 11.5 - 14.5 sec INR 1.0    
URINALYSIS W/ RFLX MICROSCOPIC Collection Time: 10/25/18 10:25 AM  
Result Value Ref Range Color YELLOW Appearance CLEAR Specific gravity 1.018 1.001 - 1.023    
 pH (UA) 5.5 5.0 - 9.0 Protein NEGATIVE  NEG mg/dL Glucose NEGATIVE  mg/dL Ketone NEGATIVE  NEG mg/dL Bilirubin NEGATIVE  NEG Blood NEGATIVE  NEG Urobilinogen 0.2 0.2 - 1.0 EU/dL Nitrites NEGATIVE  NEG  Leukocyte Esterase NEGATIVE  NEG

## 2018-10-25 NOTE — ADVANCED PRACTICE NURSE
Total Joint Surgery Preoperative Chart Review Patient ID: 
Sandeep Jackson [id number] 
51 y.o. 
11/25/1928 Surgeon: Dr. Delmy Caldwell Date of Surgery: 10/30/2018 Procedure: Total Left Knee Arthroplasty Primary Care Physician: Blanca Melendez [phone] Specialty Physician(s):   
 
Subjective:  
Sandeep Jackson is a 80 y.o. WHITE OR  female who presents for preoperative evaluation for Total Left Knee arthroplasty. This is a preoperative chart review note based on data collected by the nurse at the surgical Pre-Assessment visit. Past Medical History:  
Diagnosis Date  Anemia EGD 10-3-12:  schatzki's ring, duodenal ulcer, gastritis. Colonoscopy 10-3-12: diverticulosis, colon polyp. repeat colonoscopy 10 years  Heart murmur   
 per cardiology note: Murmur: \"3/6 high pitched apical  systolic murmur\"; ECHO done 8/3/18  
 HTN (hypertension), benign  Hypercholesteremia   
 no meds  Migraine  Osteoarthritis Past Surgical History:  
Procedure Laterality Date  ENDOSCOPY, COLON, DIAGNOSTIC  10/3/12  
 diverticulosis, colon polyp  HX APPENDECTOMY  HX BACK SURGERY    
 HX HIP REPLACEMENT Left 08/2018  HX HYSTERECTOMY  HX ORTHOPAEDIC    
 right wrist, left shoulder  HX REFRACTIVE SURGERY    
 HX TONSILLECTOMY Family History Problem Relation Age of Onset  Cancer Sister   
     breast  
  
Social History Tobacco Use  Smoking status: Never Smoker  Smokeless tobacco: Never Used Substance Use Topics  Alcohol use: No  
  Alcohol/week: 0.0 oz  
   
Prior to Admission medications Medication Sig Start Date End Date Taking? Authorizing Provider  
vit C,W-Fu-cjujc-lutein-zeaxan (PRESERVISION AREDS-2) [phone] mg-unit-mg-mg cap Take  by mouth. Yes Provider, Historical  
acetaminophen (TYLENOL) 325 mg tablet Take 650 mg by mouth every four (4) hours as needed for Pain.    Yes Provider, Historical  
 gabapentin (NEURONTIN) 100 mg capsule Take 1 Cap by mouth two (2) times daily as needed. 10/16/18  Yes Clarance Favre, MD  
aspirin delayed-release 81 mg tablet Take 1 Tab by mouth every twelve (12) hours every twelve (12) hours. 8/8/18  Yes Looper, Staci Favre, PA  
carvedilol (COREG) 6.25 mg tablet Take 1 Tab by mouth two (2) times daily (with meals). Patient taking differently: Take 6.25 mg by mouth two (2) times daily (with meals). Take morning of surgery per anesthesia guidelines. 5/31/18  Yes Clarance Favre, MD  
DISABLED PLACARD ([address] [place] [address]) [place] handicapped tag -  
Diagnosis - left knee arthritis M17.12 5/31/18   Clarance Favre, MD  
 
Allergies Allergen Reactions  Codeine Other (comments) GI upset  Penicillins Rash Objective:  
 
Physical Exam:  
Patient Vitals for the past 24 hrs: 
 Temp Pulse Resp BP SpO2  
10/25/18 0943 95.6 °F (35.3 °C) 62 14 108/58 95 % ECG:   
EKG Results None Data Review:  
Labs:  
Recent Results (from the past 24 hour(s)) CBC W/O DIFF Collection Time: 10/25/18 10:25 AM  
Result Value Ref Range WBC 5.2 4.3 - 11.1 K/uL  
 RBC 4.13 4.05 - 5.2 M/uL  
 HGB 11.2 (L) 11.7 - 15.4 g/dL HCT 36.9 35.8 - 46.3 % MCV 89.3 79.6 - 97.8 FL  
 MCH 27.1 26.1 - 32.9 PG  
 MCHC 30.4 (L) 31.4 - 35.0 g/dL  
 RDW 15.9 % PLATELET 648 975 - 848 K/uL MPV 10.4 9.4 - 12.3 FL ABSOLUTE NRBC 0.00 0.0 - 0.2 K/uL METABOLIC PANEL, BASIC Collection Time: 10/25/18 10:25 AM  
Result Value Ref Range Sodium 141 136 - 145 mmol/L Potassium 4.1 3.5 - 5.1 mmol/L Chloride 107 98 - 107 mmol/L  
 CO2 27 21 - 32 mmol/L Anion gap 7 mmol/L Glucose 95 65 - 100 mg/dL BUN 27 (H) 8 - 23 MG/DL Creatinine 1.05 (H) 0.6 - 1.0 MG/DL  
 GFR est AA >60 >60 ml/min/1.73m2 GFR est non-AA 52 ml/min/1.73m2 Calcium 9.0 8.3 - 10.4 MG/DL  
PTT Collection Time: 10/25/18 10:25 AM  
Result Value Ref Range aPTT 29.8 23.2 - 35.3 SEC PROTHROMBIN TIME + INR Collection Time: 10/25/18 10:25 AM  
Result Value Ref Range Prothrombin time 13.7 11.5 - 14.5 sec INR 1.0    
URINALYSIS W/ RFLX MICROSCOPIC Collection Time: 10/25/18 10:25 AM  
Result Value Ref Range Color YELLOW Appearance CLEAR Specific gravity 1.018 1.001 - 1.023    
 pH (UA) 5.5 5.0 - 9.0 Protein NEGATIVE  NEG mg/dL Glucose NEGATIVE  mg/dL Ketone NEGATIVE  NEG mg/dL Bilirubin NEGATIVE  NEG Blood NEGATIVE  NEG Urobilinogen 0.2 0.2 - 1.0 EU/dL Nitrites NEGATIVE  NEG Leukocyte Esterase NEGATIVE  NEG Problem List: 
) Patient Active Problem List  
Diagnosis Code  Anemia D64.9  
 HTN (hypertension), benign I10  
 Hypercholesteremia E78.00  Migraine G43.909  Insomnia G47.00  Depression F32.9  Abnormal EKG R94.31  Systolic murmur W74.0  Essential hypertension with goal blood pressure less than 130/85 I10  Mixed hyperlipidemia E78.2  Arthritis of left hip M16.12  
 Arthritis of left knee M17.12 Total Joint Surgery Pre-Assessment Recommendations:   
Patient with advanced age 80 and HTN. Patient would benefit from inpatient hospitalization with total knee surgery. Signed By: LEILA Zavala October 25, 2018

## 2018-10-25 NOTE — PERIOP NOTES
Patient verified name and . Order for consent not found in EHR- unable to determine if it matches case posting; patient verified. Type 3 surgery, joint PAT assessment complete. Labs per surgeon: no orders in EMR- Addison George (PA to surgeon) in [address] and notified of need for orders while pt here in [id number] [facility] states he will place orders as soon as surgery complete (telephone orders received to collect CBC, BMP, PT/PTT, UA and MRSA swab) Labs per anesthesia protocol: no additional labs needed EKG: done 18 at [facility] preop clearance appt- reviewed by anesthesia (dr suero) and cardiologist (dr Etelvina Welch) Pt had ECHO (8/3/18) and stress test (8/3/18) [facility]. Results in EMR for anesthesia reference. Telephone encounter 18 from cardiologist states \"Nuclear scan normal - low risk for sugery. \" Hibiclens and instructions to return bottle on DOS given per hospital policy. Pt given incentive spirometer during PAT assessment with instructions for use. Pt demonstrated incentive spirometer during PAT assessment and verbalized understanding to continue to use from now until surgery twice daily and to bring DOS. Patient provided with handouts including Guide to Surgery, Pain Management, Hand Hygiene, Blood Transfusion Education, and [place] [facility] Brochure. Patient answered medical/surgical history questions at their best of ability. All prior to admission medications documented in [facility] Care. Original medication prescription bottles (gabapentin and carvedilol) visualized during patient appointment. Patient instructed to hold all vitamins 7 days prior to surgery and NSAIDS 5 days prior to surgery. Medications to be held: vitamins.  
 
Patient instructed to continue previous medications as prescribed prior to surgery and to take the following medications the day of surgery according to anesthesia guidelines with a small sip of water: coreg, gabapentin, asa 81mg, tylenol. Patient teach back successful and patient demonstrates knowledge of instruction.

## 2018-10-30 ENCOUNTER — ANESTHESIA (OUTPATIENT)
Dept: SURGERY | Age: 83
DRG: 470 | End: 2018-10-30
Payer: MEDICARE

## 2018-10-30 ENCOUNTER — ANESTHESIA EVENT (OUTPATIENT)
Dept: SURGERY | Age: 83
DRG: 470 | End: 2018-10-30
Payer: MEDICARE

## 2018-10-30 ENCOUNTER — HOSPITAL ENCOUNTER (INPATIENT)
Age: 83
LOS: 3 days | Discharge: NURSING, REHAB OR LONG-TERM CARE | DRG: 470 | End: 2018-11-02
Attending: ORTHOPAEDIC SURGERY | Admitting: ORTHOPAEDIC SURGERY
Payer: MEDICARE

## 2018-10-30 DIAGNOSIS — D64.9 ANEMIA, UNSPECIFIED TYPE: ICD-10-CM

## 2018-10-30 DIAGNOSIS — M17.12 ARTHRITIS OF LEFT KNEE: ICD-10-CM

## 2018-10-30 DIAGNOSIS — I10 ESSENTIAL HYPERTENSION WITH GOAL BLOOD PRESSURE LESS THAN 130/85: ICD-10-CM

## 2018-10-30 LAB
ABO + RH BLD: NORMAL
BLOOD GROUP ANTIBODIES SERPL: NORMAL
GLUCOSE BLD STRIP.AUTO-MCNC: 123 MG/DL (ref 65–100)
HGB BLD-MCNC: 10.7 G/DL (ref 11.7–15.4)
SPECIMEN EXP DATE BLD: NORMAL

## 2018-10-30 PROCEDURE — 74011000250 HC RX REV CODE- 250: Performed by: ORTHOPAEDIC SURGERY

## 2018-10-30 PROCEDURE — 77030002966 HC SUT PDS J&J -A: Performed by: ORTHOPAEDIC SURGERY

## 2018-10-30 PROCEDURE — 77030020256 HC SOL INJ NACL 0.9%  500ML: Performed by: ORTHOPAEDIC SURGERY

## 2018-10-30 PROCEDURE — 77030031139 HC SUT VCRL2 J&J -A: Performed by: ORTHOPAEDIC SURGERY

## 2018-10-30 PROCEDURE — 77030032490 HC SLV COMPR SCD KNE COVD -B

## 2018-10-30 PROCEDURE — 74011250636 HC RX REV CODE- 250/636: Performed by: PHYSICIAN ASSISTANT

## 2018-10-30 PROCEDURE — 74011000258 HC RX REV CODE- 258: Performed by: ORTHOPAEDIC SURGERY

## 2018-10-30 PROCEDURE — C1776 JOINT DEVICE (IMPLANTABLE): HCPCS | Performed by: ORTHOPAEDIC SURGERY

## 2018-10-30 PROCEDURE — 0SRD0J9 REPLACEMENT OF LEFT KNEE JOINT WITH SYNTHETIC SUBSTITUTE, CEMENTED, OPEN APPROACH: ICD-10-PCS | Performed by: ORTHOPAEDIC SURGERY

## 2018-10-30 PROCEDURE — 97165 OT EVAL LOW COMPLEX 30 MIN: CPT

## 2018-10-30 PROCEDURE — 65270000029 HC RM PRIVATE

## 2018-10-30 PROCEDURE — 77030013727 HC IRR FAN PULSVC ZIMM -B: Performed by: ORTHOPAEDIC SURGERY

## 2018-10-30 PROCEDURE — 74011250636 HC RX REV CODE- 250/636

## 2018-10-30 PROCEDURE — 74011000250 HC RX REV CODE- 250

## 2018-10-30 PROCEDURE — 76010000162 HC OR TIME 1.5 TO 2 HR INTENSV-TIER 1: Performed by: ORTHOPAEDIC SURGERY

## 2018-10-30 PROCEDURE — 77030006835 HC BLD SAW SAG STRY -B: Performed by: ORTHOPAEDIC SURGERY

## 2018-10-30 PROCEDURE — 77030002933 HC SUT MCRYL J&J -A: Performed by: ORTHOPAEDIC SURGERY

## 2018-10-30 PROCEDURE — 97110 THERAPEUTIC EXERCISES: CPT

## 2018-10-30 PROCEDURE — 74011250636 HC RX REV CODE- 250/636: Performed by: ORTHOPAEDIC SURGERY

## 2018-10-30 PROCEDURE — 74011250637 HC RX REV CODE- 250/637: Performed by: ANESTHESIOLOGY

## 2018-10-30 PROCEDURE — 74011250636 HC RX REV CODE- 250/636: Performed by: ANESTHESIOLOGY

## 2018-10-30 PROCEDURE — 74011250637 HC RX REV CODE- 250/637: Performed by: ORTHOPAEDIC SURGERY

## 2018-10-30 PROCEDURE — 77030008467 HC STPLR SKN COVD -B: Performed by: ORTHOPAEDIC SURGERY

## 2018-10-30 PROCEDURE — 74011250637 HC RX REV CODE- 250/637: Performed by: PHYSICIAN ASSISTANT

## 2018-10-30 PROCEDURE — 77030003665 HC NDL SPN BBMI -A: Performed by: ANESTHESIOLOGY

## 2018-10-30 PROCEDURE — C1713 ANCHOR/SCREW BN/BN,TIS/BN: HCPCS | Performed by: ORTHOPAEDIC SURGERY

## 2018-10-30 PROCEDURE — 77030035643 HC BLD SAW OSC PRECIS STRY -C: Performed by: ORTHOPAEDIC SURGERY

## 2018-10-30 PROCEDURE — 86580 TB INTRADERMAL TEST: CPT | Performed by: ORTHOPAEDIC SURGERY

## 2018-10-30 PROCEDURE — 74011000302 HC RX REV CODE- 302: Performed by: ORTHOPAEDIC SURGERY

## 2018-10-30 PROCEDURE — 82962 GLUCOSE BLOOD TEST: CPT

## 2018-10-30 PROCEDURE — 36415 COLL VENOUS BLD VENIPUNCTURE: CPT

## 2018-10-30 PROCEDURE — 77030011208: Performed by: ORTHOPAEDIC SURGERY

## 2018-10-30 PROCEDURE — 77030018836 HC SOL IRR NACL ICUM -A: Performed by: ORTHOPAEDIC SURGERY

## 2018-10-30 PROCEDURE — 76210000006 HC OR PH I REC 0.5 TO 1 HR: Performed by: ORTHOPAEDIC SURGERY

## 2018-10-30 PROCEDURE — 77030020263 HC SOL INJ SOD CL0.9% LFCR 1000ML

## 2018-10-30 PROCEDURE — 77030006720 HC BLD PAT RMR ZIMM -B: Performed by: ORTHOPAEDIC SURGERY

## 2018-10-30 PROCEDURE — 77030012935 HC DRSG AQUACEL BMS -B: Performed by: ORTHOPAEDIC SURGERY

## 2018-10-30 PROCEDURE — 94760 N-INVAS EAR/PLS OXIMETRY 1: CPT

## 2018-10-30 PROCEDURE — 77030034849: Performed by: ORTHOPAEDIC SURGERY

## 2018-10-30 PROCEDURE — 85018 HEMOGLOBIN: CPT

## 2018-10-30 PROCEDURE — 76060000034 HC ANESTHESIA 1.5 TO 2 HR: Performed by: ORTHOPAEDIC SURGERY

## 2018-10-30 PROCEDURE — 97161 PT EVAL LOW COMPLEX 20 MIN: CPT

## 2018-10-30 PROCEDURE — 77030003602 HC NDL NRV BLK BBMI -B: Performed by: ANESTHESIOLOGY

## 2018-10-30 PROCEDURE — 77030019557 HC ELECTRD VES SEAL MEDT -F: Performed by: ORTHOPAEDIC SURGERY

## 2018-10-30 PROCEDURE — 77030007880 HC KT SPN EPDRL BBMI -B: Performed by: ANESTHESIOLOGY

## 2018-10-30 PROCEDURE — 77030020782 HC GWN BAIR PAWS FLX 3M -B: Performed by: ANESTHESIOLOGY

## 2018-10-30 PROCEDURE — 86901 BLOOD TYPING SEROLOGIC RH(D): CPT

## 2018-10-30 DEVICE — (D)CEMENT BNE HV R 40GM -- DUPE USE ITEM 353850: Type: IMPLANTABLE DEVICE | Site: KNEE | Status: FUNCTIONAL

## 2018-10-30 DEVICE — INSERT TIB SZ 6 THK13MM UNIV KNEE CONVENTIONAL POLYETH POST: Type: IMPLANTABLE DEVICE | Site: KNEE | Status: FUNCTIONAL

## 2018-10-30 DEVICE — COMPONENT PAT DIA33MM THK9MM KNEE SYMMETRIC NP PRI CEM W/O: Type: IMPLANTABLE DEVICE | Site: KNEE | Status: FUNCTIONAL

## 2018-10-30 DEVICE — COMPNT FEM POST STBL 5 L --: Type: IMPLANTABLE DEVICE | Site: KNEE | Status: FUNCTIONAL

## 2018-10-30 DEVICE — BASEPLT TIB REV UNIV SZ 6 R/L -- TRIATHLON: Type: IMPLANTABLE DEVICE | Site: KNEE | Status: FUNCTIONAL

## 2018-10-30 RX ORDER — SODIUM CHLORIDE, SODIUM LACTATE, POTASSIUM CHLORIDE, CALCIUM CHLORIDE 600; 310; 30; 20 MG/100ML; MG/100ML; MG/100ML; MG/100ML
100 INJECTION, SOLUTION INTRAVENOUS CONTINUOUS
Status: DISCONTINUED | OUTPATIENT
Start: 2018-10-30 | End: 2018-10-30 | Stop reason: HOSPADM

## 2018-10-30 RX ORDER — NALOXONE HYDROCHLORIDE 0.4 MG/ML
.2-.4 INJECTION, SOLUTION INTRAMUSCULAR; INTRAVENOUS; SUBCUTANEOUS
Status: DISCONTINUED | OUTPATIENT
Start: 2018-10-30 | End: 2018-11-02 | Stop reason: HOSPADM

## 2018-10-30 RX ORDER — PROPOFOL 10 MG/ML
INJECTION, EMULSION INTRAVENOUS
Status: DISCONTINUED | OUTPATIENT
Start: 2018-10-30 | End: 2018-10-30 | Stop reason: HOSPADM

## 2018-10-30 RX ORDER — AMOXICILLIN 250 MG
2 CAPSULE ORAL DAILY
Status: DISCONTINUED | OUTPATIENT
Start: 2018-10-31 | End: 2018-11-02 | Stop reason: HOSPADM

## 2018-10-30 RX ORDER — ROPIVACAINE HYDROCHLORIDE 2 MG/ML
INJECTION, SOLUTION EPIDURAL; INFILTRATION; PERINEURAL AS NEEDED
Status: DISCONTINUED | OUTPATIENT
Start: 2018-10-30 | End: 2018-10-30 | Stop reason: HOSPADM

## 2018-10-30 RX ORDER — CARVEDILOL 6.25 MG/1
6.25 TABLET ORAL 2 TIMES DAILY WITH MEALS
Status: DISCONTINUED | OUTPATIENT
Start: 2018-10-30 | End: 2018-11-02 | Stop reason: HOSPADM

## 2018-10-30 RX ORDER — LIDOCAINE HYDROCHLORIDE 10 MG/ML
0.3 INJECTION, SOLUTION EPIDURAL; INFILTRATION; INTRACAUDAL; PERINEURAL ONCE
Status: DISCONTINUED | OUTPATIENT
Start: 2018-10-30 | End: 2018-10-30 | Stop reason: HOSPADM

## 2018-10-30 RX ORDER — OXYCODONE HYDROCHLORIDE 5 MG/1
5 TABLET ORAL
Status: DISCONTINUED | OUTPATIENT
Start: 2018-10-30 | End: 2018-10-31

## 2018-10-30 RX ORDER — CELECOXIB 200 MG/1
200 CAPSULE ORAL ONCE
Status: COMPLETED | OUTPATIENT
Start: 2018-10-30 | End: 2018-10-30

## 2018-10-30 RX ORDER — MIDAZOLAM HYDROCHLORIDE 1 MG/ML
2 INJECTION, SOLUTION INTRAMUSCULAR; INTRAVENOUS
Status: COMPLETED | OUTPATIENT
Start: 2018-10-30 | End: 2018-10-30

## 2018-10-30 RX ORDER — ACETAMINOPHEN 10 MG/ML
1000 INJECTION, SOLUTION INTRAVENOUS ONCE
Status: COMPLETED | OUTPATIENT
Start: 2018-10-30 | End: 2018-10-30

## 2018-10-30 RX ORDER — HYDROMORPHONE HYDROCHLORIDE 2 MG/ML
0.5 INJECTION, SOLUTION INTRAMUSCULAR; INTRAVENOUS; SUBCUTANEOUS
Status: DISCONTINUED | OUTPATIENT
Start: 2018-10-30 | End: 2018-10-30 | Stop reason: HOSPADM

## 2018-10-30 RX ORDER — SODIUM CHLORIDE 0.9 % (FLUSH) 0.9 %
5-10 SYRINGE (ML) INJECTION EVERY 8 HOURS
Status: DISCONTINUED | OUTPATIENT
Start: 2018-10-30 | End: 2018-10-30 | Stop reason: HOSPADM

## 2018-10-30 RX ORDER — SODIUM CHLORIDE 0.9 % (FLUSH) 0.9 %
5-10 SYRINGE (ML) INJECTION AS NEEDED
Status: DISCONTINUED | OUTPATIENT
Start: 2018-10-30 | End: 2018-10-30 | Stop reason: HOSPADM

## 2018-10-30 RX ORDER — VANCOMYCIN HYDROCHLORIDE 1 G/20ML
INJECTION, POWDER, LYOPHILIZED, FOR SOLUTION INTRAVENOUS AS NEEDED
Status: DISCONTINUED | OUTPATIENT
Start: 2018-10-30 | End: 2018-10-30 | Stop reason: HOSPADM

## 2018-10-30 RX ORDER — KETOROLAC TROMETHAMINE 30 MG/ML
INJECTION, SOLUTION INTRAMUSCULAR; INTRAVENOUS AS NEEDED
Status: DISCONTINUED | OUTPATIENT
Start: 2018-10-30 | End: 2018-10-30 | Stop reason: HOSPADM

## 2018-10-30 RX ORDER — CEFAZOLIN SODIUM/WATER 2 G/20 ML
2 SYRINGE (ML) INTRAVENOUS ONCE
Status: COMPLETED | OUTPATIENT
Start: 2018-10-30 | End: 2018-10-30

## 2018-10-30 RX ORDER — ONDANSETRON 2 MG/ML
INJECTION INTRAMUSCULAR; INTRAVENOUS AS NEEDED
Status: DISCONTINUED | OUTPATIENT
Start: 2018-10-30 | End: 2018-10-30 | Stop reason: HOSPADM

## 2018-10-30 RX ORDER — TRANEXAMIC ACID 100 MG/ML
INJECTION, SOLUTION INTRAVENOUS AS NEEDED
Status: DISCONTINUED | OUTPATIENT
Start: 2018-10-30 | End: 2018-10-30 | Stop reason: HOSPADM

## 2018-10-30 RX ORDER — ACETAMINOPHEN 500 MG
1000 TABLET ORAL ONCE
Status: DISCONTINUED | OUTPATIENT
Start: 2018-10-30 | End: 2018-10-30 | Stop reason: HOSPADM

## 2018-10-30 RX ORDER — CEFAZOLIN SODIUM/WATER 2 G/20 ML
2 SYRINGE (ML) INTRAVENOUS EVERY 8 HOURS
Status: COMPLETED | OUTPATIENT
Start: 2018-10-30 | End: 2018-10-31

## 2018-10-30 RX ORDER — ASPIRIN 81 MG/1
81 TABLET ORAL EVERY 12 HOURS
Status: DISCONTINUED | OUTPATIENT
Start: 2018-10-30 | End: 2018-11-02 | Stop reason: HOSPADM

## 2018-10-30 RX ORDER — FENTANYL CITRATE 50 UG/ML
INJECTION, SOLUTION INTRAMUSCULAR; INTRAVENOUS AS NEEDED
Status: DISCONTINUED | OUTPATIENT
Start: 2018-10-30 | End: 2018-10-30 | Stop reason: HOSPADM

## 2018-10-30 RX ORDER — NEOMYCIN AND POLYMYXIN B SULFATES 40; 200000 MG/ML; [USP'U]/ML
SOLUTION IRRIGATION AS NEEDED
Status: DISCONTINUED | OUTPATIENT
Start: 2018-10-30 | End: 2018-10-30 | Stop reason: HOSPADM

## 2018-10-30 RX ORDER — ACETAMINOPHEN 500 MG
1000 TABLET ORAL EVERY 6 HOURS
Status: DISCONTINUED | OUTPATIENT
Start: 2018-10-31 | End: 2018-10-31

## 2018-10-30 RX ORDER — ZOLPIDEM TARTRATE 5 MG/1
5 TABLET ORAL
Status: DISCONTINUED | OUTPATIENT
Start: 2018-10-30 | End: 2018-11-02 | Stop reason: HOSPADM

## 2018-10-30 RX ORDER — SODIUM CHLORIDE 0.9 % (FLUSH) 0.9 %
5-10 SYRINGE (ML) INJECTION AS NEEDED
Status: DISCONTINUED | OUTPATIENT
Start: 2018-10-30 | End: 2018-11-02 | Stop reason: HOSPADM

## 2018-10-30 RX ORDER — EPHEDRINE SULFATE 50 MG/ML
INJECTION, SOLUTION INTRAVENOUS AS NEEDED
Status: DISCONTINUED | OUTPATIENT
Start: 2018-10-30 | End: 2018-10-30 | Stop reason: HOSPADM

## 2018-10-30 RX ORDER — FENTANYL CITRATE 50 UG/ML
100 INJECTION, SOLUTION INTRAMUSCULAR; INTRAVENOUS ONCE
Status: COMPLETED | OUTPATIENT
Start: 2018-10-30 | End: 2018-10-30

## 2018-10-30 RX ORDER — HYDROMORPHONE HYDROCHLORIDE 2 MG/ML
1 INJECTION, SOLUTION INTRAMUSCULAR; INTRAVENOUS; SUBCUTANEOUS
Status: DISCONTINUED | OUTPATIENT
Start: 2018-10-30 | End: 2018-11-02 | Stop reason: HOSPADM

## 2018-10-30 RX ORDER — DIPHENHYDRAMINE HCL 25 MG
25 CAPSULE ORAL
Status: DISCONTINUED | OUTPATIENT
Start: 2018-10-30 | End: 2018-11-02 | Stop reason: HOSPADM

## 2018-10-30 RX ORDER — ONDANSETRON 8 MG/1
4 TABLET, ORALLY DISINTEGRATING ORAL
Status: DISCONTINUED | OUTPATIENT
Start: 2018-10-30 | End: 2018-11-02 | Stop reason: HOSPADM

## 2018-10-30 RX ORDER — DEXAMETHASONE SODIUM PHOSPHATE 100 MG/10ML
10 INJECTION INTRAMUSCULAR; INTRAVENOUS ONCE
Status: ACTIVE | OUTPATIENT
Start: 2018-10-31 | End: 2018-11-01

## 2018-10-30 RX ORDER — SODIUM CHLORIDE 0.9 % (FLUSH) 0.9 %
5-10 SYRINGE (ML) INJECTION EVERY 8 HOURS
Status: DISCONTINUED | OUTPATIENT
Start: 2018-10-30 | End: 2018-11-02 | Stop reason: HOSPADM

## 2018-10-30 RX ORDER — DEXAMETHASONE SODIUM PHOSPHATE 100 MG/10ML
INJECTION INTRAMUSCULAR; INTRAVENOUS AS NEEDED
Status: DISCONTINUED | OUTPATIENT
Start: 2018-10-30 | End: 2018-10-30 | Stop reason: HOSPADM

## 2018-10-30 RX ORDER — OXYCODONE HYDROCHLORIDE 5 MG/1
10 TABLET ORAL
Status: DISCONTINUED | OUTPATIENT
Start: 2018-10-30 | End: 2018-10-30 | Stop reason: HOSPADM

## 2018-10-30 RX ORDER — SODIUM CHLORIDE 9 MG/ML
100 INJECTION, SOLUTION INTRAVENOUS CONTINUOUS
Status: DISPENSED | OUTPATIENT
Start: 2018-10-30 | End: 2018-11-01

## 2018-10-30 RX ORDER — DIPHENHYDRAMINE HCL 25 MG
25 CAPSULE ORAL
Status: DISCONTINUED | OUTPATIENT
Start: 2018-10-30 | End: 2018-10-30

## 2018-10-30 RX ORDER — LIDOCAINE HYDROCHLORIDE 10 MG/ML
INJECTION, SOLUTION EPIDURAL; INFILTRATION; INTRACAUDAL; PERINEURAL
Status: COMPLETED | OUTPATIENT
Start: 2018-10-30 | End: 2018-10-30

## 2018-10-30 RX ORDER — CELECOXIB 200 MG/1
200 CAPSULE ORAL EVERY 12 HOURS
Status: DISCONTINUED | OUTPATIENT
Start: 2018-10-30 | End: 2018-11-02 | Stop reason: HOSPADM

## 2018-10-30 RX ADMIN — SODIUM CHLORIDE, SODIUM LACTATE, POTASSIUM CHLORIDE, AND CALCIUM CHLORIDE: 600; 310; 30; 20 INJECTION, SOLUTION INTRAVENOUS at 11:27

## 2018-10-30 RX ADMIN — CARVEDILOL 6.25 MG: 6.25 TABLET, FILM COATED ORAL at 16:49

## 2018-10-30 RX ADMIN — SODIUM CHLORIDE 100 ML/HR: 900 INJECTION, SOLUTION INTRAVENOUS at 16:57

## 2018-10-30 RX ADMIN — TRANEXAMIC ACID 1 G: 100 INJECTION, SOLUTION INTRAVENOUS at 09:59

## 2018-10-30 RX ADMIN — Medication 5 ML: at 16:58

## 2018-10-30 RX ADMIN — FENTANYL CITRATE 50 MCG: 50 INJECTION, SOLUTION INTRAMUSCULAR; INTRAVENOUS at 09:53

## 2018-10-30 RX ADMIN — CELECOXIB 200 MG: 200 CAPSULE ORAL at 08:44

## 2018-10-30 RX ADMIN — ONDANSETRON 4 MG: 2 INJECTION INTRAMUSCULAR; INTRAVENOUS at 11:05

## 2018-10-30 RX ADMIN — TUBERCULIN PURIFIED PROTEIN DERIVATIVE 5 UNITS: 5 INJECTION, SOLUTION INTRADERMAL at 08:32

## 2018-10-30 RX ADMIN — ASPIRIN 81 MG: 81 TABLET, DELAYED RELEASE ORAL at 22:12

## 2018-10-30 RX ADMIN — SODIUM CHLORIDE, SODIUM LACTATE, POTASSIUM CHLORIDE, AND CALCIUM CHLORIDE: 600; 310; 30; 20 INJECTION, SOLUTION INTRAVENOUS at 09:47

## 2018-10-30 RX ADMIN — Medication 3 AMPULE: at 08:45

## 2018-10-30 RX ADMIN — Medication 2 G: at 17:20

## 2018-10-30 RX ADMIN — MIDAZOLAM 1 MG: 1 INJECTION INTRAMUSCULAR; INTRAVENOUS at 09:16

## 2018-10-30 RX ADMIN — PROPOFOL 25 MCG/KG/MIN: 10 INJECTION, EMULSION INTRAVENOUS at 10:02

## 2018-10-30 RX ADMIN — EPHEDRINE SULFATE 10 MG: 50 INJECTION, SOLUTION INTRAVENOUS at 10:19

## 2018-10-30 RX ADMIN — CELECOXIB 200 MG: 200 CAPSULE ORAL at 22:12

## 2018-10-30 RX ADMIN — FENTANYL CITRATE 50 MCG: 50 INJECTION INTRAMUSCULAR; INTRAVENOUS at 09:27

## 2018-10-30 RX ADMIN — LIDOCAINE HYDROCHLORIDE 3 ML: 10 INJECTION, SOLUTION EPIDURAL; INFILTRATION; INTRACAUDAL; PERINEURAL at 10:03

## 2018-10-30 RX ADMIN — Medication 2 G: at 10:02

## 2018-10-30 RX ADMIN — Medication 1 AMPULE: at 22:12

## 2018-10-30 RX ADMIN — ROPIVACAINE HYDROCHLORIDE 20 ML: 2 INJECTION, SOLUTION EPIDURAL; INFILTRATION; PERINEURAL at 09:19

## 2018-10-30 RX ADMIN — SODIUM CHLORIDE, SODIUM LACTATE, POTASSIUM CHLORIDE, AND CALCIUM CHLORIDE 100 ML/HR: 600; 310; 30; 20 INJECTION, SOLUTION INTRAVENOUS at 08:32

## 2018-10-30 RX ADMIN — ACETAMINOPHEN 1000 MG: 10 INJECTION, SOLUTION INTRAVENOUS at 17:19

## 2018-10-30 RX ADMIN — SODIUM CHLORIDE 100 ML/HR: 900 INJECTION, SOLUTION INTRAVENOUS at 22:15

## 2018-10-30 RX ADMIN — DEXAMETHASONE SODIUM PHOSPHATE 5 MG: 100 INJECTION INTRAMUSCULAR; INTRAVENOUS at 11:05

## 2018-10-30 NOTE — PROGRESS NOTES
Pt up in recliner with family at bedside. Assessment unchanged. Pt denies any pain at present from knee. VSS. Pt has strong push/ pulls to both feet and wiggles all toes well. inst pt to call for nay needs. ,

## 2018-10-30 NOTE — H&P
[employer] History and Physical Exam 
 
Patient ID: 
Mariana Ji [id number] 
 
26 y.o. 
11/25/1928 Today: October 30, 2018 Vitals Signs: Reviewed as noted in medical record. Allergies: Allergies Allergen Reactions  Codeine Other (comments) GI upset  Penicillins Rash CC: Left knee pain HPI:  Pt complains of left knee pain and with difficulty ambulating. Relevant PMH:  
Past Medical History:  
Diagnosis Date  Anemia EGD 10-3-12:  schatzki's ring, duodenal ulcer, gastritis. Colonoscopy 10-3-12: diverticulosis, colon polyp. repeat colonoscopy 10 years  Heart murmur   
 per cardiology note: Murmur: \"3/6 high pitched apical  systolic murmur\"; ECHO done 8/3/18  
 HTN (hypertension), benign  Hypercholesteremia   
 no meds  Migraine  Osteoarthritis Objective:  
                 HEENT: NC/AT Lungs:  clear Heart:   rrr Abdomen: soft Extremities:  Pain with rom of the left knee joint Radiographs: reveal osteoarthritis with loss of joint space and bone spurs. Assessment: Primary osteoarthritis of left knee [M17.12] Plan:  Proceed with scheduled Procedure(s) (LRB): LEFT KNEE ARTHROPLASTY TOTAL (Left) . The patient has failed conservative treatment including NSAIDS, and injections. Due to the amount of pain the patient is experiencing we will proceed with scheduled procedure. It is also felt that the patient is high risk for postoperative complications due to her advanced age and history of multiple chronic medical problems. Signed By: CEDRICK Beckett October 30, 2018

## 2018-10-30 NOTE — PROGRESS NOTES
10/30/18 1409 Oxygen Therapy O2 Sat (%) 93 % O2 Device Room air Incentive Spirometry Treatment Actual Volume (ml) 1500 ml Number of Attempts 2 Patient achieved     1500     Ml/sec on IS. Patient encouraged to do every hour while awake-patient agreed and demonstrated. No shortness of breath or distress noted. BS are clear b/l. Joint Camp notes reviewed-no recommendations

## 2018-10-30 NOTE — ANESTHESIA PROCEDURE NOTES
Adductor canal block with ultrasound Start time: 10/30/2018 9:16 AM 
End time: 10/30/2018 9:19 AM 
Performed by: Sonali Hendrickson MD 
Authorized by: Sonali Hendrickson MD  
 
 
Pre-procedure: Indications: at surgeon's request and post-op pain management Preanesthetic Checklist: patient identified, risks and benefits discussed, site marked, timeout performed, anesthesia consent given and patient being monitored Timeout Time: 09:16 Block Type:  
Block Type: Adductor canal 
Laterality:  Left Monitoring:  Continuous pulse ox, frequent vital sign checks, heart rate, oxygen and responsive to questions Injection Technique:  Single shot Procedures: ultrasound guided Patient Position: supine Prep: chlorhexidine Location:  Mid thigh Needle Gauge:  20 G Needle Localization:  Ultrasound guidance Assessment: 
Number of attempts:  1 Injection Assessment:  Incremental injection every 5 mL, local visualized surrounding nerve on ultrasound, negative aspiration for blood, no intravascular symptoms, no paresthesia and ultrasound image on chart Patient tolerance:  Patient tolerated the procedure well with no immediate complications

## 2018-10-30 NOTE — PERIOP NOTES
Betadine lavage:  17.5cc of betadine lot # [id number] , exp. Date 01/20 , 
in 500cc of . 9NS Lot # [id number] , exp. Date :4/1/2021. Left knee

## 2018-10-30 NOTE — ANESTHESIA PREPROCEDURE EVALUATION
Anesthetic History No history of anesthetic complications Review of Systems / Medical History Patient summary reviewed and pertinent labs reviewed Pulmonary Within defined limits Neuro/Psych Headaches and psychiatric history (Depression) Cardiovascular Hypertension: well controlled Hyperlipidemia Exercise tolerance: <4 METS: Limited by knee pain Comments: Nml stress and ECHO 8/2018 Denies CP, SOB or changes in functional status GI/Hepatic/Renal 
  
GERD: well controlled Endo/Other Arthritis Other Findings Physical Exam 
 
Airway Mallampati: II 
TM Distance: 4 - 6 cm Neck ROM: normal range of motion Mouth opening: Diminished (comment) Cardiovascular Regular rate and rhythm,  S1 and S2 normal,  no murmur, click, rub, or gallop Rhythm: regular Rate: normal 
 
 
 
 Dental 
 
Dentition: Upper partial plate Pulmonary Breath sounds clear to auscultation Abdominal 
GI exam deferred Other Findings Anesthetic Plan ASA: 3 Anesthesia type: spinal 
 
 
Post-op pain plan if not by surgeon: peripheral nerve block single Anesthetic plan and risks discussed with: Patient

## 2018-10-30 NOTE — ANESTHESIA PROCEDURE NOTES
Spinal Block Start time: 10/30/2018 9:55 AM 
End time: 10/30/2018 9:59 AM 
Performed by: Georgie Izaguirre MD 
Authorized by: Georgie Izaguirre MD  
 
Pre-procedure: Indications: primary anesthetic  Preanesthetic Checklist: patient identified, risks and benefits discussed, anesthesia consent, site marked, patient being monitored and timeout performed Timeout Time: 09:48 Spinal Block:  
Patient Position:  Seated Prep Region:  Lumbar Location:  L2-3 Technique:  Single shot Needle:  
Needle Type:  Quincke Needle Gauge:  25 G Attempts:  1 Events: CSF confirmed, no blood with aspiration and no paresthesia Assessment: 
Insertion:  Uncomplicated Patient tolerance:  Patient tolerated the procedure well with no immediate complications Single pass, clear CSF

## 2018-10-30 NOTE — PHYSICIAN ADVISORY
Letter of Determination: Inpatient Status Appropriate This patient was originally hospitalized as Inpatient Status on 10/30/2018 for scheduled left total knee arthroplasty. This patient is appropriate for Inpatient Admission in accordance with CMS regulation Section 43 .3. Specifically, patient's stay is expected to be more than Two Midnights and was medically necessary. The patient's stay was medically necessary based on extreme advanced age of 80, hypertension, and provider concern for high risk for surgical complication. Consistent with CMS guidelines, patient meets for inpatient status. It is our recommendation that this patient's hospitalization status should be INPATIENT status.  
  
The final decision regarding the patient's hospitalization status depends on the attending physician's judgement. Tereso Flores MD, FRANCISCO, Physician Advisor [phone].

## 2018-10-30 NOTE — PROGRESS NOTES
Problem: Self Care Deficits Care Plan (Adult) Goal: *Acute Goals and Plan of Care (Insert Text) GOALS:  
DISCHARGE GOALS (in preparation for going home/rehab):  3 days 1. Ms. Abram Owusu will perform one lower body dressing activity with minimal assistance required to demonstrate improved functional mobility and safety. 2.  Ms. Abram Owusu will perform one lower body bathing activity with minimal assistance required to demonstrate improved functional mobility and safety. 3.  Ms. Abram Owusu will perform toileting/toilet transfer with contact guard assistance to demonstrate improved functional mobility and safety. 4.  Ms. Abram Owusu will perform shower transfer with contact guard assistance to demonstrate improved functional mobility and safety. JOINT CAMP OCCUPATIONAL THERAPY TKA: Initial Assessment 10/30/2018INPATIENT: Hospital Day: 1 [place] / [place] / Product Type: Medicare /  
  
NAME/AGE/GENDER: Bhumi Corbin is a 80 y.o. female PRIMARY DIAGNOSIS:  Primary osteoarthritis of left knee [M17.12] Procedure(s) and Anesthesia Type: 
   * LEFT KNEE ARTHROPLASTY TOTAL - Spinal (Left) ICD-10: Treatment Diagnosis:  
 · Pain in Left Knee (M25.562) · Stiffness of Left Knee, Not elsewhere classified (M25.662) ASSESSMENT:  
Ms. Abram Owusu is s/p left TKA and presents with decreased weight bearing on L LE and decreased independence with functional mobility and activities of daily living as compared to baseline level of function and safety. Patient would benefit from skilled Occupational Therapy to maximize independence and safety with self-care task and functional mobility. Pt would also benefit from education on adaptive equipment and safety precautions in preparation for going home or for recommendations for post-hospital rehab program.  
Last time plan was to go home but switched to rehab, plan this time is still rehab but a different facility. She was able to mobilize from bed to recliner using a RW. Initiate OT POC. This section established at most recent assessment PROBLEM LIST (Impairments causing functional limitations): 1. Decreased Strength 2. Decreased ADL/Functional Activities 3. Decreased Transfer Abilities 4. Increased Pain 5. Increased Fatigue 6. Decreased Flexibility/Joint Mobility 7. Decreased Knowledge of Precautions INTERVENTIONS PLANNED: (Benefits and precautions of occupational therapy have been discussed with the patient.) 1. Activities of daily living training 2. Adaptive equipment training 3. Balance training 4. Clothing management 5. Donning&doffing training 6. Theraputic activity TREATMENT PLAN: Frequency/Duration: Follow patient 1-2tx to address above goals. Rehabilitation Potential For Stated Goals: Excellent RECOMMENDED REHABILITATION/EQUIPMENT: (at time of discharge pending progress): Continue Skilled Therapy. OCCUPATIONAL PROFILE AND HISTORY:  
History of Present Injury/Illness (Reason for Referral): Pt presents this date s/p (left) TKA. Past Medical History/Comorbidities: Ms. Kevin Dyer  has a past medical history of Anemia, Heart murmur, HTN (hypertension), benign, Hypercholesteremia, Migraine, and Osteoarthritis. Ms. Kevin Dyer  has a past surgical history that includes hx hysterectomy; hx refractive surgery; hx back surgery; hx orthopaedic; hx tonsillectomy; endoscopy, colon, diagnostic (10/3/12); hx appendectomy; hx hip replacement (Left, 08/2018); and LEFT HIP ARTHROPLASTY TOTAL (Left, 8/7/2018). Social History/Living Environment:  
Home Environment: Private residence One/Two Story Residence: One story Living Alone: No 
Support Systems: Child(luciano) Patient Expects to be Discharged to[de-identified] Rehabilitation facility Current DME Used/Available at Home: Cane, straight Prior Level of Function/Work/Activity: L JANE in 8/2018. Lives alone, has family that lives near.  Independent prior, looking forward to getting back to IADl's and yardwork next year. Number of Personal Factors/Comorbidities that affect the Plan of Care: Brief history (0):  LOW COMPLEXITY ASSESSMENT OF OCCUPATIONAL PERFORMANCE[de-identified]  
Most Recent Physical Functioning:  
Balance Sitting: Intact Standing: With support Coordination Fine Motor Skills-Upper: Left Intact; Right Intact Gross Motor Skills-Upper: Left Intact; Right Intact Mental Status Neurologic State: Alert Orientation Level: Oriented X4 Basic ADLs (From Assessment) Complex ADLs (From Assessment) Basic ADL Feeding: Independent Oral Facial Hygiene/Grooming: Setup Bathing: Minimum assistance Upper Body Dressing: Setup Lower Body Dressing: Maximum assistance Toileting: Maximum assistance Grooming/Bathing/Dressing Activities of Daily Living Functional Transfers Bathroom Mobility: Minimum assistance Bed/Mat Mobility Supine to Sit: Contact guard assistance Sit to Stand: Minimum assistance Bed to Chair: Minimum assistance Physical Skills Involved: 1. Range of Motion 2. Balance 3. Strength 4. Activity Tolerance Cognitive Skills Affected (resulting in the inability to perform in a timely and safe manner): 1. wfl Psychosocial Skills Affected: 1. wfl Number of elements that affect the Plan of Care: 1-3:  LOW COMPLEXITY CLINICAL DECISION MAKIN [facility] [address] [id number] AM-PAC 6 Clicks Daily Activity Inpatient Short Form How much help from another person does the patient currently need. .. Total A Lot A Little None 1. Putting on and taking off regular lower body clothing? [] 1   [x] 2   [] 3   [] 4  
2. Bathing (including washing, rinsing, drying)? [] 1   [x] 2   [] 3   [] 4  
3. Toileting, which includes using toilet, bedpan or urinal?   [] 1   [x] 2   [] 3   [] 4  
4. Putting on and taking off regular upper body clothing? [] 1   [] 2   [] 3   [x] 4 5. Taking care of personal grooming such as brushing teeth? [] 1   [] 2   [] 3   [x] 4  
6. Eating meals? [] 1   [] 2   [] 3   [x] 4  
© 2007, Trustees of [facility] [employer], under license to [employer]. All rights reserved Score:  Initial: 18 Most Recent: X (Date: -- ) Interpretation of Tool:  Represents activities that are increasingly more difficult (i.e. Bed mobility, Transfers, Gait). Score 24 23 22-20 19-15 14-10 9-7 6 Modifier CH CI CJ CK CL CM CN   
 
? Self Care:  
  - CURRENT STATUS: CK - 40%-59% impaired, limited or restricted  - GOAL STATUS: CJ - 20%-39% impaired, limited or restricted  - D/C STATUS:  ---------------To be determined--------------- [place] / [place] / Product Type: Medicare /   
 
Medical Necessity:    
· Skilled intervention continues to be required due to deficits listed above. Reason for Services/Other Comments: 
· Patient continues to require skilled intervention due to new TKA. Use of outcome tool(s) and clinical judgement create a POC that gives a: MODERATE COMPLEXITY  
  
 
 
 
TREATMENT:  
(In addition to Assessment/Re-Assessment sessions the following treatments were rendered) Pre-treatment Symptoms/Complaints:   
Pain: Initial:  
Pain Intensity 1: 0  Post Session:  0 Assessment/Reassessment only, no treatment provided today Treatment/Session Assessment:   
 Response to Treatment:  Good, sitting up in recliner. Education: 
[] Home Exercises [x] Fall Precautions [] Hip Precautions [] Going Home Video [x] Knee/Hip Prosthesis Review [x] Walker Management/Safety [x] Adaptive Equipment as Needed Interdisciplinary Collaboration:  
o Physical Therapist 
o Occupational Therapist 
o Registered Nurse After treatment position/precautions:  
o Up in chair 
o Bed/Chair-wheels locked 
o Caregiver at bedside 
o Call light within reach 
o RN notified Compliance with Program/Exercises: Compliant all of the time. Recommendations/Intent for next treatment session:  Treatment next visit will focus on increasing Ms. Dye's independence with bed mobility, transfers, self care, functional mobility, modalities for pain, and patient education. Total Treatment Duration: OT Patient Time In/Time Out Time In: 1400 Time Out: 1410 Jeremie Doyle OT

## 2018-10-30 NOTE — PROGRESS NOTES
's pre-op visit in the block area and prayer with patient as requested. Charlie Masterson MDiv, BS Board Certified [employer]

## 2018-10-30 NOTE — PERIOP NOTES
TRANSFER - OUT REPORT: 
 
Verbal report given to [address] RN on Ashkan Lopez  being transferred to [facility] for routine progression of care Report consisted of patients Situation, Background, Assessment and  
Recommendations(SBAR). Information from the following report(s) SBAR, MAR and Recent Results was reviewed with the receiving nurse. Lines:  
Peripheral IV 10/30/18 Right Hand (Active) Site Assessment Clean, dry, & intact 10/30/2018  8:29 AM  
Phlebitis Assessment 0 10/30/2018  8:29 AM  
Infiltration Assessment 0 10/30/2018  8:29 AM  
Dressing Status Clean, dry, & intact 10/30/2018  8:29 AM  
Dressing Type Transparent;Tape 10/30/2018  8:29 AM  
Hub Color/Line Status Pink; Infusing 10/30/2018  8:29 AM  
Action Taken Blood drawn 10/30/2018  8:29 AM  
  
 
Opportunity for questions and clarification was provided. Patient transported with: 
 [employer]

## 2018-10-30 NOTE — PROGRESS NOTES
Care Management Interventions Mode of Transport at Discharge: Self Transition of Care Consult (CM Consult): SNF Partner SNF: No 
Reason Why Partner SNF Not Chosen: Location, Positive previous encounter Physical Therapy Consult: Yes Occupational Therapy Consult: Yes Current Support Network: Own Home Confirm Follow Up Transport: Family Plan discussed with Pt/Family/Caregiver: Yes Freedom of Choice Offered: Yes Discharge Location Discharge Placement: Skilled nursing facility Patient is an 80 yr old female admitted for a left TKA. She reports she has made arrangements to go to [facility] for rehab on d/c. Referral sent and approval obtained for admission on Friday 11-2 after her three night Inpt stay Will follow. [place]

## 2018-10-30 NOTE — PROGRESS NOTES
Emily Reveles, RN Registered Nurse Signed Patient Learning Consults   Date of Service: 2017 11:07 AM Note Created: 2017 11:05 AM     Consult Orders:     [facility] IP Consult [[id number]] ordered by Vitaliy Becerril MD at 03/30/17 1300     Patient [facility] IP Consult [[id number]] ordered by Vitaliy Becerril MD at 03/30/17 1336             []Hide copied text  []Hover for attribution information  Lety attended infant CPR for her twin sons. She expressed that she had been recently certified in CPR but that she was not current at this time. She was attentive and participated in the demonstration and TB of both infant CPR and the process for both conscious and unconscious infant when choking. We reviewed over the safety information in the packet which she was able to take with her when she left.                          TRANSFER - IN REPORT: 
 
Verbal report received from Tristen Duncan rn(name) on [facility]  being received from [place]) for routine progression of care Report consisted of patients Situation, Background, Assessment and  
Recommendations(SBAR). Information from the following report(s) SBAR, Procedure Summary, Intake/Output, MAR and Recent Results was reviewed with the receiving nurse. Opportunity for questions and clarification was provided. Assessment completed upon patients arrival to unit and care assumed.

## 2018-10-30 NOTE — PROGRESS NOTES
Problem: Mobility Impaired (Adult and Pediatric) Goal: *Acute Goals and Plan of Care (Insert Text) GOALS (1-4 days): 
(1.)Ms. Arsenio De La Garza will move from supine to sit and sit to supine  in bed with CONTACT GUARD ASSIST. 
(2.)Ms. Arsenio De La Garza will transfer from bed to chair and chair to bed with CONTACT GUARD ASSIST using the least restrictive device. (3.)Ms. Arsenio De La Garza will ambulate with CONTACT GUARD ASSIST for 100 feet with the least restrictive device. (4.)Ms. Arsenio De La Garza will ambulate up/down 4 steps with bilateral  railing with CONTACT GUARD ASSIST with no device. (5.)Ms. rAsenio De La Garza will increase left knee ROM to 5°-80°.  
________________________________________________________________________________________________ PHYSICAL THERAPY [facility] camp tKa: Initial Assessment, PM 10/30/2018INPATIENT: Hospital Day: 1 [place] / [place] / Product Type: Medicare /  
  
NAME/AGE/GENDER: Ezequiel Mason is a 80 y.o. female PRIMARY DIAGNOSIS:  Primary osteoarthritis of left knee [M17.12] Procedure(s) and Anesthesia Type: 
   * LEFT KNEE ARTHROPLASTY TOTAL - Spinal (Left) ICD-10: Treatment Diagnosis:  
 · Pain in Left Knee (M25.562) · Stiffness of Left Knee, Not elsewhere classified (M25.662) · Other abnormalities of gait and mobility (R26.89) ASSESSMENT:  
Ms. Arsenio De La Garza presents S/P L TKA and demonstrates a decline in her premorbid level of function. She presents with decreased L LE strength and ROM, standing balance, functional mobility and TKA awareness. She plans on going to rehab at [place]. Looking at [facility] this time. She would benefit from further PT while here to address these deficits. She is still a little numb from spinal. Able to transfer to recliner and perform exs. This section established at most recent assessment PROBLEM LIST (Impairments causing functional limitations): 1. Decreased Strength 2. Decreased ADL/Functional Activities 3. Decreased Transfer Abilities 4. Decreased Ambulation Ability/Technique 5. Decreased Balance 6. Increased Pain 7. Decreased Activity Tolerance 8. Increased Fatigue 9. Decreased Flexibility/Joint Mobility 10. Decreased Knowledge of Precautions 11. Decreased [place] with Home Exercise Program 
 INTERVENTIONS PLANNED: (Benefits and precautions of physical therapy have been discussed with the patient.) 1. Balance Exercise 2. Bed Mobility 3. Cold 4. Gait Training 5. Home Exercise Program (HEP) 6. Therapeutic Exercise/Strengthening 7. Transfer Training 8. TKA education 9. Range of Motion: active/assisted/passive 10. Therapeutic Activities 11. Group Therapy TREATMENT PLAN: Frequency/Duration: Follow patient BID for duration of hospital stay to address above goals. Rehabilitation Potential For Stated Goals: Good RECOMMENDED REHABILITATION/EQUIPMENT: (at time of discharge pending progress): Continue Skilled Therapy and Home Health: Physical Therapy. HISTORY:  
History of Present Injury/Illness (Reason for Referral): S/P L TKA Past Medical History/Comorbidities: Ms. Shanika Juarez  has a past medical history of Anemia, Heart murmur, HTN (hypertension), benign, Hypercholesteremia, Migraine, and Osteoarthritis. Ms. Shanika Juarez  has a past surgical history that includes hx hysterectomy; hx refractive surgery; hx back surgery; hx orthopaedic; hx tonsillectomy; endoscopy, colon, diagnostic (10/3/12); hx appendectomy; hx hip replacement (Left, 08/2018); and LEFT HIP ARTHROPLASTY TOTAL (Left, 8/7/2018). Social History/Living Environment:  
Home Environment: Private residence # Steps to Enter: 0 One/Two Story Residence: One story Living Alone: Yes Support Systems: Child(luciano) Patient Expects to be Discharged to[de-identified] Rehabilitation facility([place]) Current DME Used/Available at Home: Cane, straight, Tub transfer bench, Walker, rolling, Commode, bedside Tub or Shower Type: Tub/Shower combination Prior Level of Function/Work/Activity: 
Functionally I with ADLs and amb. Lives alone Number of Personal Factors/Comorbidities that affect the Plan of Care: 1-2: MODERATE COMPLEXITY EXAMINATION:  
Most Recent Physical Functioning:  
  
Gross Assessment AROM: Within functional limits(R LE) Strength: Generally decreased, functional(R LE 3+/5) Sensation: Intact(R LE) LLE AROM 
L Knee Flexion: 60 L Knee Extension: 20 LLE Strength L Hip Flexion: 2+ L Knee Extension: 2+ 
L Ankle Dorsiflexion: 2+ Bed Mobility Supine to Sit: Minimum assistance Transfers Sit to Stand: Minimum assistance Stand to Sit: Minimum assistance Stand Pivot Transfers: Minimum assistance(x2) Bed to Chair: Minimum assistance Balance Sitting: Intact Standing: With support Weight Bearing Status Left Side Weight Bearing: As tolerated Distance (ft): 4 Feet (ft) Ambulation - Level of Assistance: Minimal assistance;Assist x2 Assistive Device: Walker, rolling Base of Support: Narrowed Speed/Rashmi: Shuffled; Slow Step Length: Right shortened Stance: Left decreased Gait Abnormalities: Antalgic; Step to gait Interventions: Safety awareness training; Tactile cues; Verbal cues; Visual/Demos Braces/Orthotics:  
 
   
  
Body Structures Involved: 1. Bones 2. Joints 3. Muscles Body Functions Affected: 1. Neuromusculoskeletal 
2. Movement Related Activities and Participation Affected: 1. General Tasks and Demands 2. Mobility 3. Self Care Number of elements that affect the Plan of Care: 4+: HIGH COMPLEXITY CLINICAL PRESENTATION:  
Presentation: Stable and uncomplicated: LOW COMPLEXITY CLINICAL DECISION MAKIN [facility] [address] [id number] AM-PAC 6 Clicks Basic Mobility Inpatient Short Form How much difficulty does the patient currently have. .. Unable A Lot A Little None 1.   Turning over in bed (including adjusting bedclothes, sheets and blankets)? [] 1   [] 2   [x] 3   [] 4  
2. Sitting down on and standing up from a chair with arms ( e.g., wheelchair, bedside commode, etc.)   [] 1   [x] 2   [] 3   [] 4  
3. Moving from lying on back to sitting on the side of the bed? [] 1   [] 2   [x] 3   [] 4 How much help from another person does the patient currently need. .. Total A Lot A Little None 4. Moving to and from a bed to a chair (including a wheelchair)? [] 1   [x] 2   [] 3   [] 4  
5. Need to walk in hospital room? [] 1   [x] 2   [] 3   [] 4  
6. Climbing 3-5 steps with a railing? [x] 1   [] 2   [] 3   [] 4  
© 2007, Trustees of [address] [place] [id number], under license to [employer]. All rights reserved Score:  Initial: 13 Most Recent: X (Date: -- ) Interpretation of Tool:  Represents activities that are increasingly more difficult (i.e. Bed mobility, Transfers, Gait). Score 24 23 22-20 19-15 14-10 9-7 6 Modifier CH CI CJ CK CL CM CN   
 
? Mobility - Walking and Moving Around:  
  - CURRENT STATUS: CL - 60%-79% impaired, limited or restricted  - GOAL STATUS: CK - 40%-59% impaired, limited or restricted  - D/C STATUS:  ---------------To be determined--------------- [place] / [place] B / Product Type: Medicare /   
 
Medical Necessity:    
· Patient demonstrates fair rehab potential due to higher previous functional level. Reason for Services/Other Comments: 
· Patient continues to require present interventions due to patient's inability to perform functional mobility at a CGA level. Use of outcome tool(s) and clinical judgement create a POC that gives a: Clear prediction of patient's progress: LOW COMPLEXITY  
  
 
 
 
TREATMENT:  
(In addition to Assessment/Re-Assessment sessions the following treatments were rendered) Pre-treatment Symptoms/Complaints:  Minimal pain. Still feels a little numb from spinal 
Pain: Initial:  
Pain Intensity 1: 3 Pain Location 1: Knee Pain Orientation 1: Left Pain Intervention(s) 1: Ambulation/Increased Activity, Cold pack, Elevation, Exercise, Repositioned  Post Session:  2, applied cryocuff Therapeutic Exercise: (10 Minutes):  Exercises per grid below to improve mobility, strength and coordination. Required minimal visual, verbal and tactile cues to promote proper body alignment and promote proper body breathing techniques. Progressed range, repetitions and complexity of movement as indicated. Assessment: 10 minutes Date: 
10/30/18 Date: 
 Date: 
  
ACTIVITY/EXERCISE AM PM AM PM AM PM  
GROUP THERAPY  []  []  []  []  []  [] Ankle Pumps  10 Quad Sets  10 Gluteal Sets  10 Hip ABd/ADduction  10AA Straight Leg Raises  10AA Knee Slides  10AA Short Arc Quads  10AA 812 [facility] Chair Slides B = bilateral; AA = active assistive; A = active; P = passive Treatment/Session Assessment:   
 Response to Treatment:  Tolerated well. Education: 
[x] Home Exercises [x] Fall Precautions [] Hip Precautions [] D/C Instruction Review 
[] Knee/Hip Prosthesis Review [x] Walker Management/Safety [] Adaptive Equipment as Needed Interdisciplinary Collaboration:  
o Physical Therapist 
o Occupational Therapist 
o Registered Nurse After treatment position/precautions:  
o Up in chair 
o Bed/Chair-wheels locked 
o Call light within reach 
o RN notified 
o Family at bedside Compliance with Program/Exercises: Will assess as treatment progresses. Recommendations/Intent for next treatment session:  Treatment next visit will focus on increasing Ms. Dye's independence with bed mobility, transfers, gait training, strength/ROM exercises, modalities for pain, and patient education. Total Treatment Duration: PT Patient Time In/Time Out Time In: 1340 Time Out: 1400 Vijay Morales PT

## 2018-10-30 NOTE — PERIOP NOTES
TRANSFER - OUT REPORT: 
 
Verbal report given to Paulette Horvath RN(name) on Tristen Wayne being transferred to [facility](unit) for routine progression of care Report consisted of patient's Situation, Background, Assessment and  
Recommendations(SBAR). Information from the following report(s) OR Summary, Procedure Summary, Intake/Output and MAR was reviewed with the receiving nurse. Opportunity for questions and clarification was provided. Patient transported with: 
 [employer]

## 2018-10-30 NOTE — OP NOTES
[facility]  Total Knee Arthroplasty  Patient:Jacquelin Saucedo   : 1928  Medical Record Number:[id number]      Pre-operative Diagnosis:  Primary osteoarthritis of left knee [M17.12]  Post-operative Diagnosis: Primary osteoarthritis of left knee [M17.12]  Location: [place] [address]    Date of Procedure: 10/30/2018  Surgeon: Uma Gamez MD  Assistant:  CEDRICK Mayo    Anesthesia: Spinal and nerve block    Procedure:  Left Posterior Stabilized Total Knee Arthroplasty with use of Bone Cement    Tourniquet Time: none    EBL:   200cc     The complexity of the total joint surgery requires the use of a first assistant for positioning, retraction and assistance in closure. Cee Barry was brought to the operating room, positioned on the operating room table, and after appropriate identification  was anethestized. A scott catheter was placed preoperatively and IV antibiotics were administered, along with IV transexamic acid. The limb was prepped and draped in the usual sterile fashion. Prior to the incision being made a timeout was called identifying the patient, procedure ,operative side and surgeon. An anterior longitudinal incision was accomplished just medial to the tibial tubercle and extending approximal 6 centimeters proximal to the superior pole of the patella. A medial parapatellar capsular incision was performed. The medial capsular flap was elevated around to the insertion of the semimembranous tendon. The patella was everted and the knee flexed and externally rotated. The articular surface revealed cartilage loss with exposed bone and bone spurs throughout all three compartments. The medial and lateral menisci were excised. The lateral half of the fat pad excised and the patella femoral ligament was released. The anterior cruciate ligament and the posterior cruciate ligament were resected.   Using extramedullary instrumentation, the tibial cut was accomplished with appropriate posterior slope. Approximately 6 mm of bone was removed from the high side of the tibia. The distal femur was addressed next. A drill hole was made above the intracondylar notch. Using appropriate intramedullary instrumentation, an appropriate valgus distal cut was accomplished. The femur was sized to a 5 component. The anterior and posterior cuts and anterior and posterior chamfer cuts were then made about the distal femur. Osteophytes were removed from the tibial and femoral surfaces. The appropriate cutting blocks was then utilized to perform the notch cut, with appropriate lateral translation accomplished for the patellofemoral groove. The tibia was sized to a 6 component. The tibial base plate was pinned into place with  appropriate external rotation and the stem site prepared. A preliminary range of motion was accomplished with the above size trial components. A 13 millimeter polyethylene insert allowed the patient to obtain full extension as well as appropriate flexion. Additional surgical releases were none. .  The patient's ligaments were stable in flexion and extension to medial and lateral stressing and alignment was through the appropriate mechanical axis. The patella was then everted. The bone was resected to accomodate a size 33 patella button. A trial reduction revealed appropriate tracking through the patellofemoral groove with no lateral retinacular release accomplished. All trial components were removed and the surfaces were prepared for cementing with irrigation and debridement of the bone interstices. The posterior capsule and periosteum and soft tissues were injected for postop pain management. One package of cement  was mixed and the permanent components cemented into place. The femoral and tibial components were pressurized in full extension as well as 70 degrees of flexion. The patella component was pressurized using the patella clamp. Excess cement was removed using a curette. Once the cement was hardened, the patella clamp was removed and the knee was copiously irrigated. A lavage of diluted betadine solution of 17.5 ml Betadine in 500 of 0.9% Normal Saline was allowed to soak in the wound for 3 minutes after implanting of the prosthesis. The wound was irrigated with Saline again before closure. The joint and skin areas were sprinkled with 1 gm vancomycin powder. Prior to the final skin closure, full strength betadine was applied to the skin surrounding the skin incision. Linda Belford knee was placed through a range of motion and noted to be stable as mentioned above with the trial components. The operative knee was injected prior to closure for post op pain management. The capsular layer was closed using a #1 PDS suture, while the subcutaneous layers were closed using a 2-0 Monocryl interrupted suture. The skin was closed using staples and a sterile bandage was applied. A cryo pad was applied on the operative leg. The sponge count and needle counts were correct. Implants:   Implant Name Type Inv.  Item Serial No.  Lot No. LRB No. Used   CEMENT BNE HV R 40GM -- [employer] R [id number] - [id number]  CEMENT BNE HV R 40GM -- Steven Breanna [id number] [facility] 90 [id number] Left 1     Signed By: Patricia Paiz MD

## 2018-10-30 NOTE — PERIOP NOTES
Teach back method used in review of Incentive Spirometer (reached 1500 preop), Hibiclens usage preop, TB screening and pain management goals.

## 2018-10-31 LAB
HGB BLD-MCNC: 9.5 G/DL (ref 11.7–15.4)
MM INDURATION POC: NORMAL MM (ref 0–5)
PPD POC: NORMAL NEGATIVE

## 2018-10-31 PROCEDURE — 74011250637 HC RX REV CODE- 250/637: Performed by: PHYSICIAN ASSISTANT

## 2018-10-31 PROCEDURE — 97535 SELF CARE MNGMENT TRAINING: CPT

## 2018-10-31 PROCEDURE — 97150 GROUP THERAPEUTIC PROCEDURES: CPT

## 2018-10-31 PROCEDURE — 36415 COLL VENOUS BLD VENIPUNCTURE: CPT

## 2018-10-31 PROCEDURE — 97116 GAIT TRAINING THERAPY: CPT

## 2018-10-31 PROCEDURE — 85018 HEMOGLOBIN: CPT

## 2018-10-31 PROCEDURE — 74011250636 HC RX REV CODE- 250/636: Performed by: PHYSICIAN ASSISTANT

## 2018-10-31 PROCEDURE — 97110 THERAPEUTIC EXERCISES: CPT

## 2018-10-31 PROCEDURE — 74011250637 HC RX REV CODE- 250/637: Performed by: ORTHOPAEDIC SURGERY

## 2018-10-31 PROCEDURE — 65270000029 HC RM PRIVATE

## 2018-10-31 PROCEDURE — 99221 1ST HOSP IP/OBS SF/LOW 40: CPT | Performed by: PHYSICAL MEDICINE & REHABILITATION

## 2018-10-31 RX ORDER — CALCIUM CARBONATE 200(500)MG
200 TABLET,CHEWABLE ORAL
Status: DISCONTINUED | OUTPATIENT
Start: 2018-10-31 | End: 2018-11-02 | Stop reason: HOSPADM

## 2018-10-31 RX ORDER — HYDROCODONE BITARTRATE AND ACETAMINOPHEN 5; 325 MG/1; MG/1
1 TABLET ORAL
Status: DISCONTINUED | OUTPATIENT
Start: 2018-10-31 | End: 2018-11-02 | Stop reason: HOSPADM

## 2018-10-31 RX ADMIN — ACETAMINOPHEN 1000 MG: 500 TABLET, FILM COATED ORAL at 05:25

## 2018-10-31 RX ADMIN — Medication 1 AMPULE: at 09:02

## 2018-10-31 RX ADMIN — CELECOXIB 200 MG: 200 CAPSULE ORAL at 09:02

## 2018-10-31 RX ADMIN — SENNOSIDES AND DOCUSATE SODIUM 2 TABLET: 8.6; 5 TABLET ORAL at 09:02

## 2018-10-31 RX ADMIN — CELECOXIB 200 MG: 200 CAPSULE ORAL at 21:22

## 2018-10-31 RX ADMIN — Medication 2 G: at 01:25

## 2018-10-31 RX ADMIN — OXYCODONE HYDROCHLORIDE 5 MG: 5 TABLET ORAL at 09:03

## 2018-10-31 RX ADMIN — Medication 10 ML: at 14:00

## 2018-10-31 RX ADMIN — ASPIRIN 81 MG: 81 TABLET, DELAYED RELEASE ORAL at 09:02

## 2018-10-31 RX ADMIN — ACETAMINOPHEN 1000 MG: 500 TABLET, FILM COATED ORAL at 12:05

## 2018-10-31 RX ADMIN — ONDANSETRON 4 MG: 8 TABLET, ORALLY DISINTEGRATING ORAL at 21:22

## 2018-10-31 RX ADMIN — OXYCODONE HYDROCHLORIDE 5 MG: 5 TABLET ORAL at 05:25

## 2018-10-31 RX ADMIN — CALCIUM CARBONATE 200 MG: 500 TABLET, CHEWABLE ORAL at 17:00

## 2018-10-31 RX ADMIN — OXYCODONE HYDROCHLORIDE 5 MG: 5 TABLET ORAL at 12:08

## 2018-10-31 RX ADMIN — HYDROCODONE BITARTRATE AND ACETAMINOPHEN 1 TABLET: 5; 325 TABLET ORAL at 21:22

## 2018-10-31 RX ADMIN — CARVEDILOL 6.25 MG: 6.25 TABLET, FILM COATED ORAL at 09:03

## 2018-10-31 RX ADMIN — ASPIRIN 81 MG: 81 TABLET, DELAYED RELEASE ORAL at 21:22

## 2018-10-31 RX ADMIN — Medication 1 AMPULE: at 21:25

## 2018-10-31 NOTE — PROGRESS NOTES
Problem: Mobility Impaired (Adult and Pediatric) Goal: *Acute Goals and Plan of Care (Insert Text) GOALS (1-4 days): 
(1.)Ms. Arsenio De La Garza will move from supine to sit and sit to supine  in bed with CONTACT GUARD ASSIST. 
(2.)Ms. Arsenio De La Garza will transfer from bed to chair and chair to bed with CONTACT GUARD ASSIST using the least restrictive device. MET 10/31/18 (3.)Ms. Arsenio De La Garza will ambulate with CONTACT GUARD ASSIST for 100 feet with the least restrictive device. MET 10/31/18 (4.)Ms. Arsenio De La Garza will ambulate up/down 4 steps with bilateral  railing with CONTACT GUARD ASSIST with no device. (5.)Ms. Arsenio De La Garza will increase left knee ROM to 5°-80°.  
________________________________________________________________________________________________ PHYSICAL THERAPY [facility] tKa: Daily Note, PM 10/31/2018INPATIENT: Hospital Day: 2 [place] / Product Type: Medicare /  
  
NAME/AGE/GENDER: Ezequiel Mason is a 80 y.o. female PRIMARY DIAGNOSIS:  Primary osteoarthritis of left knee [M17.12] Procedure(s) and Anesthesia Type: 
   * LEFT KNEE ARTHROPLASTY TOTAL - Spinal (Left) ICD-10: Treatment Diagnosis:  
 · Pain in Left Knee (M25.562) · Stiffness of Left Knee, Not elsewhere classified (M25.662) · Other abnormalities of gait and mobility (R26.89) ASSESSMENT:  
Ms. Arsenio De La Garza is sitting up in chair on arrival.  She is agreeable to PT and states she ate too much lunch. Pt began to vomit at end of therapy session, but did not want anything for nausea. Pt left sitting up in chair with needs in reach. This section established at most recent assessment PROBLEM LIST (Impairments causing functional limitations): 1. Decreased Strength 2. Decreased ADL/Functional Activities 3. Decreased Transfer Abilities 4. Decreased Ambulation Ability/Technique 5. Decreased Balance 6. Increased Pain 7. Decreased Activity Tolerance 8. Increased Fatigue 9. Decreased Flexibility/Joint Mobility 10. Decreased Knowledge of Precautions 11. Decreased [place] with Home Exercise Program 
 INTERVENTIONS PLANNED: (Benefits and precautions of physical therapy have been discussed with the patient.) 1. Balance Exercise 2. Bed Mobility 3. Cold 4. Gait Training 5. Home Exercise Program (HEP) 6. Therapeutic Exercise/Strengthening 7. Transfer Training 8. TKA education 9. Range of Motion: active/assisted/passive 10. Therapeutic Activities 11. Group Therapy TREATMENT PLAN: Frequency/Duration: Follow patient BID for duration of hospital stay to address above goals. Rehabilitation Potential For Stated Goals: Good RECOMMENDED REHABILITATION/EQUIPMENT: (at time of discharge pending progress): Continue Skilled Therapy and Home Health: Physical Therapy. HISTORY:  
History of Present Injury/Illness (Reason for Referral): S/P L TKA Past Medical History/Comorbidities: Ms. Maximo Hagan  has a past medical history of Anemia, Heart murmur, HTN (hypertension), benign, Hypercholesteremia, Migraine, and Osteoarthritis. Ms. Maximo Hagan  has a past surgical history that includes hx hysterectomy; hx refractive surgery; hx back surgery; hx orthopaedic; hx tonsillectomy; endoscopy, colon, diagnostic (10/3/12); hx appendectomy; hx hip replacement (Left, 08/2018); LEFT KNEE ARTHROPLASTY TOTAL (Left, 10/30/2018); and LEFT HIP ARTHROPLASTY TOTAL (Left, 8/7/2018). Social History/Living Environment:  
Home Environment: Private residence # Steps to Enter: 0 One/Two Story Residence: One story Living Alone: Yes Support Systems: Child(luciano) Patient Expects to be Discharged to[de-identified] Rehabilitation facility([employer]) Current DME Used/Available at Home: Cane, straight, Tub transfer bench, Walker, rolling, Commode, bedside Tub or Shower Type: Tub/Shower combination Prior Level of Function/Work/Activity: 
Functionally I with ADLs and amb. Lives alone Number of Personal Factors/Comorbidities that affect the Plan of Care: 1-2: MODERATE COMPLEXITY EXAMINATION:  
Most Recent Physical Functioning: LLE AROM 
L Knee Flexion: 100 L Knee Extension: 10 Bed Mobility Supine to Sit: Supervision Transfers Sit to Stand: Stand-by assistance Stand to Sit: Stand-by assistance Bed to Chair: Stand-by assistance Weight Bearing Status Left Side Weight Bearing: As tolerated Distance (ft): 135 Feet (ft) Ambulation - Level of Assistance: Stand-by assistance Assistive Device: Walker, rolling Base of Support: Narrowed Speed/Rashmi: Delayed;Pace decreased (<100 feet/min) Step Length: Left shortened;Right shortened Stance: Left decreased Gait Abnormalities: Antalgic;Decreased step clearance Interventions: Safety awareness training;Verbal cues Braces/Orthotics:  
 
Left Knee Cold Type: Cryocuff Body Structures Involved: 1. Bones 2. Joints 3. Muscles Body Functions Affected: 1. Neuromusculoskeletal 
2. Movement Related Activities and Participation Affected: 1. General Tasks and Demands 2. Mobility 3. Self Care Number of elements that affect the Plan of Care: 4+: HIGH COMPLEXITY CLINICAL PRESENTATION:  
Presentation: Stable and uncomplicated: LOW COMPLEXITY CLINICAL DECISION MAKIN [place] [address] AM-PAC 6 Clicks Basic Mobility Inpatient Short Form How much difficulty does the patient currently have. .. Unable A Lot A Little None 1. Turning over in bed (including adjusting bedclothes, sheets and blankets)? [] 1   [] 2   [x] 3   [] 4  
2. Sitting down on and standing up from a chair with arms ( e.g., wheelchair, bedside commode, etc.)   [] 1   [x] 2   [] 3   [] 4  
3. Moving from lying on back to sitting on the side of the bed? [] 1   [] 2   [x] 3   [] 4 How much help from another person does the patient currently need. .. Total A Lot A Little None 4. Moving to and from a bed to a chair (including a wheelchair)? [] 1   [x] 2   [] 3   [] 4  
5. Need to walk in hospital room? [] 1   [x] 2   [] 3   [] 4  
6. Climbing 3-5 steps with a railing? [x] 1   [] 2   [] 3   [] 4  
© 2007, Trustees of [facility] [employer], under license to [employer]. All rights reserved Score:  Initial: 13 Most Recent: X (Date: -- ) Interpretation of Tool:  Represents activities that are increasingly more difficult (i.e. Bed mobility, Transfers, Gait). Score 24 23 22-20 19-15 14-10 9-7 6 Modifier CH CI CJ CK CL CM CN   
 
? Mobility - Walking and Moving Around:  
  - CURRENT STATUS: CL - 60%-79% impaired, limited or restricted  - GOAL STATUS: CK - 40%-59% impaired, limited or restricted  - D/C STATUS:  ---------------To be determined--------------- [place] / [place] / Product Type: Medicare /   
 
Medical Necessity:    
· Patient demonstrates fair rehab potential due to higher previous functional level. Reason for Services/Other Comments: 
· Patient continues to require present interventions due to patient's inability to perform functional mobility at a CGA level. Use of outcome tool(s) and clinical judgement create a POC that gives a: Clear prediction of patient's progress: LOW COMPLEXITY  
  
 
 
 
TREATMENT:  
(In addition to Assessment/Re-Assessment sessions the following treatments were rendered) Pre-treatment Symptoms/Complaints:  Minimal complaints of pain 
Pain: Initial:  
   Post Session:  Not rated Therapeutic Exercise: (45 Minutes(group )):  Exercises per grid below to improve mobility, strength and coordination. Required minimal visual, verbal and tactile cues to promote proper body alignment and promote proper body breathing techniques. Progressed range, repetitions and complexity of movement as indicated.  
Gait Training (15 Minutes):  Gait training to improve and/or restore physical functioning as related to mobility, strength and balance. Ambulated 135 Feet (ft) with Stand-by assistance using a Walker, rolling and minimal Safety awareness training;Verbal cues related to their stance phase and stride length to promote proper body alignment, promote proper body posture and promote proper body mechanics. Date: 
10/30/18 Date: 
10/31/18 Date: 
  
ACTIVITY/EXERCISE AM PM AM PM AM PM  
GROUP THERAPY  []  []  []  [x]  []  [] Ankle Pumps  10 10 15 Quad Sets  10 10 15 Gluteal Sets  10 10 15 Hip ABd/ADduction  10AA 10 15 Straight Leg Raises  10AA 10 15 Knee Slides  10AA 10 15 Short Arc Quads  10AA  15 [address] [facility] Chair Slides    15    
        
B = bilateral; AA = active assistive; A = active; P = passive Treatment/Session Assessment:   
 Response to Treatment:  Doing well Education: 
[x] Home Exercises [x] Fall Precautions [] Hip Precautions [] D/C Instruction Review 
[] Knee/Hip Prosthesis Review [x] Walker Management/Safety [] Adaptive Equipment as Needed Interdisciplinary Collaboration:  
o Registered Nurse After treatment position/precautions:  
o Up in chair 
o Bed/Chair-wheels locked 
o Call light within reach 
o RN notified Compliance with Program/Exercises: Will assess as treatment progresses. Recommendations/Intent for next treatment session:  Treatment next visit will focus on increasing Ms. Dye's independence with bed mobility, transfers, gait training, strength/ROM exercises, modalities for pain, and patient education. Total Treatment Duration: PT Patient Time In/Time Out Time In: 1300 Time Out: 1400 Yessenia Simms PTA

## 2018-10-31 NOTE — PROGRESS NOTES
Problem: Mobility Impaired (Adult and Pediatric) Goal: *Acute Goals and Plan of Care (Insert Text) GOALS (1-4 days): 
(1.)Ms. Gabriel Higgins will move from supine to sit and sit to supine  in bed with CONTACT GUARD ASSIST. 
(2.)Ms. Gabriel Higgins will transfer from bed to chair and chair to bed with CONTACT GUARD ASSIST using the least restrictive device. MET 10/31/18 (3.)Ms. Gabriel Higgins will ambulate with CONTACT GUARD ASSIST for 100 feet with the least restrictive device. MET 10/31/18 (4.)Ms. Gabriel Higgins will ambulate up/down 4 steps with bilateral  railing with CONTACT GUARD ASSIST with no device. (5.)Ms. Gabriel Higgins will increase left knee ROM to 5°-80°.  
________________________________________________________________________________________________ PHYSICAL THERAPY [facility] tKa: Daily Note, AM 10/31/2018INPATIENT: Hospital Day: 2 [place] / Product Type: Medicare /  
  
NAME/AGE/GENDER: Halle Long is a 80 y.o. female PRIMARY DIAGNOSIS:  Primary osteoarthritis of left knee [M17.12] Procedure(s) and Anesthesia Type: 
   * LEFT KNEE ARTHROPLASTY TOTAL - Spinal (Left) ICD-10: Treatment Diagnosis:  
 · Pain in Left Knee (M25.562) · Stiffness of Left Knee, Not elsewhere classified (M25.662) · Other abnormalities of gait and mobility (R26.89) ASSESSMENT:  
Ms. Gabriel Higgins is sitting up in chair on arrival.  She is agreeable to PT and plans to go to rehab at discharge. Pt is doing well and is motivated to participate. This section established at most recent assessment PROBLEM LIST (Impairments causing functional limitations): 1. Decreased Strength 2. Decreased ADL/Functional Activities 3. Decreased Transfer Abilities 4. Decreased Ambulation Ability/Technique 5. Decreased Balance 6. Increased Pain 7. Decreased Activity Tolerance 8. Increased Fatigue 9. Decreased Flexibility/Joint Mobility 10. Decreased Knowledge of Precautions 11. Decreased [place] with Home Exercise Program 
 INTERVENTIONS PLANNED: (Benefits and precautions of physical therapy have been discussed with the patient.) 1. Balance Exercise 2. Bed Mobility 3. Cold 4. Gait Training 5. Home Exercise Program (HEP) 6. Therapeutic Exercise/Strengthening 7. Transfer Training 8. TKA education 9. Range of Motion: active/assisted/passive 10. Therapeutic Activities 11. Group Therapy TREATMENT PLAN: Frequency/Duration: Follow patient BID for duration of hospital stay to address above goals. Rehabilitation Potential For Stated Goals: Good RECOMMENDED REHABILITATION/EQUIPMENT: (at time of discharge pending progress): Continue Skilled Therapy and Home Health: Physical Therapy. HISTORY:  
History of Present Injury/Illness (Reason for Referral): S/P L TKA Past Medical History/Comorbidities: Ms. Gurinder Day  has a past medical history of Anemia, Heart murmur, HTN (hypertension), benign, Hypercholesteremia, Migraine, and Osteoarthritis. Ms. Gurinder Day  has a past surgical history that includes hx hysterectomy; hx refractive surgery; hx back surgery; hx orthopaedic; hx tonsillectomy; endoscopy, colon, diagnostic (10/3/12); hx appendectomy; hx hip replacement (Left, 08/2018); LEFT KNEE ARTHROPLASTY TOTAL (Left, 10/30/2018); and LEFT HIP ARTHROPLASTY TOTAL (Left, 8/7/2018). Social History/Living Environment:  
Home Environment: Private residence # Steps to Enter: 0 One/Two Story Residence: One story Living Alone: Yes Support Systems: Child(luciano) Patient Expects to be Discharged to[de-identified] Rehabilitation facility([place]) Current DME Used/Available at Home: Cane, straight, Tub transfer bench, Walker, rolling, Commode, bedside Tub or Shower Type: Tub/Shower combination Prior Level of Function/Work/Activity: 
Functionally I with ADLs and amb. Lives alone Number of Personal Factors/Comorbidities that affect the Plan of Care: 1-2: MODERATE COMPLEXITY EXAMINATION:  
Most Recent Physical Functioning:  
  
  
 
         
 
  
 
Bed Mobility Supine to Sit: Supervision Transfers Sit to Stand: Stand-by assistance Stand to Sit: Stand-by assistance Bed to Chair: Stand-by assistance Weight Bearing Status Left Side Weight Bearing: As tolerated Distance (ft): 120 Feet (ft) Ambulation - Level of Assistance: Stand-by assistance;Contact guard assistance Assistive Device: Walker, rolling Base of Support: Narrowed Speed/Rashmi: Delayed;Pace decreased (<100 feet/min) Step Length: Left shortened;Right shortened Stance: Left decreased Gait Abnormalities: Antalgic;Decreased step clearance Interventions: Safety awareness training;Verbal cues Braces/Orthotics:  
 
Left Knee Cold Type: Cryocuff Body Structures Involved: 1. Bones 2. Joints 3. Muscles Body Functions Affected: 1. Neuromusculoskeletal 
2. Movement Related Activities and Participation Affected: 1. General Tasks and Demands 2. Mobility 3. Self Care Number of elements that affect the Plan of Care: 4+: HIGH COMPLEXITY CLINICAL PRESENTATION:  
Presentation: Stable and uncomplicated: LOW COMPLEXITY CLINICAL DECISION MAKIN[address] [place] [address] AM-PAC 6 Clicks Basic Mobility Inpatient Short Form How much difficulty does the patient currently have. .. Unable A Lot A Little None 1. Turning over in bed (including adjusting bedclothes, sheets and blankets)? [] 1   [] 2   [x] 3   [] 4  
2. Sitting down on and standing up from a chair with arms ( e.g., wheelchair, bedside commode, etc.)   [] 1   [x] 2   [] 3   [] 4  
3. Moving from lying on back to sitting on the side of the bed? [] 1   [] 2   [x] 3   [] 4 How much help from another person does the patient currently need. .. Total A Lot A Little None 4. Moving to and from a bed to a chair (including a wheelchair)? [] 1   [x] 2   [] 3   [] 4  
5. Need to walk in hospital room?    [] 1   [x] 2   [] 3   [] 4  
 6.  Climbing 3-5 steps with a railing? [x] 1   [] 2   [] 3   [] 4  
© 2007, Trustees of [address] [place] [address] [address] [id number], under license to [employer]. All rights reserved Score:  Initial: 13 Most Recent: X (Date: -- ) Interpretation of Tool:  Represents activities that are increasingly more difficult (i.e. Bed mobility, Transfers, Gait). Score 24 23 22-20 19-15 14-10 9-7 6 Modifier CH CI CJ CK CL CM CN   
 
? Mobility - Walking and Moving Around:  
  - CURRENT STATUS: CL - 60%-79% impaired, limited or restricted  - GOAL STATUS: CK - 40%-59% impaired, limited or restricted  - D/C STATUS:  ---------------To be determined--------------- [place] / Product Type: Medicare /   
 
Medical Necessity:    
· Patient demonstrates fair rehab potential due to higher previous functional level. Reason for Services/Other Comments: 
· Patient continues to require present interventions due to patient's inability to perform functional mobility at a CGA level. Use of outcome tool(s) and clinical judgement create a POC that gives a: Clear prediction of patient's progress: LOW COMPLEXITY  
  
 
 
 
TREATMENT:  
(In addition to Assessment/Re-Assessment sessions the following treatments were rendered) Pre-treatment Symptoms/Complaints:  Minimal complaints of pain 
Pain: Initial:  
   Post Session:  Not rated Therapeutic Exercise: (15 Minutes):  Exercises per grid below to improve mobility, strength and coordination. Required minimal visual, verbal and tactile cues to promote proper body alignment and promote proper body breathing techniques. Progressed range, repetitions and complexity of movement as indicated. Gait Training (10 Minutes):  Gait training to improve and/or restore physical functioning as related to mobility, strength and balance.   Ambulated 120 Feet (ft) with Stand-by assistance;Contact guard assistance using a Walker, rolling and minimal Safety awareness training;Verbal cues related to their stance phase and stride length to promote proper body alignment, promote proper body posture and promote proper body mechanics. Date: 
10/30/18 Date: 
10/31/18 Date: 
  
ACTIVITY/EXERCISE AM PM AM PM AM PM  
GROUP THERAPY  []  []  []  []  []  [] Ankle Pumps  10 10 Quad Sets  10 10 Gluteal Sets  10 10 Hip ABd/ADduction  10AA 10 Straight Leg Raises  10AA 10 Knee Slides  10AA 10 Short Arc Quads  10AA [address] [facility] Chair Slides B = bilateral; AA = active assistive; A = active; P = passive Treatment/Session Assessment:   
 Response to Treatment:  Doing well Education: 
[x] Home Exercises [x] Fall Precautions [] Hip Precautions [] D/C Instruction Review 
[] Knee/Hip Prosthesis Review [x] Walker Management/Safety [] Adaptive Equipment as Needed Interdisciplinary Collaboration:  
o Registered Nurse After treatment position/precautions:  
o Up in chair 
o Bed/Chair-wheels locked 
o Call light within reach 
o RN notified Compliance with Program/Exercises: Will assess as treatment progresses. Recommendations/Intent for next treatment session:  Treatment next visit will focus on increasing Ms. Dye's independence with bed mobility, transfers, gait training, strength/ROM exercises, modalities for pain, and patient education. Total Treatment Duration: PT Patient Time In/Time Out Time In: 1030 Time Out: 1055 Kristina Cook PTA

## 2018-10-31 NOTE — PROGRESS NOTES
Pt resting well up in recliner with family at bedside. Pain to knee controlled well to day. Pt voiding well. Appetite fair for dinner. inst pt to call for any needs. Uneventful shift.

## 2018-10-31 NOTE — CONSULTS
Physical Medicine & Rehabilitation Note-consult    Patient: Lilian Bettencourt MRN: [id number]  SSN: [id number]    YOB: 1928  Age: 80 y.o. Sex: female      Admit Date: 10/30/2018  Admitting Physician: Ambika Willett MD    Medical Decision Making/Plan/Recommend:  Gait impairment s/p L TKA. Post op PT well tolerated so far, making excellent functional gains. Patient lives alone. Patient plans for SNF discharge. Patient will benefit from continued daily skilled rehabilitation efforts and regular medical and nursing care at SNF. Continue PT, OT for active/assisted/passive left TKA ROM, strengthening, mobility, transfers, gait training. Will follow progress. Chief Complaint : Gait dysfunction secondary to below. Admit Diagnosis: Primary osteoarthritis of left knee [M17.12]  left total knee arthroplasty 10/30/2018  Pain  DVT risk  Post op acute blood loss anemia  Osteoarthritis  HTN (hypertension), benign  Acute Rehab Dx:  Gait impairment  Mobility and ambulation deficits  Self Care/ADL deficits    Medical Dx:  Past Medical History:   Diagnosis Date    Anemia     EGD 10-3-12:  schatzki's ring, duodenal ulcer, gastritis. Colonoscopy 10-3-12: diverticulosis, colon polyp. repeat colonoscopy 10 years    Heart murmur     per cardiology note: Murmur: \"3/6 high pitched apical  systolic murmur\"; ECHO done 8/3/18    HTN (hypertension), benign     Hypercholesteremia     no meds    Migraine     Osteoarthritis      Subjective:     HPI: Lilian Bettencourt is a 80 y.o. female patient at [address] [place] [address] who was admitted on 10/30/2018  by Ambika Willett MD with below mentioned medical history, is being seen for Physical Medicine and Rehabilitation consult. Lilian Bettencourt who presented with worseneing left knee pain due to end stage DJD underwent a left total knee arthroplasty per Dr. Ambika Willett MD on 10/30/2018.  The patient's post operative course has been uncomplicated. Pain, and common post op issues well tolerated. Patient is to be WBAT LLE. Patient is starting to stand, taking steps working with acute PT and OT. Patient is making expected gains with ambulation, mobility, and ROM. Patient shows significant functional deficits, gait dysfunciton due to knee pain, decreased ROM and strength. Rad Fitzgerald is seen and examined today. Medical Records reviewed. Patient denies any other pre morbid functional deficits. Patient has been independent with ambulation, prior to admission, limited by left knee pain. Current Level of Function:  bed mobility - supervision, transfers - SBA, decreased balance , ambulation - 120' with RW and SBA    Prior Level of Function/Work/Activity:  Functionally I with ADLs and amb. Lives alone    Family History   Problem Relation Age of Onset    Cancer Sister         breast      Social History     Tobacco Use    Smoking status: Never Smoker    Smokeless tobacco: Never Used   Substance Use Topics    Alcohol use: No     Alcohol/week: 0.0 oz     Past Surgical History:   Procedure Laterality Date    ENDOSCOPY, COLON, DIAGNOSTIC  10/3/12    diverticulosis, colon polyp    HX APPENDECTOMY      HX BACK SURGERY      HX HIP REPLACEMENT Left 08/2018    HX HYSTERECTOMY      HX ORTHOPAEDIC      right wrist, left shoulder    HX REFRACTIVE SURGERY      HX TONSILLECTOMY        Prior to Admission medications    Medication Sig Start Date End Date Taking? Authorizing Provider   vit C,F-Tj-zyxyz-lutein-zeaxan (PRESERVISION AREDS-2) [id number] mg-unit-mg-mg cap Take  by mouth. Yes Provider, Historical   acetaminophen (TYLENOL) 325 mg tablet Take 650 mg by mouth every four (4) hours as needed for Pain. Yes Provider, Historical   gabapentin (NEURONTIN) 100 mg capsule Take 1 Cap by mouth two (2) times daily as needed.  10/16/18  Yes Ronnell Olvera MD   aspirin delayed-release 81 mg tablet Take 1 Tab by mouth every twelve (12) hours every twelve (12) hours. 18  Yes Looper, Staci Favre, PA   carvedilol (COREG) 6.25 mg tablet Take 1 Tab by mouth two (2) times daily (with meals). Patient taking differently: Take 6.25 mg by mouth two (2) times daily (with meals). Take morning of surgery per anesthesia guidelines. 18  Yes Clarance Favre, MD   DISABLED PLACARD ([address] [place] [address]) [place] handicapped tag -   Diagnosis - left knee arthritis M17.12 18   Clarance Favre, MD     Allergies   Allergen Reactions    Codeine Other (comments)     GI upset    Penicillins Rash        Review of Systems: +left knee pain, +antalgic gait. Denies chest pain, shortness of breath, cough, headache, visual problems, abdominal pain, dysurea, calf pain. Pertinent positives are as noted in the medical records and unremarkable otherwise. Objective:     Vitals:  Blood pressure 150/75, pulse 66, temperature 97.7 °F (36.5 °C), resp. rate 18, SpO2 97 %, not currently breastfeeding. Temp (24hrs), Av.6 °F (36.4 °C), Min:97.4 °F (36.3 °C), Max:97.9 °F (36.6 °C)        Intake and Output:  10/29 1901 - 10/31 0700  In: 2200 [P.O.:600; I.V.:1600]  Out:  [Urine:1870]    Physical Exam:   General: Alert and age appropriately oriented. No acute cardio respiratory distress. HEENT: Normocephalic, no conjunctival pallor. Oral mucosa moist without cyanosis. No JVD. Lungs: Clear to auscultation bilaterally. Respiration even and unlabored   Heart: Regular rate and rhythm, S1, S2   No  Murmurs. Abdomen: Soft, non-tender, non-distended. Genitourinary: defered   Neuromuscular:      Grossly no focal motor deficits. Left knee extension strong  Left ankle dorsiflexion 5/5  Left ankle plantarflexion 5/5  No sensory deficits distally BLE to soft touch. Skin/extremity: Non tender calves BLE. No rashes, no marginal erythema.                                                                                          Labs/Studies:  Recent Results (from the past 67 hour(s))   TYPE & SCREEN    Collection Time: 10/30/18  8:36 AM   Result Value Ref Range    Crossmatch Expiration 11/02/2018     ABO/Rh(D) Adah Cousins POSITIVE     Antibody screen NEG    GLUCOSE, POC    Collection Time: 10/30/18  8:41 AM   Result Value Ref Range    Glucose (POC) 123 (H) 65 - 100 mg/dL   HEMOGLOBIN    Collection Time: 10/30/18  7:49 PM   Result Value Ref Range    HGB 10.7 (L) 11.7 - 15.4 g/dL   HEMOGLOBIN    Collection Time: 10/31/18  4:37 AM   Result Value Ref Range    HGB 9.5 (L) 11.7 - 15.4 g/dL   PLEASE READ & DOCUMENT PPD TEST IN 72 HRS    Collection Time: 10/31/18  9:00 AM   Result Value Ref Range    PPD  Negative    mm Induration  mm       Functional Assessment:  Reviewed participation and progress in therapies  Gross Assessment  AROM: Within functional limits(R LE) (10/30/18 1400)  Strength: Generally decreased, functional(R LE 3+/5) (10/30/18 1400)  Sensation: Intact(R LE) (10/30/18 1400)   Bed Mobility  Supine to Sit: Supervision (10/31/18 1400)   Balance  Sitting: Intact (10/30/18 1400)  Standing: With support (10/30/18 1400)   Grooming  Grooming Assistance: Supervision/set up (10/31/18 0900)           Bed/Mat Mobility  Supine to Sit: Supervision (10/31/18 1400)  Sit to Stand: Stand-by assistance (10/31/18 1400)  Bed to Chair: Stand-by assistance (10/31/18 1400)     Ambulation:  Gait  Base of Support: Narrowed (10/31/18 1200)  Speed/Rashmi: Delayed;Pace decreased (<100 feet/min) (10/31/18 1400)  Step Length: Left shortened;Right shortened (10/31/18 1400)  Stance: Left decreased (10/31/18 1400)  Gait Abnormalities: Antalgic;Decreased step clearance (10/31/18 1400)  Ambulation - Level of Assistance: Stand-by assistance (10/31/18 1400)  Distance (ft): 135 Feet (ft) (10/31/18 1400)  Assistive Device: Walker, rolling (10/31/18 1400)  Interventions: Safety awareness training;Verbal cues (10/31/18 1400)  Duration: 15 Minutes (10/31/18 1400)    Impression/Plan:     Active Problems:    Arthritis of left knee (10/16/2018)        Current Facility-Administered Medications   Medication Dose Route Frequency Provider Last Rate Last Dose    calcium carbonate (TUMS) chewable tablet 200 mg [elemental]  200 mg Oral TID WITH MEALS Patrick Adhikari MD        HYDROcodone-acetaminophen (NORCO) 5-325 mg per tablet 1 Tab  1 Tab Oral Q4H PRN Carol Hernandez MD        carvedilol (COREG) tablet 6.25 mg  6.25 mg Oral BID WITH MEALS CEDRICK Turner   6.25 mg at 10/31/18 4740    alcohol 62% (NOZIN) nasal  1 Ampule  1 Ampule Topical Q12H Miguel Irby PA   1 Ampule at 10/31/18 0902    0.9% sodium chloride infusion  100 mL/hr IntraVENous CONTINUOUS Miguel Mahamed,  mL/hr at 10/30/18 2215 100 mL/hr at 10/30/18 2215    sodium chloride (NS) flush 5-10 mL  5-10 mL IntraVENous Q8H Alex Green PA   5 mL at 10/30/18 1658    sodium chloride (NS) flush 5-10 mL  5-10 mL IntraVENous PRN Lashay Green PA        celecoxib (CELEBREX) capsule 200 mg  200 mg Oral Q12H Alex Green PA   200 mg at 10/31/18 0902    naloxone (NARCAN) injection 0.2-0.4 mg  0.2-0.4 mg IntraVENous Q10MIN PRN Lashay Green PA        dexamethasone (DECADRON) injection 10 mg  10 mg IntraVENous ONCE Lashay Green [place]        ondansetron (ZOFRAN ODT) tablet 4 mg  4 mg Oral Q4H PRN Lashay Green PA        zolpidem (AMBIEN) tablet 5 mg  5 mg Oral QHS PRN Lashay Green PA        aspirin delayed-release tablet 81 mg  81 mg Oral Q12H Alex Green PA   81 mg at 10/31/18 0902    HYDROmorphone (PF) (DILAUDID) injection 1 mg  1 mg IntraVENous Q3H PRN Alex Green PA        senna-docusate (PERICOLACE) 8.6-50 mg per tablet 2 Tab  2 Tab Oral DAILY Miguel Mahamed, PA   2 Tab at 10/31/18 0902    diphenhydrAMINE (BENADRYL) capsule 25 mg  25 mg Oral Q4H PRN Carol Hernandez MD            Recommendations: Recommend SNF discharge/ sub acute rehab.    Continue Acute Rehab Program. PT, OT  to focus on  gait training, transfer training, balance activities, ROM and strengthening exercises. Coordination of rehab/medical care. Counseling of Physical Medicine & Rehab care issues management. Will follow with SW/ /Admissions Coordinators regarding insurance approvals and acceptance. Rehabilitation Management/ Medical Management: 1. Devices:Walkers, Type: Rolling Walker  2. Consult:Rehab team including PT, OT,  and . 3. Disposition Rehab-discussed with patient. 4. Thigh-high or knee-high ERIKA's when out of bed. 5. DVT Prophylaxis - aspirin 81 mg bid x 35days. 6. Incentive spirometer Q1H while awake  7. Post op hemorrhagic anemia- monitor. Check in am.   8. Activity: WBAT LLE  9. Planned Labs: CBC,BMP  10. Pain Control:   Continue scheduled tylenol, Celebrex and  PRN meds. 11. Wound Care: Keep left TKA wound clean and dry and reinforce dressing PRN. May remove Aquacel 1 week post op ad replace with new one. Remove staples 12-14 post surgery, when incision appears appropriately closed and apply benzoin and 1/2\" steristrips. Follow up with ORTHO per instructions. Thank you for the opportunity to participate in the care of this patient.     Signed By: Cecilia Adhikari MD

## 2018-10-31 NOTE — PROGRESS NOTES
2018 Post Op day: 1 Day Post-Op Admit Diagnosis: Primary osteoarthritis of left knee [M17.12] LAB:   
Recent Results (from the past 24 hour(s)) TYPE & SCREEN Collection Time: 10/30/18  8:36 AM  
Result Value Ref Range Crossmatch Expiration 2018 ABO/Rh(D) O POSITIVE Antibody screen NEG   
GLUCOSE, POC Collection Time: 10/30/18  8:41 AM  
Result Value Ref Range Glucose (POC) 123 (H) 65 - 100 mg/dL HEMOGLOBIN Collection Time: 10/30/18  7:49 PM  
Result Value Ref Range HGB 10.7 (L) 11.7 - 15.4 g/dL HEMOGLOBIN Collection Time: 10/31/18  4:37 AM  
Result Value Ref Range HGB 9.5 (L) 11.7 - 15.4 g/dL Vital Signs:   
Patient Vitals for the past 8 hrs: 
 BP Temp Pulse Resp SpO2  
10/31/18 0710 163/84 97.9 °F (36.6 °C) 68 18 97 % 10/31/18 0414 112/68 97.5 °F (36.4 °C) 62 16 95 % Temp (24hrs), Av.7 °F (36.5 °C), Min:97.4 °F (36.3 °C), Max:98.4 °F (36.9 °C) Pain Control:  
Pain Assessment Pain Scale 1: Numeric (0 - 10) Pain Intensity 1: 4 Pain Onset 1: 2018 Pain Location 1: Knee Pain Orientation 1: Left Pain Description 1: Aching, Constant, Sore, Sharp, Dull Pain Intervention(s) 1: Ambulation/Increased Activity, Cold pack, Elevation, Exercise, Repositioned Subjective: Doing well, pain is well controlled, no complaints Objective:  No Acute Distress, Alert and Oriented, left knee dressing is C/D/I. Calves are soft, NT. Neurovascular exam is normal 
  
 
Assessment:  
Patient Active Problem List  
Diagnosis Code  Anemia D64.9  
 HTN (hypertension), benign I10  
 Hypercholesteremia E78.00  Migraine G43.909  Insomnia G47.00  Depression F32.9  Abnormal EKG R94.31  Systolic murmur V60.1  Essential hypertension with goal blood pressure less than 130/85 I10  Mixed hyperlipidemia E78.2  Arthritis of left hip M16.12  
 Arthritis of left knee M17.12 Status Post Procedure(s) (LRB): 
 LEFT KNEE ARTHROPLASTY TOTAL (Left) Plan: Continue Physical Therapy, Monitor Hgb. ASA/SCDs for DVT prophylaxis. Awaiting rehab placement. Pt seen by Dr. Hao Espinosa this AM. Signed By: CEDRICK Woodruff

## 2018-10-31 NOTE — PROGRESS NOTES
Pt is alert and oriented x3. Pt resting in chair with family in the room No NV deficits noted. Pt is able to dorsi/ plantar flex and has +2 pedal pulses. Ice man ice pack on left knee Dressing to surgical incision is dry, and intact. Pt denies any pain at this time Bed is low and locked, call light in reach. IS at bedside and pt demonstrated use. No needs stated.

## 2018-10-31 NOTE — PROGRESS NOTES
Problem: Self Care Deficits Care Plan (Adult) Goal: *Acute Goals and Plan of Care (Insert Text) GOALS:  
DISCHARGE GOALS (in preparation for going home/rehab):  3 days 1. Ms. Kevin Dyer will perform one lower body dressing activity with minimal assistance required to demonstrate improved functional mobility and safety. -GOAL MET 10/31/2018 2. Ms. Kevin Dyer will perform one lower body bathing activity with minimal assistance required to demonstrate improved functional mobility and safety. -GOAL MET 10/31/2018 3. Ms. Kevin Dyer will perform toileting/toilet transfer with contact guard assistance to demonstrate improved functional mobility and safety. -GOAL MET 10/31/2018 4. Ms. Kevin Dyer will perform shower transfer with contact guard assistance to demonstrate improved functional mobility and safety. -GOAL MET 10/31/2018 JOINT CAMP OCCUPATIONAL THERAPY TKA: Discharge and Treatment Day: 1st 10/31/2018INPATIENT: Hospital Day: 2 [place] / [place] / Product Type: Medicare /  
  
NAME/AGE/GENDER: Huber Robertson is a 80 y.o. female PRIMARY DIAGNOSIS:  Primary osteoarthritis of left knee [M17.12] Procedure(s) and Anesthesia Type: 
   * LEFT KNEE ARTHROPLASTY TOTAL - Spinal (Left) ICD-10: Treatment Diagnosis:  
 · Pain in Left Knee (M25.562) · Stiffness of Left Knee, Not elsewhere classified (M25.662) ASSESSMENT:  
 Ms. Kevin Dyer is s/p Left TKA and presents with decreased weight bearing on L LE and decreased independence with functional mobility and activities of daily living. Patient completed shower and dressing as charter below in ADL grid and is ambulating with rolling walker and stand by assist.  Patient has met 4/4 goals and plans for rehab as that is what she did in January for her L JANE. She will do well, she no longer requires skilled OT for basic ADL's. 
D/C OT for acute deficits. This section established at most recent assessment PROBLEM LIST (Impairments causing functional limitations): 1. Decreased Strength 2. Decreased ADL/Functional Activities 3. Decreased Transfer Abilities 4. Increased Pain 5. Increased Fatigue 6. Decreased Flexibility/Joint Mobility 7. Decreased Knowledge of Precautions INTERVENTIONS PLANNED: (Benefits and precautions of occupational therapy have been discussed with the patient.) 1. Activities of daily living training 2. Adaptive equipment training 3. Balance training 4. Clothing management 5. Donning&doffing training 6. Theraputic activity TREATMENT PLAN: Frequency/Duration: Follow patient 1-2tx to address above goals. Rehabilitation Potential For Stated Goals: Excellent RECOMMENDED REHABILITATION/EQUIPMENT: (at time of discharge pending progress): Continue Skilled Therapy. OCCUPATIONAL PROFILE AND HISTORY:  
History of Present Injury/Illness (Reason for Referral): Pt presents this date s/p (left) TKA. Past Medical History/Comorbidities: Ms. Arsenio De La Garza  has a past medical history of Anemia, Heart murmur, HTN (hypertension), benign, Hypercholesteremia, Migraine, and Osteoarthritis. Ms. Arsenio De La Garza  has a past surgical history that includes hx hysterectomy; hx refractive surgery; hx back surgery; hx orthopaedic; hx tonsillectomy; endoscopy, colon, diagnostic (10/3/12); hx appendectomy; hx hip replacement (Left, 08/2018); and LEFT HIP ARTHROPLASTY TOTAL (Left, 8/7/2018). Social History/Living Environment:  
Home Environment: Private residence # Steps to Enter: 0 One/Two Story Residence: One story Living Alone: Yes Support Systems: Child(luciano) Patient Expects to be Discharged to[de-identified] Rehabilitation facility([facility]) Current DME Used/Available at Home: Cane, straight, Tub transfer bench, Walker, rolling, Commode, bedside Tub or Shower Type: Tub/Shower combination Prior Level of Function/Work/Activity: L JANE in 8/2018. Lives alone, has family that lives near.  Independent prior, looking forward to getting back to IADl's and yardwork next year. Number of Personal Factors/Comorbidities that affect the Plan of Care: Brief history (0):  LOW COMPLEXITY ASSESSMENT OF OCCUPATIONAL PERFORMANCE[de-identified]  
Most Recent Physical Functioning:  
     
 
  
 
   
 
    
 
  
 
  
 
   
 
   
 
Basic ADLs (From Assessment) Complex ADLs (From Assessment) Basic ADL Feeding: Independent Oral Facial Hygiene/Grooming: Supervision Bathing: Stand-by assistance Type of Bath: Chlorhexidine (CHG), Full, Shower Upper Body Dressing: Independent Lower Body Dressing: Stand-by assistance Toileting: Supervision Grooming/Bathing/Dressing Activities of Daily Living Grooming Grooming Assistance: Supervision/set up Upper Body Bathing Bathing Assistance: Independent Lower Body Bathing Bathing Assistance: Stand-by assistance Upper Body Dressing Assistance Dressing Assistance: Independent Functional Transfers Bathroom Mobility: Stand-by assistance Toilet Transfer : Supervision Shower Transfer: Supervision Lower Body Dressing Assistance Dressing Assistance: Supervision/set up Bed/Mat Mobility Supine to Sit: Supervision Sit to Stand: Supervision Bed to Chair: Supervision Physical Skills Involved: 1. Range of Motion 2. Balance 3. Strength 4. Activity Tolerance Cognitive Skills Affected (resulting in the inability to perform in a timely and safe manner): 1. wfl Psychosocial Skills Affected: 1. wfl Number of elements that affect the Plan of Care: 1-3:  LOW COMPLEXITY CLINICAL DECISION MAKING:  
[employer] AM-PAC 6 Clicks Daily Activity Inpatient Short Form How much help from another person does the patient currently need. .. Total A Lot A Little None 1. Putting on and taking off regular lower body clothing? [] 1   [] 2   [x] 3   [] 4  
2. Bathing (including washing, rinsing, drying)?    [] 1   [] 2   [x] 3   [] 4  
 3.  Toileting, which includes using toilet, bedpan or urinal?   [] 1   [] 2   [x] 3   [] 4  
4. Putting on and taking off regular upper body clothing? [] 1   [] 2   [] 3   [x] 4  
5. Taking care of personal grooming such as brushing teeth? [] 1   [] 2   [] 3   [x] 4  
6. Eating meals? [] 1   [] 2   [] 3   [x] 4  
© 2007, Trustees of [address] [place] [address] [address] [address], under license to [employer]. All rights reserved Score:  Initial: 18 Most Recent: X (Date: -- ) Interpretation of Tool:  Represents activities that are increasingly more difficult (i.e. Bed mobility, Transfers, Gait). Score 24 23 22-20 19-15 14-10 9-7 6 Modifier CH CI CJ CK CL CM CN   
 
? Self Care:  
  - CURRENT STATUS: CJ - 20%-39% impaired, limited or restricted  - GOAL STATUS: CJ - 20%-39% impaired, limited or restricted  - D/C STATUS:  CJ - 20%-39% impaired, limited or restricted [place] / [place] / Product Type: Medicare /   
 
  
Use of outcome tool(s) and clinical judgement create a POC that gives a: MODERATE COMPLEXITY  
  
 
 
 
TREATMENT:  
(In addition to Assessment/Re-Assessment sessions the following treatments were rendered) Pre-treatment Symptoms/Complaints:   
Pain: Initial:  
Pain Intensity 1: 2  Post Session:  0 Self Care: (40): Procedure(s) (per grid) utilized to improve and/or restore self-care/home management as related to dressing, bathing, toileting and grooming. Required minimal verbal cueing to facilitate activities of daily living skills. Treatment/Session Assessment:   
 Response to Treatment:  Good, sitting up in recliner. Education: 
[] Home Exercises [x] Fall Precautions [] Hip Precautions [] Going Home Video [x] Knee/Hip Prosthesis Review [x] Walker Management/Safety [x] Adaptive Equipment as Needed Interdisciplinary Collaboration:  
o Physical Therapist 
o Occupational Therapist 
o Registered Nurse After treatment position/precautions:  
o Up in chair 
o Bed/Chair-wheels locked 
o Caregiver at bedside 
o Call light within reach 
o RN notified Compliance with Program/Exercises: Compliant all of the time. Recommendations/Intent for next treatment session:  D/C for acute deficits. Total Treatment Duration: OT Patient Time In/Time Out Time In: 0900 Time Out: 0679 Cathy Joyce, OT

## 2018-11-01 LAB
HGB BLD-MCNC: 8.3 G/DL (ref 11.7–15.4)
MM INDURATION POC: NORMAL MM (ref 0–5)
PPD POC: NORMAL NEGATIVE

## 2018-11-01 PROCEDURE — 65270000029 HC RM PRIVATE

## 2018-11-01 PROCEDURE — 74011250637 HC RX REV CODE- 250/637: Performed by: ORTHOPAEDIC SURGERY

## 2018-11-01 PROCEDURE — 74011250636 HC RX REV CODE- 250/636: Performed by: PHYSICIAN ASSISTANT

## 2018-11-01 PROCEDURE — 97150 GROUP THERAPEUTIC PROCEDURES: CPT

## 2018-11-01 PROCEDURE — 36415 COLL VENOUS BLD VENIPUNCTURE: CPT

## 2018-11-01 PROCEDURE — 97116 GAIT TRAINING THERAPY: CPT

## 2018-11-01 PROCEDURE — 97110 THERAPEUTIC EXERCISES: CPT

## 2018-11-01 PROCEDURE — 74011250637 HC RX REV CODE- 250/637: Performed by: PHYSICIAN ASSISTANT

## 2018-11-01 PROCEDURE — 85018 HEMOGLOBIN: CPT

## 2018-11-01 RX ADMIN — HYDROCODONE BITARTRATE AND ACETAMINOPHEN 1 TABLET: 5; 325 TABLET ORAL at 09:55

## 2018-11-01 RX ADMIN — HYDROMORPHONE HYDROCHLORIDE 1 MG: 2 INJECTION, SOLUTION INTRAMUSCULAR; INTRAVENOUS; SUBCUTANEOUS at 01:05

## 2018-11-01 RX ADMIN — CALCIUM CARBONATE 200 MG: 500 TABLET, CHEWABLE ORAL at 08:58

## 2018-11-01 RX ADMIN — HYDROCODONE BITARTRATE AND ACETAMINOPHEN 1 TABLET: 5; 325 TABLET ORAL at 21:12

## 2018-11-01 RX ADMIN — ASPIRIN 81 MG: 81 TABLET, DELAYED RELEASE ORAL at 21:12

## 2018-11-01 RX ADMIN — HYDROMORPHONE HYDROCHLORIDE 1 MG: 2 INJECTION, SOLUTION INTRAMUSCULAR; INTRAVENOUS; SUBCUTANEOUS at 23:18

## 2018-11-01 RX ADMIN — HYDROCODONE BITARTRATE AND ACETAMINOPHEN 1 TABLET: 5; 325 TABLET ORAL at 02:24

## 2018-11-01 RX ADMIN — CARVEDILOL 6.25 MG: 6.25 TABLET, FILM COATED ORAL at 08:59

## 2018-11-01 RX ADMIN — HYDROCODONE BITARTRATE AND ACETAMINOPHEN 1 TABLET: 5; 325 TABLET ORAL at 15:29

## 2018-11-01 RX ADMIN — Medication 10 ML: at 15:29

## 2018-11-01 RX ADMIN — CELECOXIB 200 MG: 200 CAPSULE ORAL at 08:59

## 2018-11-01 RX ADMIN — ASPIRIN 81 MG: 81 TABLET, DELAYED RELEASE ORAL at 08:59

## 2018-11-01 RX ADMIN — Medication 1 AMPULE: at 21:12

## 2018-11-01 RX ADMIN — CARVEDILOL 6.25 MG: 6.25 TABLET, FILM COATED ORAL at 16:38

## 2018-11-01 RX ADMIN — CALCIUM CARBONATE 200 MG: 500 TABLET, CHEWABLE ORAL at 16:39

## 2018-11-01 RX ADMIN — ONDANSETRON 4 MG: 8 TABLET, ORALLY DISINTEGRATING ORAL at 08:06

## 2018-11-01 RX ADMIN — HYDROMORPHONE HYDROCHLORIDE 1 MG: 2 INJECTION, SOLUTION INTRAMUSCULAR; INTRAVENOUS; SUBCUTANEOUS at 16:55

## 2018-11-01 RX ADMIN — HYDROMORPHONE HYDROCHLORIDE 1 MG: 2 INJECTION, SOLUTION INTRAMUSCULAR; INTRAVENOUS; SUBCUTANEOUS at 06:52

## 2018-11-01 RX ADMIN — SENNOSIDES AND DOCUSATE SODIUM 2 TABLET: 8.6; 5 TABLET ORAL at 08:59

## 2018-11-01 RX ADMIN — Medication 1 AMPULE: at 09:00

## 2018-11-01 RX ADMIN — CELECOXIB 200 MG: 200 CAPSULE ORAL at 21:14

## 2018-11-01 NOTE — PROGRESS NOTES
Pt is alert and oriented x3. Pt resting quietly in bed No NV deficits noted. Pt is able to dorsi/ plantar flex and has +2 pedal pulses. Dressing to surgical incision is dry, and intact. Pain is controlled at this time. Bed is low and locked, call light in reach. IS at bedside and pt demonstrated use. Pt IV was already pulled out when I went to assess. No needs stated.

## 2018-11-01 NOTE — PROGRESS NOTES
2018 Post Op day: 2 Days Post-Op Admit Diagnosis: Primary osteoarthritis of left knee [M17.12] LAB:   
Recent Results (from the past 24 hour(s)) PLEASE READ & DOCUMENT PPD TEST IN 72 HRS Collection Time: 10/31/18  9:00 AM  
Result Value Ref Range PPD  Negative  
 mm Induration  mm HEMOGLOBIN Collection Time: 18  5:05 AM  
Result Value Ref Range HGB 8.3 (L) 11.7 - 15.4 g/dL Vital Signs:   
Patient Vitals for the past 8 hrs: 
 BP Temp Pulse Resp SpO2  
18 0310 126/65 97.4 °F (36.3 °C) 67 14 94 % 10/31/18 2335 133/53 97.8 °F (36.6 °C) 62 18 94 % Temp (24hrs), Av.6 °F (36.4 °C), Min:97.4 °F (36.3 °C), Max:97.8 °F (36.6 °C) Pain Control:  
Pain Assessment Pain Scale 1: Numeric (0 - 10) Pain Intensity 1: 8 Pain Onset 1: 2018 Pain Location 1: Knee Pain Orientation 1: Left Pain Description 1: Aching, Constant, Sore, Sharp, Dull Pain Intervention(s) 1: Ambulation/Increased Activity, Cold pack, Elevation, Exercise, Repositioned Subjective: Doing well, pain is well controlled, no complaints Objective:  No Acute Distress, Alert and Oriented, left knee dressing is C/D/I. Calves are soft, NT. Neurovascular exam is normal 
  
 
Assessment:  
Patient Active Problem List  
Diagnosis Code  Anemia D64.9  
 HTN (hypertension), benign I10  
 Hypercholesteremia E78.00  Migraine G43.909  Insomnia G47.00  Depression F32.9  Abnormal EKG R94.31  Systolic murmur M45.6  Essential hypertension with goal blood pressure less than 130/85 I10  Mixed hyperlipidemia E78.2  Arthritis of left hip M16.12  
 Arthritis of left knee M17.12 Status Post Procedure(s) (LRB): LEFT KNEE ARTHROPLASTY TOTAL (Left) Plan: Continue Physical Therapy, Monitor Hgb. ASA/SCDs for DVT prophylaxis. Awaiting rehab placement.   
Signed By: CEDRICK Romeo

## 2018-11-01 NOTE — DISCHARGE SUMMARY
REHABILITATION DISCHARGE SUMMARY     Patient: Magaly Baker MRN: [id number]  SSN: [id number]    YOB: 1928  Age: 80 y.o. Sex: female      Date: 11/1/2018  Admit Date: 10/30/2018  Discharge Date: 11/1/2018    Admitting Physician: Ralph Rowan MD   Primary Care Physician: Gwen Sheets MD     Admission Condition: good    Chief Complaint : Gait dysfunction secondary to below. Admit Diagnosis: Primary osteoarthritis of left knee [M17.12]  left total knee arthroplasty 10/30/2018  Pain  DVT risk  Post op acute blood loss anemia  Osteoarthritis  HTN (hypertension), benign  Acute Rehab Dx:  Gait impairment  Mobility and ambulation deficits  Self Care/ADL deficits     HPI: Magaly Baker is a 80 y.o. female patient at [address] [place] [address] who was admitted on 10/30/2018  by Ralph Rowan MD with below mentioned medical history, is being seen for Physical Medicine and Rehabilitation. Magaly Baker who presented with worseneing left knee pain due to end stage DJD underwent a left total knee arthroplasty per Dr. Ralph Rowan MD on 10/30/2018. The patient's post operative course has been uncomplicated. Pain, and common post op issues well tolerated. Patient is to be WBAT LLE. Patient is starting to stand, taking steps working with acute PT and OT. Patient is making expected gains with ambulation, mobility, and ROM. Patient shows significant functional deficits, gait dysfunciton due to knee pain, decreased ROM and strength. Magaly Baker is seen and examined today. Medical Records reviewed. Patient denies any other pre morbid functional deficits.  Patient has been independent with ambulation, prior to admission, limited by left knee pain.       Rehabiitation Course:   Functional  Level On Admission: Walk: Modified Independent  Functional Level At Discharge: Walk: Contact Guard Assist  Home Architecture: Home Situation  Home Environment: Private residence (10/30/18 1455)  # Steps to Enter: 0 (10/30/18 1455)  One/Two Story Residence: One story (10/30/18 1455)  Living Alone: Yes (10/30/18 1455)  Support Systems: Child(luciano) (10/30/18 1455)  Patient Expects to be Discharged to[de-identified] Rehabilitation facility([facility]) (10/30/18 1455)  Current DME Used/Available at Home: [employer], straight;Tub transfer bench;Walker, rolling;Commode, bedside (10/30/18 1455)  Tub or Shower Type: Tub/Shower combination (10/30/18 1455)     Past Medical History:   Diagnosis Date    Anemia     EGD 10-3-12:  schatzki's ring, duodenal ulcer, gastritis. Colonoscopy 10-3-12: diverticulosis, colon polyp. repeat colonoscopy 10 years    Heart murmur     per cardiology note: Murmur: \"3/6 high pitched apical  systolic murmur\"; ECHO done 8/3/18    HTN (hypertension), benign     Hypercholesteremia     no meds    Migraine     Osteoarthritis       Past Surgical History:   Procedure Laterality Date    ENDOSCOPY, COLON, DIAGNOSTIC  10/3/12    diverticulosis, colon polyp    HX APPENDECTOMY      HX BACK SURGERY      HX HIP REPLACEMENT Left 08/2018    HX HYSTERECTOMY      HX ORTHOPAEDIC      right wrist, left shoulder    HX REFRACTIVE SURGERY      HX TONSILLECTOMY        Family History   Problem Relation Age of Onset    Cancer Sister         breast      Social History     Tobacco Use    Smoking status: Never Smoker    Smokeless tobacco: Never Used   Substance Use Topics    Alcohol use: No     Alcohol/week: 0.0 oz       Allergies   Allergen Reactions    Codeine Other (comments)     GI upset    Penicillins Rash       Prior to Admission medications    Medication Sig Start Date End Date Taking? Authorizing Provider   vit C,G-Pk-lskil-lutein-zeaxan (PRESERVISION AREDS-2) 851-755-01-9 mg-unit-mg-mg cap Take  by mouth. Yes Provider, Historical   acetaminophen (TYLENOL) 325 mg tablet Take 650 mg by mouth every four (4) hours as needed for Pain.    Yes Provider, Historical   gabapentin (NEURONTIN) 100 mg capsule Take 1 Cap by mouth two (2) times daily as needed. 10/16/18  Yes Marsha Gonzalez MD   aspirin delayed-release 81 mg tablet Take 1 Tab by mouth every twelve (12) hours every twelve (12) hours. 8/8/18  Yes George Green PA   carvedilol (COREG) 6.25 mg tablet Take 1 Tab by mouth two (2) times daily (with meals). Patient taking differently: Take 6.25 mg by mouth two (2) times daily (with meals). Take morning of surgery per anesthesia guidelines.  5/31/18  Yes Marsha Gonzalez MD   DISABLED PLACARD ([address] [place] [address]) [place] -   Diagnosis - left knee arthritis M17.12 5/31/18   Marsha Gonzalez MD       Current Medications:  Current Facility-Administered Medications   Medication Dose Route Frequency Provider Last Rate Last Dose    calcium carbonate (TUMS) chewable tablet 200 mg [elemental]  200 mg Oral TID WITH MEALS Emily Terry MD   200 mg at 11/01/18 1639    HYDROcodone-acetaminophen (NORCO) 5-325 mg per tablet 1 Tab  1 Tab Oral Q4H PRN Emily Terry MD   1 Tab at 11/01/18 1529    carvedilol (COREG) tablet 6.25 mg  6.25 mg Oral BID WITH MEALS Alex Green PA   6.25 mg at 11/01/18 1638    alcohol 62% (NOZIN) nasal  1 Ampule  1 Ampule Topical Q12H Alex Green PA   1 Ampule at 11/01/18 0900    sodium chloride (NS) flush 5-10 mL  5-10 mL IntraVENous Q8H Alex Green PA   10 mL at 11/01/18 1529    sodium chloride (NS) flush 5-10 mL  5-10 mL IntraVENous PRN George Green PA        celecoxib (CELEBREX) capsule 200 mg  200 mg Oral Q12H Alex Green PA   200 mg at 11/01/18 0859    naloxone (NARCAN) injection 0.2-0.4 mg  0.2-0.4 mg IntraVENous Q10MIN PRN George Green PA        ondansetron (ZOFRAN ODT) tablet 4 mg  4 mg Oral Q4H PRN Alex Green PA   4 mg at 11/01/18 0806    zolpidem (AMBIEN) tablet 5 mg  5 mg Oral QHS PRN George Green PA        aspirin delayed-release tablet 81 mg  81 mg Oral Q12H George Green, [address] [place] 81 mg at 11/01/18 0859    HYDROmorphone (PF) (DILAUDID) injection 1 mg  1 mg IntraVENous Q3H PRN CEDRICK Ayala   1 mg at 11/01/18 1655    senna-docusate (PERICOLACE) 8.6-50 mg per tablet 2 Tab  2 Tab Oral DAILY Alex Green PA   2 Tab at 11/01/18 9789    diphenhydrAMINE (BENADRYL) capsule 25 mg  25 mg Oral Q4H PRN Radha Olsen MD            Review of Systems: Denies chest pain, shortness of breath, cough, headache, visual problems, abdominal pain, dysurea, calf pain. Pertinent positives are as noted in the medical records and unremarkable otherwise. Vital Signs:   Patient Vitals for the past 8 hrs:   BP Temp Pulse Resp SpO2   11/01/18 1516 115/64 98 °F (36.7 °C) 68 17 95 %        Physical Exam:   General: Alert and age appropriately oriented. No acute cardio respiratory distress. HEENT: Normocephalic. Oral mucosa moist without cyanosis. Lungs: Clear to auscultation  bilaterally. Respiration even and unlabored   Heart: Regular rate and rhythm, S1, S2   No  murmurs, clicks, rub or gallops   Abdomen: Soft, non-tender, nondistended. Bowel sounds present   Genitourinary: defered   Neuromuscular:      Grossly no focal neurological deficits noted. L Ankle dorsiflexion 5/5   L Ankle plantarflexion 5/5  R Ankle dorsiflexion 5/5   R Ankle plantarflexion 5/5  No sensory deficits. Skin/extremity: No rashes, no erythema. Calves soft. Wound covered.      Skin Incision(s)/Wound(s):        Lab Review:   Recent Results (from the past 72 hour(s))   TYPE & SCREEN    Collection Time: 10/30/18  8:36 AM   Result Value Ref Range    Crossmatch Expiration 11/02/2018     ABO/Rh(D) Halima Emelina POSITIVE     Antibody screen NEG    GLUCOSE, POC    Collection Time: 10/30/18  8:41 AM   Result Value Ref Range    Glucose (POC) 123 (H) 65 - 100 mg/dL   HEMOGLOBIN    Collection Time: 10/30/18  7:49 PM   Result Value Ref Range    HGB 10.7 (L) 11.7 - 15.4 g/dL   HEMOGLOBIN    Collection Time: 10/31/18  4:37 AM   Result Value Ref Range    HGB 9.5 (L) 11.7 - 15.4 g/dL   PLEASE READ & DOCUMENT PPD TEST IN 72 HRS    Collection Time: 10/31/18  9:00 AM   Result Value Ref Range    PPD  Negative    mm Induration  mm   HEMOGLOBIN    Collection Time: 11/01/18  5:05 AM   Result Value Ref Range    HGB 8.3 (L) 11.7 - 15.4 g/dL   PLEASE READ & DOCUMENT PPD TEST IN 48 HRS    Collection Time: 11/01/18  9:00 AM   Result Value Ref Range    PPD  Negative    mm Induration  mm       PT Initial  PT Most Recent   AROM: Within functional limits(R LE) (10/30/18 1400) Within functional limits(R LE) (10/30/18 1400)         Strength: Generally decreased, functional(R LE 3+/5) (10/30/18 1400) Generally decreased, functional(R LE 3+/5) (10/30/18 1400)               Sensation: Intact(R LE) (10/30/18 1400) Intact(R LE) (10/30/18 1400)         Distance (ft): 4 Feet (ft) (10/30/18 1400) 40 Feet (ft) (11/01/18 1400)   Assistive Device: Walker, rolling (10/30/18 1400) Walker, rolling (11/01/18 1400)       OT Initial OT Most Recent                                               ST Initial ST Most Recent                        Active Problems:    Arthritis of left knee (10/16/2018)          Condition on discharge :  good  Rehabilitation  potential : Good     Goals in Rehab : Patient to reach maximal rehabilitation potential in functional mobility,ambulation and ADL/ self care skills ; to improve strength and endurance    Expected Length Of Stay : Depending on pace of progress in mobility and self care in PT and OT ,therapies    Discharge Instructions:  Rehabilitation Management/ Medical Management: 1. Devices:Walkers, Type: Rolling Walker  2. Consult:Rehab team including PT, OT,  and . 3. Disposition Rehab-discussed with patient. 4. Thigh-high or knee-high ERIKA's when out of bed. 5. DVT Prophylaxis - aspirin 81mg bid x 30days. Please notify surgeon at [facility] if DVT diagnosed. 6. Incentive spirometer Q1H while awake  7.  Post op hemorrhagic anemia- monitor.  hgb 9.5-> 8.3, monitor. 8. Activity: WBAT LLE  9. Planned Labs: CBC,BMP  10. Pain Control: Continue scheduled tylenol, celebrex and  PRN meds. Continue current management. 11. Wound Care: May remove Aquacel 1 week post op and replace with Tegaderm and sterile gauze dressing. Keep wound clean and dry and reinforce dressing PRN. Remove staples 12-14 post surgery, when incision appears appropriately closed and apply benzoin and 1/2\" steristrips. 12. In case of complications: Please notify surgeon at [facility] if suspect infection, cellulitis, wound dehiscence or instrument failure, and if considering starting antibiotic. Follow up with ORTHO per instructions. [address] [place] [address] [phone]        Discharge Medications:      celecoxib (CELEBREX) capsule 200 mg- Route: Take 1 Cap by mouth every twelve (12) hours every twelve (12) hours. - Oral         HYDROcodone-acetaminophen (NORCO) 5/325 mg tablet 1 Tab  Ordered Dose: 5/325 mg Route: Oral Frequency: EVERY 4 HOURS  as needed for pain        calcium carbonate (TUMS) chewable tablet 200 mg [elemental]  Route: Take 1 Tab by mouth three (3) times daily as needed (with meals). - Oral       senna-docusate (PERICOLACE) 8.6-50 mg per tablet 2 Tab Ordered Dose: 2 Tab Route: Oral Frequency: DAILY       Current Discharge Medication List      CONTINUE these medications which have NOT CHANGED    Details   vit C,H-Zy-falyv-lutein-zeaxan (PRESERVISION AREDS-2) [id number] mg-unit-mg-mg cap Take  by mouth. acetaminophen (TYLENOL) 325 mg tablet Take 650 mg by mouth every four (4) hours as needed for Pain.      gabapentin (NEURONTIN) 100 mg capsule Take 1 Cap by mouth two (2) times daily as needed. aspirin delayed-release 81 mg tablet Take 1 Tab by mouth EVERY 12 HOURS x 30 days then stop. Take with food or milk or full glass of water.           carvedilol (COREG) 6.25 mg tablet Take 1 Tab by mouth two (2) times daily (with meals). Associated Diagnoses: HTN (hypertension), benign      DISABLED PLACARD (DISABLED PLACARD) [place] -   Diagnosis - left knee arthritis M17.12      Associated Diagnoses: Arthritis of knee, left             Discharge time: 35 minutes.     Signed By: Parag Ramos MD     November 1, 2018

## 2018-11-01 NOTE — PROGRESS NOTES
Pt assessment unchanged. Uneventful shift. Pt denies any n/v now. Pain to knee controlled. Pt voiding well. Pt family at bedside. inst pt to call for any needs.

## 2018-11-01 NOTE — PROGRESS NOTES
Problem: Mobility Impaired (Adult and Pediatric) Goal: *Acute Goals and Plan of Care (Insert Text) GOALS (1-4 days): 
(1.)Ms. Ralph Garibay will move from supine to sit and sit to supine  in bed with CONTACT GUARD ASSIST. 
(2.)Ms. Ralph Garibay will transfer from bed to chair and chair to bed with CONTACT GUARD ASSIST using the least restrictive device. MET 10/31/18 (3.)Ms. Ralph Garibay will ambulate with CONTACT GUARD ASSIST for 100 feet with the least restrictive device. MET 10/31/18 (4.)Ms. Ralph Garibay will ambulate up/down 4 steps with bilateral  railing with CONTACT GUARD ASSIST with no device. (5.)Ms. Ralph Garibay will increase left knee ROM to 5°-80°.  
________________________________________________________________________________________________ PHYSICAL THERAPY [facility]: Daily Note, PM 11/1/2018INPATIENT: Hospital Day: 3 [place] / [place] / Product Type: Medicare /  
  
NAME/AGE/GENDER: Ashkan Lopez is a 80 y.o. female PRIMARY DIAGNOSIS:  Primary osteoarthritis of left knee [M17.12] Procedure(s) and Anesthesia Type: 
   * LEFT KNEE ARTHROPLASTY TOTAL - Spinal (Left) ICD-10: Treatment Diagnosis:  
 · Pain in Left Knee (M25.562) · Stiffness of Left Knee, Not elsewhere classified (M25.662) · Other abnormalities of gait and mobility (R26.89) ASSESSMENT:  
Ms. Ralph Garibay is making good progress with gait and TK exercises. This section established at most recent assessment PROBLEM LIST (Impairments causing functional limitations): 1. Decreased Strength 2. Decreased ADL/Functional Activities 3. Decreased Transfer Abilities 4. Decreased Ambulation Ability/Technique 5. Decreased Balance 6. Increased Pain 7. Decreased Activity Tolerance 8. Increased Fatigue 9. Decreased Flexibility/Joint Mobility 10. Decreased Knowledge of Precautions 11.  Decreased [place] with Home Exercise Program 
 INTERVENTIONS PLANNED: (Benefits and precautions of physical therapy have been discussed with the patient.) 1. Balance Exercise 2. Bed Mobility 3. Cold 4. Gait Training 5. Home Exercise Program (HEP) 6. Therapeutic Exercise/Strengthening 7. Transfer Training 8. TKA education 9. Range of Motion: active/assisted/passive 10. Therapeutic Activities 11. Group Therapy TREATMENT PLAN: Frequency/Duration: Follow patient BID for duration of hospital stay to address above goals. Rehabilitation Potential For Stated Goals: Good RECOMMENDED REHABILITATION/EQUIPMENT: (at time of discharge pending progress): Continue Skilled Therapy and Home Health: Physical Therapy. HISTORY:  
History of Present Injury/Illness (Reason for Referral): S/P L TKA Past Medical History/Comorbidities: Ms. Radha Su  has a past medical history of Anemia, Heart murmur, HTN (hypertension), benign, Hypercholesteremia, Migraine, and Osteoarthritis. Ms. Radha Su  has a past surgical history that includes hx hysterectomy; hx refractive surgery; hx back surgery; hx orthopaedic; hx tonsillectomy; endoscopy, colon, diagnostic (10/3/12); hx appendectomy; hx hip replacement (Left, 08/2018); LEFT KNEE ARTHROPLASTY TOTAL (Left, 10/30/2018); and LEFT HIP ARTHROPLASTY TOTAL (Left, 8/7/2018). Social History/Living Environment:  
Home Environment: Private residence # Steps to Enter: 0 One/Two Story Residence: One story Living Alone: Yes Support Systems: Child(luciano) Patient Expects to be Discharged to[de-identified] Rehabilitation facility([facility]) Current DME Used/Available at Home: [employer]ane, straight, Tub transfer bench, Walker, rolling, Commode, bedside Tub or Shower Type: Tub/Shower combination Prior Level of Function/Work/Activity: 
Functionally I with ADLs and amb. Lives alone Number of Personal Factors/Comorbidities that affect the Plan of Care: 1-2: MODERATE COMPLEXITY EXAMINATION:  
Most Recent Physical Functioning: LLE AROM 
L Knee Flexion: 98 L Knee Extension: 5 Transfers Sit to Stand: Stand-by assistance Stand to Sit: Stand-by assistance Bed to Chair: Stand-by assistance Weight Bearing Status Left Side Weight Bearing: As tolerated Distance (ft): 40 Feet (ft) Ambulation - Level of Assistance: Stand-by assistance Assistive Device: Walker, rolling Speed/Rashmi: Pace decreased (<100 feet/min) Step Length: Left shortened;Right shortened Stance: Left decreased Gait Abnormalities: Antalgic;Decreased step clearance Interventions: Safety awareness training Braces/Orthotics:  
 
Left Knee Cold Type: Cryocuff Body Structures Involved: 1. Bones 2. Joints 3. Muscles Body Functions Affected: 1. Neuromusculoskeletal 
2. Movement Related Activities and Participation Affected: 1. General Tasks and Demands 2. Mobility 3. Self Care Number of elements that affect the Plan of Care: 4+: HIGH COMPLEXITY CLINICAL PRESENTATION:  
Presentation: Stable and uncomplicated: LOW COMPLEXITY CLINICAL DECISION MAKIN [facility] [address] AM-PAC 6 Clicks Basic Mobility Inpatient Short Form How much difficulty does the patient currently have. .. Unable A Lot A Little None 1. Turning over in bed (including adjusting bedclothes, sheets and blankets)? [] 1   [] 2   [x] 3   [] 4  
2. Sitting down on and standing up from a chair with arms ( e.g., wheelchair, bedside commode, etc.)   [] 1   [x] 2   [] 3   [] 4  
3. Moving from lying on back to sitting on the side of the bed? [] 1   [] 2   [x] 3   [] 4 How much help from another person does the patient currently need. .. Total A Lot A Little None 4. Moving to and from a bed to a chair (including a wheelchair)? [] 1   [x] 2   [] 3   [] 4  
5. Need to walk in hospital room? [] 1   [x] 2   [] 3   [] 4  
6. Climbing 3-5 steps with a railing?    [x] 1   [] 2   [] 3   [] 4  
 © 2007, Trustees of [facility] [employer], under license to [employer]. All rights reserved Score:  Initial: 13 Most Recent: X (Date: -- ) Interpretation of Tool:  Represents activities that are increasingly more difficult (i.e. Bed mobility, Transfers, Gait). Score 24 23 22-20 19-15 14-10 9-7 6 Modifier CH CI CJ CK CL CM CN   
 
? Mobility - Walking and Moving Around:  
  - CURRENT STATUS: CL - 60%-79% impaired, limited or restricted  - GOAL STATUS: CK - 40%-59% impaired, limited or restricted  - D/C STATUS:  ---------------To be determined--------------- [place] / [place] / Product Type: Medicare /   
 
Medical Necessity:    
· Patient demonstrates fair rehab potential due to higher previous functional level. Reason for Services/Other Comments: 
· Patient continues to require present interventions due to patient's inability to perform functional mobility at a CGA level. Use of outcome tool(s) and clinical judgement create a POC that gives a: Clear prediction of patient's progress: LOW COMPLEXITY  
  
 
 
 
TREATMENT:  
(In addition to Assessment/Re-Assessment sessions the following treatments were rendered) Pre-treatment Symptoms/Complaints:  Minimal complaints of pain 
Pain: Initial:  
Pain Intensity 1: 0  Post Session:    
 
Therapeutic Exercise: (15 Minutes):  Exercises per grid below to improve mobility, strength and coordination. Required minimal visual, verbal and tactile cues to promote proper body alignment and promote proper body breathing techniques. Progressed range, repetitions and complexity of movement as indicated. Gait Training (15 Minutes):  Gait training to improve and/or restore physical functioning as related to mobility, strength and balance.   Ambulated 40 Feet (ft) with Stand-by assistance using a Walker, rolling and minimal Safety awareness training related to their stance phase and stride length to promote proper body alignment, promote proper body posture and promote proper body mechanics. Date: 
10/30/18 Date: 
10/31/18 Date: 
11/1 ACTIVITY/EXERCISE AM PM AM PM AM PM  
GROUP THERAPY  []  []  []  [x]  [x]  [] Ankle Pumps  10 10 15 20 20 Quad Sets  10 10 15 20 20 Gluteal Sets  10 10 15 20 20 Hip ABd/ADduction  10AA 10 15 20 20 Straight Leg Raises  10AA 10 15 20 20 Knee Slides  10AA 10 15 20 20 Short Arc Quads  10AA  15 20 20 [facility] Chair Slides    15 20 20  
        
B = bilateral; AA = active assistive; A = active; P = passive Treatment/Session Assessment:   
 Response to Treatment:  She continue to make steady progress Education: 
[x] Home Exercises [x] Fall Precautions [] Hip Precautions [] D/C Instruction Review 
[] Knee/Hip Prosthesis Review [x] Walker Management/Safety [] Adaptive Equipment as Needed Interdisciplinary Collaboration:  
o Registered Nurse After treatment position/precautions:  
o Supine in bed 
o Bed/Chair-wheels locked 
o Call light within reach 
o RN notified Compliance with Program/Exercises: Will assess as treatment progresses. Recommendations/Intent for next treatment session:  Treatment next visit will focus on increasing Ms. Dye's independence with bed mobility, transfers, gait training, strength/ROM exercises, modalities for pain, and patient education. Total Treatment Duration: PT Patient Time In/Time Out Time In: 1330 Time Out: 1400 Ayleen Estrada, PTA

## 2018-11-01 NOTE — PROGRESS NOTES
Problem: Mobility Impaired (Adult and Pediatric) Goal: *Acute Goals and Plan of Care (Insert Text) GOALS (1-4 days): 
(1.)Ms. Taj Campo will move from supine to sit and sit to supine  in bed with CONTACT GUARD ASSIST. 
(2.)Ms. Taj Campo will transfer from bed to chair and chair to bed with CONTACT GUARD ASSIST using the least restrictive device. MET 10/31/18 (3.)Ms. Taj Campo will ambulate with CONTACT GUARD ASSIST for 100 feet with the least restrictive device. MET 10/31/18 (4.)Ms. Taj Campo will ambulate up/down 4 steps with bilateral  railing with CONTACT GUARD ASSIST with no device. (5.)Ms. Taj Campo will increase left knee ROM to 5°-80°.  
________________________________________________________________________________________________ PHYSICAL THERAPY [facility]: Daily Note, AM 11/1/2018INPATIENT: Hospital Day: 3 [place] / [place] / Product Type: Medicare /  
  
NAME/AGE/GENDER: Vee Thurston is a 80 y.o. female PRIMARY DIAGNOSIS:  Primary osteoarthritis of left knee [M17.12] Procedure(s) and Anesthesia Type: 
   * LEFT KNEE ARTHROPLASTY TOTAL - Spinal (Left) ICD-10: Treatment Diagnosis:  
 · Pain in Left Knee (M25.562) · Stiffness of Left Knee, Not elsewhere classified (M25.662) · Other abnormalities of gait and mobility (R26.89) ASSESSMENT:  
Ms. Taj Campo is making good progress with gait and TK exercises. She is going to rehab tomorrow. This section established at most recent assessment PROBLEM LIST (Impairments causing functional limitations): 1. Decreased Strength 2. Decreased ADL/Functional Activities 3. Decreased Transfer Abilities 4. Decreased Ambulation Ability/Technique 5. Decreased Balance 6. Increased Pain 7. Decreased Activity Tolerance 8. Increased Fatigue 9. Decreased Flexibility/Joint Mobility 10. Decreased Knowledge of Precautions 11.  Decreased Hayes with Home Exercise Program 
 INTERVENTIONS PLANNED: (Benefits and precautions of physical therapy have been discussed with the patient.) 1. Balance Exercise 2. Bed Mobility 3. Cold 4. Gait Training 5. Home Exercise Program (HEP) 6. Therapeutic Exercise/Strengthening 7. Transfer Training 8. TKA education 9. Range of Motion: active/assisted/passive 10. Therapeutic Activities 11. Group Therapy TREATMENT PLAN: Frequency/Duration: Follow patient BID for duration of hospital stay to address above goals. Rehabilitation Potential For Stated Goals: Good RECOMMENDED REHABILITATION/EQUIPMENT: (at time of discharge pending progress): Continue Skilled Therapy and Home Health: Physical Therapy. HISTORY:  
History of Present Injury/Illness (Reason for Referral): S/P L TKA Past Medical History/Comorbidities: Ms. Kristofer Malloy  has a past medical history of Anemia, Heart murmur, HTN (hypertension), benign, Hypercholesteremia, Migraine, and Osteoarthritis. Ms. Kristofer Malloy  has a past surgical history that includes hx hysterectomy; hx refractive surgery; hx back surgery; hx orthopaedic; hx tonsillectomy; endoscopy, colon, diagnostic (10/3/12); hx appendectomy; hx hip replacement (Left, 08/2018); LEFT KNEE ARTHROPLASTY TOTAL (Left, 10/30/2018); and LEFT HIP ARTHROPLASTY TOTAL (Left, 8/7/2018). Social History/Living Environment:  
Home Environment: Private residence # Steps to Enter: 0 One/Two Story Residence: One story Living Alone: Yes Support Systems: Child(luciano) Patient Expects to be Discharged to[de-identified] Rehabilitation facility([facility]) Current DME Used/Available at Home: Cane, straight, Tub transfer bench, Walker, rolling, Commode, bedside Tub or Shower Type: Tub/Shower combination Prior Level of Function/Work/Activity: 
Functionally I with ADLs and amb. Lives alone Number of Personal Factors/Comorbidities that affect the Plan of Care: 1-2: MODERATE COMPLEXITY EXAMINATION:  
Most Recent Physical Functioning: LLE AROM 
L Knee Flexion: 98 L Knee Extension: 5 Transfers Sit to Stand: Stand-by assistance Stand to Sit: Stand-by assistance Bed to Chair: Stand-by assistance Weight Bearing Status Left Side Weight Bearing: As tolerated Distance (ft): 135 Feet (ft)(x 2) Ambulation - Level of Assistance: Stand-by assistance Assistive Device: Walker, rolling Speed/Rashmi: Pace decreased (<100 feet/min) Step Length: Left shortened;Right shortened Stance: Left decreased Gait Abnormalities: Antalgic;Decreased step clearance Interventions: Safety awareness training Braces/Orthotics:  
 
Left Knee Cold Type: Cryocuff Body Structures Involved: 1. Bones 2. Joints 3. Muscles Body Functions Affected: 1. Neuromusculoskeletal 
2. Movement Related Activities and Participation Affected: 1. General Tasks and Demands 2. Mobility 3. Self Care Number of elements that affect the Plan of Care: 4+: HIGH COMPLEXITY CLINICAL PRESENTATION:  
Presentation: Stable and uncomplicated: LOW COMPLEXITY CLINICAL DECISION MAKING:  
[employer] AM-PAC 6 Clicks Basic Mobility Inpatient Short Form How much difficulty does the patient currently have. .. Unable A Lot A Little None 1. Turning over in bed (including adjusting bedclothes, sheets and blankets)? [] 1   [] 2   [x] 3   [] 4  
2. Sitting down on and standing up from a chair with arms ( e.g., wheelchair, bedside commode, etc.)   [] 1   [x] 2   [] 3   [] 4  
3. Moving from lying on back to sitting on the side of the bed? [] 1   [] 2   [x] 3   [] 4 How much help from another person does the patient currently need. .. Total A Lot A Little None 4. Moving to and from a bed to a chair (including a wheelchair)? [] 1   [x] 2   [] 3   [] 4  
5. Need to walk in hospital room? [] 1   [x] 2   [] 3   [] 4  
6. Climbing 3-5 steps with a railing?    [x] 1   [] 2   [] 3   [] 4  
 © 2007, Trustees of [facility] [employer], under license to [employer]. All rights reserved Score:  Initial: 13 Most Recent: X (Date: -- ) Interpretation of Tool:  Represents activities that are increasingly more difficult (i.e. Bed mobility, Transfers, Gait). Score 24 23 22-20 19-15 14-10 9-7 6 Modifier CH CI CJ CK CL CM CN   
 
? Mobility - Walking and Moving Around:  
  - CURRENT STATUS: CL - 60%-79% impaired, limited or restricted  - GOAL STATUS: CK - 40%-59% impaired, limited or restricted  - D/C STATUS:  ---------------To be determined--------------- [place] / [place] / Product Type: Medicare /   
 
Medical Necessity:    
· Patient demonstrates fair rehab potential due to higher previous functional level. Reason for Services/Other Comments: 
· Patient continues to require present interventions due to patient's inability to perform functional mobility at a CGA level. Use of outcome tool(s) and clinical judgement create a POC that gives a: Clear prediction of patient's progress: LOW COMPLEXITY  
  
 
 
 
TREATMENT:  
(In addition to Assessment/Re-Assessment sessions the following treatments were rendered) Pre-treatment Symptoms/Complaints:  Minimal complaints of pain 
Pain: Initial:  
Pain Intensity 1: 0  Post Session:  Not rated Therapeutic Exercise: (35 Minutes(group therapy)):  Exercises per grid below to improve mobility, strength and coordination. Required minimal visual, verbal and tactile cues to promote proper body alignment and promote proper body breathing techniques. Progressed range, repetitions and complexity of movement as indicated. Gait Training (15 Minutes):  Gait training to improve and/or restore physical functioning as related to mobility, strength and balance.   Ambulated 135 Feet (ft)(x 2) with Stand-by assistance using a Walker, rolling and minimal Safety awareness training related to their stance phase and stride length to promote proper body alignment, promote proper body posture and promote proper body mechanics. Date: 
10/30/18 Date: 
10/31/18 Date: 
11/1 ACTIVITY/EXERCISE AM PM AM PM AM PM  
GROUP THERAPY  []  []  []  [x]  [x]  [] Ankle Pumps  10 10 15 20 Quad Sets  10 10 15 20 Gluteal Sets  10 10 15 20 Hip ABd/ADduction  10AA 10 15 20 Straight Leg Raises  10AA 10 15 20 Knee Slides  10AA 10 15 20 Short Arc Quads  10AA  15 20 2 [facility] Chair Slides    15 20 B = bilateral; AA = active assistive; A = active; P = passive Treatment/Session Assessment:   
 Response to Treatment:  She continue to make steady progress Education: 
[x] Home Exercises [x] Fall Precautions [] Hip Precautions [] D/C Instruction Review 
[] Knee/Hip Prosthesis Review [x] Walker Management/Safety [] Adaptive Equipment as Needed Interdisciplinary Collaboration:  
o Registered Nurse After treatment position/precautions:  
o Up in chair 
o Bed/Chair-wheels locked 
o Call light within reach 
o RN notified Compliance with Program/Exercises: Will assess as treatment progresses. Recommendations/Intent for next treatment session:  Treatment next visit will focus on increasing Ms. Dye's independence with bed mobility, transfers, gait training, strength/ROM exercises, modalities for pain, and patient education. Total Treatment Duration: PT Patient Time In/Time Out Time In: 1030 Time Out: 1120 Ayleen Estrada, PTA

## 2018-11-02 VITALS
DIASTOLIC BLOOD PRESSURE: 58 MMHG | OXYGEN SATURATION: 90 % | SYSTOLIC BLOOD PRESSURE: 118 MMHG | TEMPERATURE: 97.6 F | RESPIRATION RATE: 18 BRPM | HEART RATE: 70 BPM

## 2018-11-02 LAB — HGB BLD-MCNC: 8.3 G/DL (ref 11.7–15.4)

## 2018-11-02 PROCEDURE — 97116 GAIT TRAINING THERAPY: CPT

## 2018-11-02 PROCEDURE — 36415 COLL VENOUS BLD VENIPUNCTURE: CPT

## 2018-11-02 PROCEDURE — 97150 GROUP THERAPEUTIC PROCEDURES: CPT

## 2018-11-02 PROCEDURE — 74011250637 HC RX REV CODE- 250/637: Performed by: ORTHOPAEDIC SURGERY

## 2018-11-02 PROCEDURE — 85018 HEMOGLOBIN: CPT

## 2018-11-02 PROCEDURE — 74011250637 HC RX REV CODE- 250/637: Performed by: PHYSICIAN ASSISTANT

## 2018-11-02 PROCEDURE — 99239 HOSP IP/OBS DSCHRG MGMT >30: CPT | Performed by: PHYSICAL MEDICINE & REHABILITATION

## 2018-11-02 RX ORDER — PROMETHAZINE HYDROCHLORIDE 25 MG/1
25 TABLET ORAL
Status: DISCONTINUED | OUTPATIENT
Start: 2018-11-02 | End: 2018-11-02 | Stop reason: HOSPADM

## 2018-11-02 RX ADMIN — HYDROCODONE BITARTRATE AND ACETAMINOPHEN 1 TABLET: 5; 325 TABLET ORAL at 01:10

## 2018-11-02 RX ADMIN — CELECOXIB 200 MG: 200 CAPSULE ORAL at 08:32

## 2018-11-02 RX ADMIN — Medication 1 AMPULE: at 08:33

## 2018-11-02 RX ADMIN — HYDROCODONE BITARTRATE AND ACETAMINOPHEN 1 TABLET: 5; 325 TABLET ORAL at 09:10

## 2018-11-02 RX ADMIN — PROMETHAZINE HYDROCHLORIDE 25 MG: 25 TABLET ORAL at 11:27

## 2018-11-02 RX ADMIN — HYDROCODONE BITARTRATE AND ACETAMINOPHEN 1 TABLET: 5; 325 TABLET ORAL at 13:37

## 2018-11-02 RX ADMIN — HYDROCODONE BITARTRATE AND ACETAMINOPHEN 1 TABLET: 5; 325 TABLET ORAL at 05:24

## 2018-11-02 RX ADMIN — CALCIUM CARBONATE 200 MG: 500 TABLET, CHEWABLE ORAL at 11:27

## 2018-11-02 RX ADMIN — CALCIUM CARBONATE 200 MG: 500 TABLET, CHEWABLE ORAL at 08:32

## 2018-11-02 RX ADMIN — CARVEDILOL 6.25 MG: 6.25 TABLET, FILM COATED ORAL at 08:33

## 2018-11-02 RX ADMIN — ONDANSETRON 4 MG: 8 TABLET, ORALLY DISINTEGRATING ORAL at 08:31

## 2018-11-02 RX ADMIN — SENNOSIDES AND DOCUSATE SODIUM 2 TABLET: 8.6; 5 TABLET ORAL at 08:32

## 2018-11-02 RX ADMIN — ASPIRIN 81 MG: 81 TABLET, DELAYED RELEASE ORAL at 08:32

## 2018-11-02 NOTE — PROGRESS NOTES
2018 Post Op day: 3 Days Post-Op Admit Diagnosis: Primary osteoarthritis of left knee [M17.12] LAB:   
Recent Results (from the past 24 hour(s)) PLEASE READ & DOCUMENT PPD TEST IN 48 HRS Collection Time: 18  9:00 AM  
Result Value Ref Range PPD  Negative  
 mm Induration  mm HEMOGLOBIN Collection Time: 18  4:52 AM  
Result Value Ref Range HGB 8.3 (L) 11.7 - 15.4 g/dL Vital Signs:   
Patient Vitals for the past 8 hrs: 
 BP Temp Pulse Resp SpO2  
18 0709 118/58 97.6 °F (36.4 °C) 70 18 90 % 18 0408 116/62 96.8 °F (36 °C) 66 16 95 % 18 0023 107/54 97.8 °F (36.6 °C) 65 16 92 % Temp (24hrs), Av.5 °F (36.4 °C), Min:96.8 °F (36 °C), Max:98 °F (36.7 °C) Pain Control:  
Pain Assessment Pain Scale 1: Numeric (0 - 10) Pain Intensity 1: 4 Pain Onset 1: 2018 Pain Location 1: Knee Pain Orientation 1: Left Pain Description 1: Aching, Constant, Sore, Sharp, Dull Pain Intervention(s) 1: Ambulation/Increased Activity, Cold pack, Elevation, Exercise, Repositioned Subjective: Doing well, no complaints Objective:  No Acute Distress, Alert and Oriented, Neurovascular exam is normal 
  
 
Assessment:  
Patient Active Problem List  
Diagnosis Code  Anemia D64.9  
 HTN (hypertension), benign I10  
 Hypercholesteremia E78.00  Migraine G43.909  Insomnia G47.00  Depression F32.9  Abnormal EKG R94.31  Systolic murmur X99.9  Essential hypertension with goal blood pressure less than 130/85 I10  Mixed hyperlipidemia E78.2  Arthritis of left hip M16.12  
 Arthritis of left knee M17.12 Status Post Procedure(s) (LRB): LEFT KNEE ARTHROPLASTY TOTAL (Left) Plan: Continue Physical Therapy, Monitor Hgb. Rehab today.   
Signed By: Holger Temple MD

## 2018-11-02 NOTE — ROUTINE PROCESS
TRANSFER - OUT REPORT: 
 
Verbal report given to Zora Montano RN(name) on Oscar Sanchez  being transferred to [facility](unit) for routine progression of care Report consisted of patients Situation, Background, Assessment and  
Recommendations(SBAR). Information from the following report(s) SBAR, Kardex, Procedure Summary, Intake/Output, MAR and Recent Results was reviewed with the receiving nurse. Lines:    
 
Opportunity for questions and clarification was provided. Patient transported with family by car

## 2018-11-02 NOTE — PROGRESS NOTES
Discharge instructions given and prescriptions reviewed and given to patient in packet to give to nurse at [facility]. Opportunity for questions and clarification provided. Patient verbalizes understanding. Patient discharged in stable condition to car via wheelchair.

## 2018-11-02 NOTE — DISCHARGE SUMMARY
REHABILITATION DISCHARGE SUMMARY     Patient: Rad Fitzgerald MRN: [id number]  SSN: [id number]    YOB: 1928  Age: 80 y.o. Sex: female      Date: 11/2/2018  Admit Date: 10/30/2018  Discharge Date: 11/2/2018    Admitting Physician: Patricia Paiz MD   Primary Care Physician: Ronnell Olvera MD     Admission Condition: good    Chief Complaint : Gait dysfunction secondary to below. Admit Diagnosis: Primary osteoarthritis of left knee [M17.12]  left total knee arthroplasty 10/30/2018  Pain  DVT risk  Post op acute blood loss anemia  Osteoarthritis  HTN (hypertension), benign  Acute Rehab Dx:  Gait impairment  Mobility and ambulation deficits  Self Care/ADL deficits     HPI: Rad Fitzgerald is a 80 y.o. female patient at [address] [place] [address] who was admitted on 10/30/2018  by Patricia Paiz MD with below mentioned medical history, is being seen for Physical Medicine and Rehabilitation. Rad Fitzgerald who presented with worseneing left knee pain due to end stage DJD underwent a left total knee arthroplasty per Dr. Patricia Paiz MD on 10/30/2018. The patient's post operative course has been uncomplicated. Pain, and common post op issues well tolerated. Patient is to be WBAT LLE. Patient is starting to stand, taking steps working with acute PT and OT. Patient is making expected gains with ambulation, mobility, and ROM. Patient shows significant functional deficits, gait dysfunciton due to knee pain, decreased ROM and strength. Rad Fitzgerald is seen and examined today. Medical Records reviewed. Patient denies any other pre morbid functional deficits.  Patient has been independent with ambulation, prior to admission, limited by left knee pain.       Rehabiitation Course:   Functional  Level On Admission: Walk: Modified Independent  Functional Level At Discharge: Walk: Contact Guard Assist  Home Architecture: Home Situation  Home Environment: Private residence (10/30/18 1455)  # Steps to Enter: 0 (10/30/18 1455)  One/Two Story Residence: One story (10/30/18 1455)  Living Alone: Yes (10/30/18 1455)  Support Systems: Child(luciano) (10/30/18 1455)  Patient Expects to be Discharged to[de-identified] Rehabilitation facility([facility]) (10/30/18 1455)  Current DME Used/Available at Home: Isa Castle, straight;Tub transfer bench;Walker, rolling;Commode, bedside (10/30/18 1455)  Tub or Shower Type: Tub/Shower combination (10/30/18 1455)     Past Medical History:   Diagnosis Date    Anemia     EGD 10-3-12:  schatzki's ring, duodenal ulcer, gastritis. Colonoscopy 10-3-12: diverticulosis, colon polyp. repeat colonoscopy 10 years    Heart murmur     per cardiology note: Murmur: \"3/6 high pitched apical  systolic murmur\"; ECHO done 8/3/18    HTN (hypertension), benign     Hypercholesteremia     no meds    Migraine     Osteoarthritis       Past Surgical History:   Procedure Laterality Date    ENDOSCOPY, COLON, DIAGNOSTIC  10/3/12    diverticulosis, colon polyp    HX APPENDECTOMY      HX BACK SURGERY      HX HIP REPLACEMENT Left 08/2018    HX HYSTERECTOMY      HX ORTHOPAEDIC      right wrist, left shoulder    HX REFRACTIVE SURGERY      HX TONSILLECTOMY        Family History   Problem Relation Age of Onset    Cancer Sister         breast      Social History     Tobacco Use    Smoking status: Never Smoker    Smokeless tobacco: Never Used   Substance Use Topics    Alcohol use: No     Alcohol/week: 0.0 oz       Allergies   Allergen Reactions    Codeine Other (comments)     GI upset    Penicillins Rash       Prior to Admission medications    Medication Sig Start Date End Date Taking? Authorizing Provider   vit C,F-Oi-amewm-lutein-zeaxan (PRESERVISION AREDS-2) 555-877-91-3 mg-unit-mg-mg cap Take  by mouth. Yes Provider, Historical   acetaminophen (TYLENOL) 325 mg tablet Take 650 mg by mouth every four (4) hours as needed for Pain.    Yes Provider, Historical   gabapentin (NEURONTIN) 100 mg capsule Take 1 Cap by mouth two (2) times daily as needed. 10/16/18  Yes Ronnell Olvera MD   aspirin delayed-release 81 mg tablet Take 1 Tab by mouth every twelve (12) hours every twelve (12) hours. 8/8/18  Yes Isa Green PA   carvedilol (COREG) 6.25 mg tablet Take 1 Tab by mouth two (2) times daily (with meals). Patient taking differently: Take 6.25 mg by mouth two (2) times daily (with meals). Take morning of surgery per anesthesia guidelines.  5/31/18  Yes Ronnell Olvera MD   DISABLED PLACARD ([address] [place] [address]) [place] tag -   Diagnosis - left knee arthritis M17.12 5/31/18   Ronnell Olvera MD       Current Medications:  Current Facility-Administered Medications   Medication Dose Route Frequency Provider Last Rate Last Dose    promethazine (PHENERGAN) tablet 25 mg  25 mg Oral Q6H PRN Alirio Waite MD   25 mg at 11/02/18 1127    calcium carbonate (TUMS) chewable tablet 200 mg [elemental]  200 mg Oral TID WITH MEALS Alirio Waite MD   200 mg at 11/02/18 1127    HYDROcodone-acetaminophen (NORCO) 5-325 mg per tablet 1 Tab  1 Tab Oral Q4H PRN Alirio Waite MD   1 Tab at 11/02/18 0910    carvedilol (COREG) tablet 6.25 mg  6.25 mg Oral BID WITH MEALS Alex Green PA   6.25 mg at 11/02/18 3287    alcohol 62% (NOZIN) nasal  1 Ampule  1 Ampule Topical Q12H Alex Green PA   1 Ampule at 11/02/18 9758    sodium chloride (NS) flush 5-10 mL  5-10 mL IntraVENous Q8H Alex Green PA   10 mL at 11/01/18 1529    sodium chloride (NS) flush 5-10 mL  5-10 mL IntraVENous PRN Isa Green PA        celecoxib (CELEBREX) capsule 200 mg  200 mg Oral Q12H Alex Green PA   200 mg at 11/02/18 4436    naloxone (NARCAN) injection 0.2-0.4 mg  0.2-0.4 mg IntraVENous Q10MIN PRN Isa Green PA        ondansetron (ZOFRAN ODT) tablet 4 mg  4 mg Oral Q4H PRN Alex Green PA   4 mg at 11/02/18 0831    zolpidem (AMBIEN) tablet 5 mg  5 mg Oral QHS PRN Gearshital Katz, [place]        aspirin delayed-release tablet 81 mg  81 mg Oral Q12H Alex Green [place]   81 mg at 11/02/18 6109    HYDROmorphone (PF) (DILAUDID) injection 1 mg  1 mg IntraVENous Q3H PRN CEDRICK Estrada   1 mg at 11/01/18 2318    senna-docusate (PERICOLACE) 8.6-50 mg per tablet 2 Tab  2 Tab Oral DAILY CEDRICK Pedraza   2 Tab at 11/02/18 1535    diphenhydrAMINE (BENADRYL) capsule 25 mg  25 mg Oral Q4H PRN Chapis Steel MD            Review of Systems: Denies chest pain, shortness of breath, cough, headache, visual problems, abdominal pain, dysurea, calf pain. Pertinent positives are as noted in the medical records and unremarkable otherwise. Vital Signs:   Patient Vitals for the past 8 hrs:   BP Temp Pulse Resp SpO2   11/02/18 0709 118/58 97.6 °F (36.4 °C) 70 18 90 %   11/02/18 0408 116/62 96.8 °F (36 °C) 66 16 95 %        Physical Exam:   General: Alert and age appropriately oriented. No acute cardio respiratory distress. HEENT: Normocephalic. Oral mucosa moist without cyanosis. Lungs: Clear to auscultation  bilaterally. Respiration even and unlabored   Heart: Regular rate and rhythm, S1, S2   No  murmurs, clicks, rub or gallops   Abdomen: Soft, non-tender, nondistended. Bowel sounds present   Genitourinary: defered   Neuromuscular:      Grossly no focal neurological deficits noted. L Ankle dorsiflexion 5/5   L Ankle plantarflexion 5/5  R Ankle dorsiflexion 5/5   R Ankle plantarflexion 5/5  No sensory deficits. Skin/extremity: No rashes, no erythema. Calves soft. Wound covered.      Skin Incision(s)/Wound(s):        Lab Review:   Recent Results (from the past 72 hour(s))   HEMOGLOBIN    Collection Time: 10/30/18  7:49 PM   Result Value Ref Range    HGB 10.7 (L) 11.7 - 15.4 g/dL   HEMOGLOBIN    Collection Time: 10/31/18  4:37 AM   Result Value Ref Range    HGB 9.5 (L) 11.7 - 15.4 g/dL   PLEASE READ & DOCUMENT PPD TEST IN 72 HRS    Collection Time: 10/31/18 9:00 AM   Result Value Ref Range    PPD  Negative    mm Induration  mm   HEMOGLOBIN    Collection Time: 11/01/18  5:05 AM   Result Value Ref Range    HGB 8.3 (L) 11.7 - 15.4 g/dL   PLEASE READ & DOCUMENT PPD TEST IN 48 HRS    Collection Time: 11/01/18  9:00 AM   Result Value Ref Range    PPD  Negative    mm Induration  mm   HEMOGLOBIN    Collection Time: 11/02/18  4:52 AM   Result Value Ref Range    HGB 8.3 (L) 11.7 - 15.4 g/dL       PT Initial  PT Most Recent   AROM: Within functional limits(R LE) (10/30/18 1400) Within functional limits(R LE) (10/30/18 1400)         Strength: Generally decreased, functional(R LE 3+/5) (10/30/18 1400) Generally decreased, functional(R LE 3+/5) (10/30/18 1400)               Sensation: Intact(R LE) (10/30/18 1400) Intact(R LE) (10/30/18 1400)         Distance (ft): 4 Feet (ft) (10/30/18 1400) 128 Feet (ft) (11/02/18 1100)   Assistive Device: Walker, rolling (10/30/18 1400) Walker, rolling (11/02/18 1100)       OT Initial OT Most Recent                                               ST Initial ST Most Recent                        Active Problems:    Arthritis of left knee (10/16/2018)          Condition on discharge :  good  Rehabilitation  potential : Good     Goals in Rehab : Patient to reach maximal rehabilitation potential in functional mobility,ambulation and ADL/ self care skills ; to improve strength and endurance    Expected Length Of Stay : Depending on pace of progress in mobility and self care in PT and OT ,therapies    Discharge Instructions:  Rehabilitation Management/ Medical Management: 1. Devices:Walkers, Type: Rolling Walker  2. Consult:Rehab team including PT, OT,  and . 3. Disposition Rehab-discussed with patient. 4. Thigh-high or knee-high ERIKA's when out of bed. 5. DVT Prophylaxis - aspirin 81mg bid x 30days. Please notify surgeon at [facility] if DVT diagnosed. 6. Incentive spirometer Q1H while awake  7.  Post op hemorrhagic anemia- monitor.  hgb 9.5-> 8.3, monitor. 8. Activity: WBAT LLE  9. Planned Labs: CBC,BMP  10. Pain Control: Continue scheduled tylenol, celebrex and  PRN meds. Continue current management. 11. Wound Care: May remove Aquacel 1 week post op and replace with Tegaderm and sterile gauze dressing. Keep wound clean and dry and reinforce dressing PRN. Remove staples 12-14 post surgery, when incision appears appropriately closed and apply benzoin and 1/2\" steristrips. 12. In case of complications: Please notify surgeon at [facility] if suspect infection, cellulitis, wound dehiscence or instrument failure, and if considering starting antibiotic. Follow up with ORTHO per instructions. [address] [place] [address] [phone]        Discharge Medications:      celecoxib (CELEBREX) capsule 200 mg- Route: Take 1 Cap by mouth every twelve (12) hours every twelve (12) hours. - Oral         HYDROcodone-acetaminophen (NORCO) 5/325 mg tablet 1 Tab  Ordered Dose: 5/325 mg Route: Oral Frequency: EVERY 4 HOURS  as needed for pain        calcium carbonate (TUMS) chewable tablet 200 mg [elemental]  Route: Take 1 Tab by mouth three (3) times daily as needed (with meals). - Oral       senna-docusate (PERICOLACE) 8.6-50 mg per tablet 2 Tab Ordered Dose: 2 Tab Route: Oral Frequency: DAILY       Current Discharge Medication List      CONTINUE these medications which have NOT CHANGED    Details   vit C,T-Qi-ebxac-lutein-zeaxan (PRESERVISION AREDS-2) [id number] mg-unit-mg-mg cap Take  by mouth. acetaminophen (TYLENOL) 325 mg tablet Take 650 mg by mouth every four (4) hours as needed for Pain.      gabapentin (NEURONTIN) 100 mg capsule Take 1 Cap by mouth two (2) times daily as needed. aspirin delayed-release 81 mg tablet Take 1 Tab by mouth EVERY 12 HOURS x 30 days then stop. Take with food or milk or full glass of water. carvedilol (COREG) 6.25 mg tablet Take 1 Tab by mouth two (2) times daily (with meals). Associated Diagnoses: HTN (hypertension), benign      DISABLED PLACARD (DISABLED PLACARD) [place] -   Diagnosis - left knee arthritis M17.12      Associated Diagnoses: Arthritis of knee, left             Discharge time: 35 minutes.     Signed By: Edd Jaramillo MD     November 2, 2018

## 2018-11-02 NOTE — PROGRESS NOTES
Pt is alert and oriented x3. No NV deficits noted. Pt is able to dorsi/ plantar flex and has +2 pedal pulses. Dressing to surgical incision is dry, and intact with scant amount of old drainage. Pain is controlled at this time. Bed is low and locked, call light in reach. IS at bedside and pt demonstrated use. No needs stated.

## 2018-11-02 NOTE — PROGRESS NOTES
SW called [facility] with [employer], [facility] advising that patient would be discharging to there today, requested room number. Packet prepared with everything except discharge summaries, which are not available as of yet. Number for report provided. ISAURO Bergeron  [address] [email or web address]

## 2018-11-05 ENCOUNTER — PATIENT OUTREACH (OUTPATIENT)
Dept: CASE MANAGEMENT | Age: 83
End: 2018-11-05

## 2018-11-05 NOTE — PROGRESS NOTES
This note will not be viewable in [address] Patient discharged to the [facility] on 11/2/18. [facility] outreach postponed for 21 days due to discharge to non-preferred network SNF.

## 2018-11-26 ENCOUNTER — PATIENT OUTREACH (OUTPATIENT)
Dept: CASE MANAGEMENT | Age: 83
End: 2018-11-26

## 2018-11-26 NOTE — PROGRESS NOTES
This note will not be viewable in [address] Attempt to reach patient for EMMETT MCCURDY following discharge from [facility] at the [facility]. Left message. Will attempt second outreach within 24 hours.

## 2018-11-27 ENCOUNTER — PATIENT OUTREACH (OUTPATIENT)
Dept: CASE MANAGEMENT | Age: 83
End: 2018-11-27

## 2018-11-27 NOTE — PROGRESS NOTES
This note will not be viewable in [address] Second attempt for [facility] out reach following discharge from [facility] at the [address] Patient stated her daughter, Sara Sales, is taking her to the doctor this morning and requests a call back and speak with Sara Sales this afternoon. Will return call as requested later this afternoon.

## 2018-11-27 NOTE — PROGRESS NOTES
This note will not be viewable in [address] Third [facility] outreach attempt made to home and/or cell numbers. Left message to return calls. Unable to reach for [facility] program, will close case. Will reopen if call is returned.

## 2021-01-25 ENCOUNTER — HOSPITAL ENCOUNTER (OUTPATIENT)
Dept: LAB | Age: 86
Discharge: HOME | End: 2021-01-25
Payer: MEDICARE

## 2021-01-25 LAB
BASOPHILS # BLD: 0.1 K/UL (ref 0–0.2)
BASOPHILS NFR BLD: 1 % (ref 0–2)
CRP SERPL-MCNC: 0.7 MG/DL (ref 0–0.9)
DIFFERENTIAL METHOD BLD: ABNORMAL
EOSINOPHIL # BLD: 0.1 K/UL (ref 0–0.8)
EOSINOPHIL NFR BLD: 2 % (ref 0.5–7.8)
ERYTHROCYTE [DISTWIDTH] IN BLOOD BY AUTOMATED COUNT: 15.6 % (ref 11.9–14.6)
ERYTHROCYTE [SEDIMENTATION RATE] IN BLOOD: 26 MM/HR (ref 0–30)
HCT VFR BLD AUTO: 35 % (ref 35.8–46.3)
HGB BLD-MCNC: 10.7 G/DL (ref 11.7–15.4)
IMM GRANULOCYTES # BLD AUTO: 0 K/UL (ref 0–0.5)
IMM GRANULOCYTES NFR BLD AUTO: 1 % (ref 0–5)
LYMPHOCYTES # BLD: 1.7 K/UL (ref 0.5–4.6)
LYMPHOCYTES NFR BLD: 22 % (ref 13–44)
MCH RBC QN AUTO: 27.4 PG (ref 26.1–32.9)
MCHC RBC AUTO-ENTMCNC: 30.6 G/DL (ref 31.4–35)
MCV RBC AUTO: 89.7 FL (ref 79.6–97.8)
MONOCYTES # BLD: 0.7 K/UL (ref 0.1–1.3)
MONOCYTES NFR BLD: 10 % (ref 4–12)
NEUTS SEG # BLD: 5.1 K/UL (ref 1.7–8.2)
NEUTS SEG NFR BLD: 66 % (ref 43–78)
NRBC # BLD: 0 K/UL (ref 0–0.2)
PLATELET # BLD AUTO: 286 K/UL (ref 150–450)
PMV BLD AUTO: 10.1 FL (ref 9.4–12.3)
RBC # BLD AUTO: 3.9 M/UL (ref 4.05–5.2)
WBC # BLD AUTO: 7.8 K/UL (ref 4.3–11.1)

## 2021-01-25 PROCEDURE — 86140 C-REACTIVE PROTEIN: CPT

## 2021-01-25 PROCEDURE — 85652 RBC SED RATE AUTOMATED: CPT

## 2021-01-25 PROCEDURE — 36415 COLL VENOUS BLD VENIPUNCTURE: CPT

## 2021-01-25 PROCEDURE — 85025 COMPLETE CBC W/AUTO DIFF WBC: CPT

## 2022-03-18 PROBLEM — R01.1 SYSTOLIC MURMUR: Status: ACTIVE | Noted: 2018-08-02

## 2022-03-18 PROBLEM — R94.31 ABNORMAL EKG: Status: ACTIVE | Noted: 2018-08-02

## 2022-03-19 PROBLEM — E78.2 MIXED HYPERLIPIDEMIA: Status: ACTIVE | Noted: 2018-08-02

## 2022-03-19 PROBLEM — M16.12 ARTHRITIS OF LEFT HIP: Status: ACTIVE | Noted: 2018-08-07

## 2022-03-19 PROBLEM — M17.12 ARTHRITIS OF LEFT KNEE: Status: ACTIVE | Noted: 2018-10-16

## 2023-09-13 ENCOUNTER — APPOINTMENT (OUTPATIENT)
Dept: CT IMAGING | Age: 88
End: 2023-09-13
Payer: MEDICARE

## 2023-09-13 ENCOUNTER — HOSPITAL ENCOUNTER (EMERGENCY)
Age: 88
Discharge: HOME | End: 2023-09-13
Attending: STUDENT IN AN ORGANIZED HEALTH CARE EDUCATION/TRAINING PROGRAM
Payer: MEDICARE

## 2023-09-13 VITALS
DIASTOLIC BLOOD PRESSURE: 69 MMHG | RESPIRATION RATE: 18 BRPM | HEIGHT: 66 IN | TEMPERATURE: 97.4 F | WEIGHT: 170 LBS | OXYGEN SATURATION: 99 % | BODY MASS INDEX: 27.32 KG/M2 | HEART RATE: 68 BPM | SYSTOLIC BLOOD PRESSURE: 153 MMHG

## 2023-09-13 DIAGNOSIS — M54.41 ACUTE RIGHT-SIDED LOW BACK PAIN WITH RIGHT-SIDED SCIATICA: Primary | ICD-10-CM

## 2023-09-13 PROCEDURE — 72131 CT LUMBAR SPINE W/O DYE: CPT

## 2023-09-13 PROCEDURE — 96372 THER/PROPH/DIAG INJ SC/IM: CPT

## 2023-09-13 PROCEDURE — 6370000000 HC RX 637 (ALT 250 FOR IP): Performed by: STUDENT IN AN ORGANIZED HEALTH CARE EDUCATION/TRAINING PROGRAM

## 2023-09-13 PROCEDURE — 99284 EMERGENCY DEPT VISIT MOD MDM: CPT

## 2023-09-13 PROCEDURE — 6360000002 HC RX W HCPCS: Performed by: STUDENT IN AN ORGANIZED HEALTH CARE EDUCATION/TRAINING PROGRAM

## 2023-09-13 RX ORDER — ONDANSETRON 4 MG/1
4 TABLET, ORALLY DISINTEGRATING ORAL
Status: COMPLETED | OUTPATIENT
Start: 2023-09-13 | End: 2023-09-13

## 2023-09-13 RX ORDER — MORPHINE SULFATE 2 MG/ML
2 INJECTION, SOLUTION INTRAMUSCULAR; INTRAVENOUS ONCE
Status: COMPLETED | OUTPATIENT
Start: 2023-09-13 | End: 2023-09-13

## 2023-09-13 RX ORDER — LIDOCAINE 50 MG/G
1 PATCH TOPICAL DAILY
Qty: 10 PATCH | Refills: 0 | Status: SHIPPED | OUTPATIENT
Start: 2023-09-13 | End: 2023-09-23

## 2023-09-13 RX ORDER — HYDROCODONE BITARTRATE AND ACETAMINOPHEN 5; 325 MG/1; MG/1
1 TABLET ORAL
Status: COMPLETED | OUTPATIENT
Start: 2023-09-13 | End: 2023-09-13

## 2023-09-13 RX ORDER — HYDROCODONE BITARTRATE AND ACETAMINOPHEN 5; 325 MG/1; MG/1
1 TABLET ORAL EVERY 6 HOURS PRN
Qty: 10 TABLET | Refills: 0 | Status: SHIPPED | OUTPATIENT
Start: 2023-09-13 | End: 2023-09-16

## 2023-09-13 RX ADMIN — ONDANSETRON 4 MG: 4 TABLET, ORALLY DISINTEGRATING ORAL at 18:50

## 2023-09-13 RX ADMIN — HYDROCODONE BITARTRATE AND ACETAMINOPHEN 1 TABLET: 5; 325 TABLET ORAL at 18:50

## 2023-09-13 RX ADMIN — MORPHINE SULFATE 2 MG: 2 INJECTION, SOLUTION INTRAMUSCULAR; INTRAVENOUS at 20:31

## 2023-09-13 ASSESSMENT — PAIN DESCRIPTION - LOCATION
LOCATION: BACK
LOCATION: BACK

## 2023-09-13 ASSESSMENT — LIFESTYLE VARIABLES: HOW OFTEN DO YOU HAVE A DRINK CONTAINING ALCOHOL: NEVER

## 2023-09-13 ASSESSMENT — PAIN SCALES - GENERAL
PAINLEVEL_OUTOF10: 10

## 2023-09-13 ASSESSMENT — PAIN - FUNCTIONAL ASSESSMENT: PAIN_FUNCTIONAL_ASSESSMENT: 0-10

## 2023-09-13 ASSESSMENT — PAIN DESCRIPTION - ORIENTATION: ORIENTATION: LOWER;RIGHT

## 2023-09-13 NOTE — ED PROVIDER NOTES
Emergency Department Provider Note       PCP: Lianet Thibodeaux MD   Age: 80 y.o. Sex: female     DISPOSITION Decision To Discharge 09/13/2023 08:03:05 PM       ICD-10-CM    1. Acute right-sided low back pain with right-sided sciatica  M54.41 HYDROcodone-acetaminophen (NORCO) 5-325 MG per tablet          Medical Decision Making     Complexity of Problems Addressed:  Complexity of Problem: 1 acute, uncomplicated illness or injury. Data Reviewed and Analyzed:  I independently ordered and reviewed each unique test.  I reviewed external records: provider visit note from PCP. The patients assessment rimportant historical information not provided by the patientequired an independent historian: daughter. The reason they were needed is important historical information not provided by the patient. I interpreted the CT Scan. No obvious displaced fracture of the spine    Discussion of management or test interpretation. 80-year-old female presents to the emergency department with family at bedside. Patient reports right-sided low back pain has been present for the past 2 weeks after lifting her 25 pound dog. States symptoms have been stable. Reports while moving a couch cover on today she began to have a sharp and significant increase of her symptoms. Now has pain running down her right lower extremity. No numbness or weakness to lower extremities. No saddle anesthesia. No urinary retention or incontinence. Took ibuprofen prior to arrival as well as using a lidocaine patch. Will give Norco as well as Zofran. Will obtain CT scan to rule out emergent findings. CT scan showed no obvious fracture. Patient has no cauda equina syndrome symptoms. He does report some improvement of symptoms after Norco but not resolution of her pain. Will give IM morphine. Patient will be prescribed Norco as well as lidocaine patches. She was encouraged to follow-up with her family physician this week.   Given strict return

## 2023-09-13 NOTE — ED TRIAGE NOTES
Pt developed lower back pain two weeks ago after carrying her dog outside. Today, pt was putting a cover on the couch when she felt a sharp pain on the right side that is now radiating to her knee.

## 2023-09-14 NOTE — DISCHARGE INSTRUCTIONS
Continue alternating Tylenol and Motrin as needed for back pain. Use Norco for severe, breakthrough pain. Use caution as this medication can be sedating. You cannot drive or operate heavy machinery while taking. Take a stool softener daily. Continue using lidocaine patches as well. Follow-up with your family physician within 1 week. Return to the ER for worsening or worrisome symptoms.

## 2023-09-15 ENCOUNTER — HOSPITAL ENCOUNTER (EMERGENCY)
Age: 88
Discharge: HOME | End: 2023-09-15
Attending: EMERGENCY MEDICINE
Payer: MEDICARE

## 2023-09-15 ENCOUNTER — APPOINTMENT (OUTPATIENT)
Dept: CT IMAGING | Age: 88
End: 2023-09-15
Payer: MEDICARE

## 2023-09-15 VITALS
OXYGEN SATURATION: 97 % | HEART RATE: 68 BPM | DIASTOLIC BLOOD PRESSURE: 76 MMHG | TEMPERATURE: 97.6 F | BODY MASS INDEX: 27.32 KG/M2 | WEIGHT: 170 LBS | HEIGHT: 66 IN | RESPIRATION RATE: 19 BRPM | SYSTOLIC BLOOD PRESSURE: 165 MMHG
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
## 2023-09-15 DIAGNOSIS — R20.2 PARESTHESIA OF RIGHT LOWER EXTREMITY: Primary | ICD-10-CM

## 2023-09-15 PROCEDURE — 99284 EMERGENCY DEPT VISIT MOD MDM: CPT

## 2023-09-15 PROCEDURE — 72131 CT LUMBAR SPINE W/O DYE: CPT

## 2023-09-15 RX ORDER — METHYLPREDNISOLONE 4 MG/1
TABLET ORAL
Qty: 1 KIT | Refills: 0 | Status: SHIPPED | OUTPATIENT
Start: 2023-09-15 | End: 2023-09-21

## 2023-09-15 ASSESSMENT — PAIN - FUNCTIONAL ASSESSMENT: PAIN_FUNCTIONAL_ASSESSMENT: 0-10

## 2023-09-15 ASSESSMENT — LIFESTYLE VARIABLES
HOW OFTEN DO YOU HAVE A DRINK CONTAINING ALCOHOL: NEVER
HOW MANY STANDARD DRINKS CONTAINING ALCOHOL DO YOU HAVE ON A TYPICAL DAY: PATIENT DOES NOT DRINK

## 2023-09-15 ASSESSMENT — PAIN SCALES - GENERAL: PAINLEVEL_OUTOF10: 8

## 2023-09-15 NOTE — ED TRIAGE NOTES
Patient brought into triage via wheelchair. Patient states she had a fall last night. Patient states that her R hip feels numb and radiates down her R lower extremity. Patient states this is the reason she fell.

## 2023-09-15 NOTE — DISCHARGE INSTRUCTIONS
CT imaging of the lumbar spine did not reveal any acute traumatic changes resulting from the fall compared to your prior imaging. Your paresthesias (tingling and numbness) is again likely secondary to sciatic compression as before. We will trial a taper of steroids to hopefully improve your symptoms. I recommend following up with your primary doctor next week. Please return any worsening symptoms or concerns in the interim.

## 2023-09-15 NOTE — ED PROVIDER NOTES
no abdominal tenderness. Musculoskeletal:         General: Tenderness present. Normal range of motion. Cervical back: Normal range of motion and neck supple. Comments: Midline lumbar tenderness, approximately level L5-S1   Skin:     General: Skin is warm and dry. Neurological:      General: No focal deficit present. Mental Status: She is alert and oriented to person, place, and time. Cranial Nerves: No cranial nerve deficit. Sensory: No sensory deficit. Motor: No weakness. Coordination: Coordination normal.      Gait: Gait normal.   Psychiatric:         Mood and Affect: Mood normal.         Behavior: Behavior normal.          Procedures     Procedures    Orders Placed This Encounter   Procedures    CT LUMBAR SPINE WO CONTRAST        Medications given during this emergency department visit:  Medications - No data to display    Discharge Medication List as of 9/15/2023  1:05 PM        START taking these medications    Details   methylPREDNISolone (MEDROL, CJ,) 4 MG tablet Take by mouth., Disp-1 kit, R-0Print              Past Medical History:   Diagnosis Date    Anemia     EGD 10-3-12:  schatzki's ring, duodenal ulcer, gastritis. Colonoscopy 10-3-12: diverticulosis, colon polyp. repeat colonoscopy 10 years    Heart murmur     per cardiology note: Murmur: \"3/6 high pitched apical  systolic murmur\"; ECHO done 8/3/18    HTN (hypertension), benign     Hypercholesteremia     no meds    Migraine     Osteoarthritis         Past Surgical History:   Procedure Laterality Date    APPENDECTOMY      BACK SURGERY      COLONOSCOPY  10/3/12    diverticulosis, colon polyp    HYSTERECTOMY (CERVIX STATUS UNKNOWN)      KNEE SURGERY Left     ORTHOPEDIC SURGERY      right wrist, left shoulder    REFRACTIVE SURGERY      TONSILLECTOMY      TOTAL HIP ARTHROPLASTY Left 08/2018        Social History     Socioeconomic History    Marital status:     Tobacco Use    Smoking status: Never    Smokeless tobacco: Never   Substance and Sexual Activity    Alcohol use: No     Alcohol/week: 0.0 standard drinks of alcohol    Drug use: No   Social History Narrative    No family/social/cultural issues pertinent to care.  May 2015        Discharge Medication List as of 9/15/2023  1:05 PM        CONTINUE these medications which have NOT CHANGED    Details   lidocaine (LIDODERM) 5 % Place 1 patch onto the skin daily for 10 days 12 hours on, 12 hours off., Disp-10 patch, R-0Print      HYDROcodone-acetaminophen (NORCO) 5-325 MG per tablet Take 1 tablet by mouth every 6 hours as needed for Pain for up to 3 days. Intended supply: 3 days. Take lowest dose possible to manage pain Max Daily Amount: 4 tablets, Disp-10 tablet, R-0Print      aspirin 81 MG chewable tablet Take 81 mg by mouth dailyHistorical Med      atorvastatin (LIPITOR) 20 MG tablet Take by mouth dailyHistorical Med      furosemide (LASIX) 20 MG tablet Take 20 mg by mouth daily as neededHistorical Med      gabapentin (NEURONTIN) 100 MG capsule Take by mouth 3 times daily. Historical Med      mirtazapine (REMERON) 30 MG tablet Take by mouthHistorical Med              Results for orders placed or performed during the hospital encounter of 09/15/23   CT LUMBAR SPINE WO CONTRAST    Narrative    Exam: CT LUMBAR SPINE WO CONTRAST on 9/15/2023 12:13 PM    Clinical History: The Female patient is 80years old  presenting for pain  following fall. Technique: Thin section axial images were obtained through the lumbar spine. To optimally  assess the posterior elements and vertebral bodies as well as to assess  vertebral body alignment, the original axial data was used to create coronal and  sagittal multiplanar reformatted images. The axial and reformatted data was  available for review.     All CT scans at this facility are performed using dose reduction/dose modulation  techniques, as appropriate the performed exam, including the following:   Automated Exposure

## 2023-10-10 ENCOUNTER — TELEPHONE (OUTPATIENT)
Dept: ORTHOPEDIC SURGERY | Age: 88
End: 2023-10-10

## 2023-10-10 NOTE — TELEPHONE ENCOUNTER
Pt is having a lot of burning and numbness  In her right hip. Pt's daughter Tila Griffiths is concerned is there any way you can work  Her in soon. [phone]

## 2023-10-13 ENCOUNTER — OFFICE VISIT (OUTPATIENT)
Dept: ORTHOPEDIC SURGERY | Age: 88
End: 2023-10-13

## 2023-10-13 DIAGNOSIS — M16.11 PRIMARY OSTEOARTHRITIS OF RIGHT HIP: ICD-10-CM

## 2023-10-13 DIAGNOSIS — M25.551 RIGHT HIP PAIN: Primary | ICD-10-CM

## 2023-10-13 DIAGNOSIS — M54.50 ACUTE LOW BACK PAIN, UNSPECIFIED BACK PAIN LATERALITY, UNSPECIFIED WHETHER SCIATICA PRESENT: ICD-10-CM

## 2023-10-13 DIAGNOSIS — M47.816 LUMBAR SPONDYLOSIS: ICD-10-CM

## 2023-10-13 RX ORDER — TRIAMCINOLONE ACETONIDE 40 MG/ML
40 INJECTION, SUSPENSION INTRA-ARTICULAR; INTRAMUSCULAR ONCE
Status: COMPLETED | OUTPATIENT
Start: 2023-10-13 | End: 2023-10-13

## 2023-10-13 RX ADMIN — TRIAMCINOLONE ACETONIDE 40 MG: 40 INJECTION, SUSPENSION INTRA-ARTICULAR; INTRAMUSCULAR at 09:10

## 2023-10-13 NOTE — PROGRESS NOTES
Name: Meka Elise  YOB: 1928  Gender: female  MRN: [id number]    CC:   Chief Complaint   Patient presents with    Joint Pain     Right hip pain will xray today- CT of L-spine in chart from 9/15         HPI:   The pain has been present for several weeks and is becoming worse. She was initially seen in the ED for lumbar radicular symptoms but symptoms have progressed into her right groin and thigh region. It hurts at night when sleeping. There was not an acute injury to the hip. The pain is located over the right side low back, lateral right hip, groin, and thigh area. It hurts to walk and pain worsens with increased distance. The pain does radiate down the leg at times. Numbness and tingling are occasionally noted. The patient is now having difficulty putting socks and shoes on. Treatment so far has been PT, Tylenol, and oral steroids provided by the ED. H/o left MARSHALL in 2017 which is doing well. Review of Systems  As per HPI. Pertinent positives and negatives are addressed with the patient, particularly those related to musculoskeletal concerns. Non-orthopaedic concerns were referred back to the primary care physician. [address] [place] [address]:    Current Outpatient Medications:     aspirin 81 MG chewable tablet, Take 81 mg by mouth daily, Disp: , Rfl:     atorvastatin (LIPITOR) 20 MG tablet, Take by mouth daily, Disp: , Rfl:     furosemide (LASIX) 20 MG tablet, Take 20 mg by mouth daily as needed (Patient not taking: Reported on 9/13/2023), Disp: , Rfl:     gabapentin (NEURONTIN) 100 MG capsule, Take by mouth 3 times daily. , Disp: , Rfl:     mirtazapine (REMERON) 30 MG tablet, Take by mouth, Disp: , Rfl:   Allergies   Allergen Reactions    Codeine Other (See Comments)     GI upset    Penicillins Rash     Past Medical History:   Diagnosis Date    Anemia     EGD 10-3-12:  schatzki's ring, duodenal ulcer, gastritis. Colonoscopy 10-3-12: diverticulosis, colon polyp.   repeat colonoscopy 10

## 2023-10-16 ENCOUNTER — TELEPHONE (OUTPATIENT)
Dept: ORTHOPEDIC SURGERY | Age: 88
End: 2023-10-16

## 2023-10-16 NOTE — TELEPHONE ENCOUNTER
Daughter stated that her mom is in so much pain despite getting the IA inj. Her mom is also taking Flexoril, Gabapentin and had gotten Morphine at the ER when she went. None of this is helping with the pain/discomfort. I explained that if the IA inj did not help that she really needs to f/u with Spine. I will message Lavern Davis to see if Esperanza can see pat sooner than next Thursday.

## 2023-10-16 NOTE — TELEPHONE ENCOUNTER
Her daughter is calling regarding the injection she had on Friday. It helped for 2 days and now the pain is back and also shooting up into her ribs. Please call. pain controlled, afebrile    abdominal wound - vac in place  RLE  thigh vac in place  surgical wound intact with stable eschar, some separation of eschar medially from skin, no exudate, dressed by prs/wound care  5/5 ta/ehl/gcs  silt l4-s1  2+ dp pulse

## 2023-10-18 ENCOUNTER — OFFICE VISIT (OUTPATIENT)
Dept: ORTHOPEDIC SURGERY | Age: 88
End: 2023-10-18

## 2023-10-18 DIAGNOSIS — M51.36 DDD (DEGENERATIVE DISC DISEASE), LUMBAR: ICD-10-CM

## 2023-10-18 DIAGNOSIS — M48.062 LUMBAR STENOSIS WITH NEUROGENIC CLAUDICATION: ICD-10-CM

## 2023-10-18 DIAGNOSIS — M54.16 LUMBAR RADICULOPATHY: Primary | ICD-10-CM

## 2023-10-18 DIAGNOSIS — M51.16 LUMBAR DISC HERNIATION WITH RADICULOPATHY: ICD-10-CM

## 2023-10-18 RX ORDER — CYCLOBENZAPRINE HCL 5 MG
5 TABLET ORAL 2 TIMES DAILY PRN
COMMUNITY
Start: 2023-10-09 | End: 2023-10-18 | Stop reason: SDUPTHER

## 2023-10-18 RX ORDER — CYCLOBENZAPRINE HCL 5 MG
5 TABLET ORAL 2 TIMES DAILY PRN
Qty: 24 TABLET | Refills: 0 | Status: SHIPPED | OUTPATIENT
Start: 2023-10-18

## 2023-10-18 NOTE — PROGRESS NOTES
An order for MRI of the Lumbar spine has been ordered.
bilateral hip(s) reveals no irritability. She really has decent range of motion of the right hip and there is no significant pain with range of motion. She has decreased hip flexion on the right. NEURO:  Cranial nerves grossly intact. No motor deficits. Straight leg testing is positive right  Sensory testing reveals intact sensation to light touch and in the distribution of the L3-S1 dermatomes bilaterally  Ankle jerk is negative for clonus    Reflexes   Right Left   Quadriceps (L4) 2 2   Achilles (S1) 2 2     Strength testing in the lower extremity reveals the following based on the 5 point grading scale:     HF (L2) H Ab (L5) KE (L3/4) ADF (L4) EHL (L5) A Ev (S1) APF (S1)   Right 5 3- 5 5 5 5 5   Left 5 5 5 5 5 5 5     PSYCH:  Alert and oriented X 3. Appropriate affect. Intact judgment and insight. Radiographic Studies:     Independent interpretation of  AP, lateral and spot views of the lumbar spine: X-rays from October 13, 2023 show normal alignment on the AP view. Pelvis is level. 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 Left hip with total hip arthroplasty . Sagittal view of the lumbar spine shows multilevel degenerative changes and facet arthropathy. No anterior listhesis or fracture noted. Also independently reviewed a CT scan From September 15, 2023 of the lumbar spine and I do appreciate right lateral recess behind the L3 vertebral body appears to be a large disc herniation difficult to tell if this is coming from L4-3 disc with herniation or L3-4 but there does appear to be multilevel spinal stenosis of the lumbar spine but the severity is not able to be assessed with the noncontrasted CT scan. Assessment/Plan:         Diagnosis Orders   1. Lumbar radiculopathy  MRI LUMBAR SPINE WO CONTRAST      2. DDD (degenerative disc disease), lumbar  MRI LUMBAR SPINE WO CONTRAST      3. Lumbar disc herniation with radiculopathy  MRI LUMBAR SPINE WO CONTRAST      4.  Lumbar stenosis with neurogenic

## 2023-10-30 ENCOUNTER — OFFICE VISIT (OUTPATIENT)
Dept: ORTHOPEDIC SURGERY | Age: 88
End: 2023-10-30
Payer: MEDICARE

## 2023-10-30 DIAGNOSIS — M43.16 SPONDYLOLISTHESIS OF LUMBAR REGION: ICD-10-CM

## 2023-10-30 DIAGNOSIS — M48.062 LUMBAR STENOSIS WITH NEUROGENIC CLAUDICATION: Primary | ICD-10-CM

## 2023-10-30 DIAGNOSIS — M54.16 LUMBAR RADICULOPATHY: ICD-10-CM

## 2023-10-30 DIAGNOSIS — M51.16 LUMBAR DISC HERNIATION WITH RADICULOPATHY: ICD-10-CM

## 2023-10-30 PROCEDURE — 1090F PRES/ABSN URINE INCON ASSESS: CPT | Performed by: PHYSICIAN ASSISTANT

## 2023-10-30 PROCEDURE — 1123F ACP DISCUSS/DSCN MKR DOCD: CPT | Performed by: PHYSICIAN ASSISTANT

## 2023-10-30 PROCEDURE — G8419 CALC BMI OUT NRM PARAM NOF/U: HCPCS | Performed by: PHYSICIAN ASSISTANT

## 2023-10-30 PROCEDURE — G8484 FLU IMMUNIZE NO ADMIN: HCPCS | Performed by: PHYSICIAN ASSISTANT

## 2023-10-30 PROCEDURE — 99214 OFFICE O/P EST MOD 30 MIN: CPT | Performed by: PHYSICIAN ASSISTANT

## 2023-10-30 PROCEDURE — G8428 CUR MEDS NOT DOCUMENT: HCPCS | Performed by: PHYSICIAN ASSISTANT

## 2023-10-30 PROCEDURE — 1036F TOBACCO NON-USER: CPT | Performed by: PHYSICIAN ASSISTANT

## 2023-10-30 RX ORDER — ONDANSETRON 4 MG/1
TABLET, FILM COATED ORAL
COMMUNITY
Start: 2023-09-14

## 2023-10-30 RX ORDER — ATORVASTATIN CALCIUM 40 MG/1
40 TABLET, FILM COATED ORAL DAILY
COMMUNITY
Start: 2023-09-01

## 2023-10-30 RX ORDER — GABAPENTIN 300 MG/1
CAPSULE ORAL
COMMUNITY
Start: 2023-10-19

## 2023-10-30 NOTE — PROGRESS NOTES
Name: Lux Kim  YOB: 1928  Gender: female  MRN: [id number]    CC: Back Pain (MRI results)       HPI: This is a 80y.o. year old female who presents with 4-week history of right anterior leg pain. This took her to the emergency department on 2023 and again . Pain is severe in the right buttock radiates into the right anterior thigh. There is a burning pain. The leg is giving away with her. She has numbness in the anterior leg below the knee as well. She attempted physical therapy and has not had relief and they recommended further evaluation before further treatment was initiated. She did see Kern Apgar who obtained x-rays of the lumbar spine and hip. 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 Intra-articular hip injection was given. She did have about 2 days of some improvement however with the pain returning in just 2 days and radiating all the way down the anterior leg to her ankle it was recommended that she follow-up with spine for further evaluation. I was able to review the CT scan from 2023 that I felt I saw a disc herniation on the right L2-3 and felt that this was likely the source of her pain he was not fully visualized so we ordered an MRI scan for further evaluation. Prior treatment: Intra-articular steroid injection, oral steroids, Flexeril, Aleve, lidocaine patches, attempted physical therapy           10/18/2023    11:46 AM   AMB PAIN ASSESSMENT   Location of Pain Back    Severity of Pain 8   Quality of Pain Aching; Sharp   Duration of Pain Persistent   Frequency of Pain Constant   Date Pain First Started 2023   Limiting Behavior Some   Relieving Factors Other (Comment); Nsaids    Result of Injury No   Work-Related Injury No   Are there other pain locations you wish to document? No       Significant value            ROS/Meds/PSH/PMH/FH/SH: I personally reviewed the patient's collected intake data.   Below are the

## 2023-11-06 ENCOUNTER — OFFICE VISIT (OUTPATIENT)
Dept: ORTHOPEDIC SURGERY | Age: 88
End: 2023-11-06
Payer: MEDICARE

## 2023-11-06 DIAGNOSIS — M51.16 LUMBAR DISC HERNIATION WITH RADICULOPATHY: ICD-10-CM

## 2023-11-06 DIAGNOSIS — M48.062 LUMBAR STENOSIS WITH NEUROGENIC CLAUDICATION: Primary | ICD-10-CM

## 2023-11-06 DIAGNOSIS — M54.16 LUMBAR RADICULOPATHY: ICD-10-CM

## 2023-11-06 PROCEDURE — 64483 NJX AA&/STRD TFRM EPI L/S 1: CPT | Performed by: PHYSICAL MEDICINE & REHABILITATION

## 2023-11-06 PROCEDURE — 64484 NJX AA&/STRD TFRM EPI L/S EA: CPT | Performed by: PHYSICAL MEDICINE & REHABILITATION

## 2023-11-06 RX ORDER — TRIAMCINOLONE ACETONIDE 40 MG/ML
40 INJECTION, SUSPENSION INTRA-ARTICULAR; INTRAMUSCULAR ONCE
Status: COMPLETED | OUTPATIENT
Start: 2023-11-06 | End: 2023-11-06

## 2023-11-06 RX ADMIN — TRIAMCINOLONE ACETONIDE 40 MG: 40 INJECTION, SUSPENSION INTRA-ARTICULAR; INTRAMUSCULAR at 13:36

## 2023-11-06 NOTE — PROGRESS NOTES
claudication  M48.062 FL NERVE BLOCK LUMBOSACRAL 1ST     FL NERVE BLOCK LUMBOSACRAL EACH ADD     triamcinolone acetonide (KENALOG-40) injection 40 mg      2. Lumbar radiculopathy  M54.16 FL NERVE BLOCK LUMBOSACRAL 1ST     FL NERVE BLOCK LUMBOSACRAL EACH ADD     triamcinolone acetonide (KENALOG-40) injection 40 mg      3.  Lumbar disc herniation with radiculopathy  M51.16 FL NERVE BLOCK LUMBOSACRAL 1ST     FL NERVE BLOCK LUMBOSACRAL EACH ADD     triamcinolone acetonide (KENALOG-40) injection 40 mg         Juanis Cannon MD  11/06/23

## 2023-12-06 ENCOUNTER — OFFICE VISIT (OUTPATIENT)
Dept: ORTHOPEDIC SURGERY | Age: 88
End: 2023-12-06
Payer: MEDICARE

## 2023-12-06 DIAGNOSIS — M48.061 FORAMINAL STENOSIS OF LUMBAR REGION: ICD-10-CM

## 2023-12-06 DIAGNOSIS — M51.16 LUMBAR DISC HERNIATION WITH RADICULOPATHY: Primary | ICD-10-CM

## 2023-12-06 PROCEDURE — 1090F PRES/ABSN URINE INCON ASSESS: CPT | Performed by: PHYSICIAN ASSISTANT

## 2023-12-06 PROCEDURE — G8419 CALC BMI OUT NRM PARAM NOF/U: HCPCS | Performed by: PHYSICIAN ASSISTANT

## 2023-12-06 PROCEDURE — G8428 CUR MEDS NOT DOCUMENT: HCPCS | Performed by: PHYSICIAN ASSISTANT

## 2023-12-06 PROCEDURE — 1123F ACP DISCUSS/DSCN MKR DOCD: CPT | Performed by: PHYSICIAN ASSISTANT

## 2023-12-06 PROCEDURE — 1036F TOBACCO NON-USER: CPT | Performed by: PHYSICIAN ASSISTANT

## 2023-12-06 PROCEDURE — G8484 FLU IMMUNIZE NO ADMIN: HCPCS | Performed by: PHYSICIAN ASSISTANT

## 2023-12-06 PROCEDURE — 99213 OFFICE O/P EST LOW 20 MIN: CPT | Performed by: PHYSICIAN ASSISTANT

## 2023-12-06 NOTE — PROGRESS NOTES
stenosis. 3.  At L2-3, a right subarticular disc extrusion with caudal migration of disc  material contributes to moderate to severe spinal canal stenosis. Mild bilateral  neural foraminal narrowing. 4.  At L3-L4, there is mild to moderate spinal canal stenosis with moderate  right neural foraminal narrowing. I independently reviewed the MRI of the lumbar spine. L2-3 there is facet arthropathy and ligamentum hypertrophy there is a right disc extrusion with caudal migration along the right posterior margin of the L3 vertebra with compression on the L3 nerve. No significant foraminal narrowing. L3-4 we still are able to see there extruded disc material from the L2-3 disc approaching this level on the right around the foramen and lateral recess. Assessment/Plan:         Diagnosis Orders   1. Lumbar disc herniation with radiculopathy        2. Foraminal stenosis of lumbar region            This patient's clinical history and physical exam is consistent with a right  L-3 -4 lumbar radiculopathy. MRI scan confirmed disc herniation which is large extruded disc material on the right lateral recess coming from the L2-3 disc but it compresses L3 nerve and L4 nerve. She responded extremely well to the selective nerve root blocks. We discussed that we can repeat these if needed up to 3-4 times a year. This did confirm the source of her pain and if we need to discuss discectomy I can refer her to one of the surgeons. - Patient will call for lumbar steroid injection and we will schedule a right L3 and 4 selective nerve root block  -Future surgery: If the patient fails the conservative options listed above, I believe they may be a candidate for a  right L2-3 discectomy or right L2-3 hemilaminectomy    4 This is an acute complicated injury    No orders of the defined types were placed in this encounter. No orders of the defined types were placed in this encounter. Return for call for injection.

## 2024-01-10 ENCOUNTER — OFFICE VISIT (OUTPATIENT)
Dept: ORTHOPEDIC SURGERY | Age: 89
End: 2024-01-10
Payer: MEDICARE

## 2024-01-10 DIAGNOSIS — M51.16 LUMBAR DISC HERNIATION WITH RADICULOPATHY: ICD-10-CM

## 2024-01-10 DIAGNOSIS — M48.061 FORAMINAL STENOSIS OF LUMBAR REGION: ICD-10-CM

## 2024-01-10 DIAGNOSIS — M54.16 LUMBAR RADICULOPATHY: ICD-10-CM

## 2024-01-10 DIAGNOSIS — M54.50 LOW BACK PAIN, UNSPECIFIED BACK PAIN LATERALITY, UNSPECIFIED CHRONICITY, UNSPECIFIED WHETHER SCIATICA PRESENT: Primary | ICD-10-CM

## 2024-01-10 PROCEDURE — 1123F ACP DISCUSS/DSCN MKR DOCD: CPT | Performed by: PHYSICIAN ASSISTANT

## 2024-01-10 PROCEDURE — G8484 FLU IMMUNIZE NO ADMIN: HCPCS | Performed by: PHYSICIAN ASSISTANT

## 2024-01-10 PROCEDURE — 1036F TOBACCO NON-USER: CPT | Performed by: PHYSICIAN ASSISTANT

## 2024-01-10 PROCEDURE — G8428 CUR MEDS NOT DOCUMENT: HCPCS | Performed by: PHYSICIAN ASSISTANT

## 2024-01-10 PROCEDURE — G8419 CALC BMI OUT NRM PARAM NOF/U: HCPCS | Performed by: PHYSICIAN ASSISTANT

## 2024-01-10 PROCEDURE — 99214 OFFICE O/P EST MOD 30 MIN: CPT | Performed by: PHYSICIAN ASSISTANT

## 2024-01-10 PROCEDURE — 1090F PRES/ABSN URINE INCON ASSESS: CPT | Performed by: PHYSICIAN ASSISTANT

## 2024-01-10 RX ORDER — CYCLOBENZAPRINE HCL 5 MG
5 TABLET ORAL 2 TIMES DAILY PRN
Qty: 24 TABLET | Refills: 0 | Status: SHIPPED | OUTPATIENT
Start: 2024-01-10

## 2024-01-10 NOTE — PROGRESS NOTES
Name: Marilee Epstein  YOB: 1928  Gender: female  MRN: [id number]    CC: Back Pain (Recheck)       HPI: This is a 89 y.o. year old female who presents with 4-week history of right anterior leg pain.  This took her to the emergency department on September 13, 2023 and again September 16.  Pain is severe in the right buttock radiates into the right anterior thigh.  There is a burning pain.  The leg is giving away with her.  She has numbness in the anterior leg below the knee as well.  She attempted physical therapy and has not had relief and they recommended further evaluation before further treatment was initiated.  She did see Leobardo Alex who obtained x-rays of the lumbar spine and hip.  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  Intra-articular hip injection was given.  She did have about 2 days of some improvement however with the pain returning in just 2 days and radiating all the way down the anterior leg to her ankle it was recommended that she follow-up with spine for further evaluation.  I was able to review the CT scan from September 2023 that I felt I saw a disc herniation on the right L2-3 and felt that this was likely the source of her pain he was not fully visualized so we ordered an MRI scan for further evaluation. L2-3 there is facet arthropathy and ligamentum hypertrophy there is a right disc extrusion with caudal migration along the right posterior margin of the L3 vertebra with compression on the L3 nerve.  No significant foraminal narrowing.  L3-4 we still are able to see there extruded disc material from the L2-3 disc approaching this level on the right around the foramen and lateral     Prior treatment: Intra-articular steroid injection, oral steroids, Flexeril, Aleve, lidocaine patches, attempted physical therapy  11/6/23 she had right L3 and 4 selective nerve root block.  She returns today reporting 100% relief of her pain.  The injection worked very quickly and she has not

## 2024-01-10 NOTE — PATIENT INSTRUCTIONS
the injection from the physician treating you with the blood thinners prior to the appointment date.  If your physician tells you it is not safe to stop the blood thinner, you must call our office and discuss this with us.  We may need to cancel the procedure.  - Please do not take any nonsteroidal anti-inflammatory medications (NSAIDs) such as Advil, ibuprofen, Celebrex, meloxicam or Aleve for 3 days before your injection.    - We cannot give you an injection if you are presently taking an antibiotic.  - Please continue your other medications as prescribed.-You must bring someone to drive you home.  - You may eat prior to your procedure, but we asked that you do not eat a heavy meal within 2 to 4 hours of your procedure.  You may drink liquids up to 1 to 2 hours before your appointment time.  - If you are diabetic, please adjust your medications as appropriate.  If steroid is used be aware that they may increase your blood sugar.  Closely monitor your blood sugars after the procedure.  - If you are sick, running a fever, have a virus or are put on antibiotics during the week before your procedure, please call to reschedule.  We cannot do injections if you are sick.      Day of procedure    - Arrive 15 minutes before your procedure time, register at the .  - A staff member will call you from the waiting area and bring you to the exam room.  - You may or may not be asked to change into shorts.  Please leave valuables at home.  If you wear clothing with elastic waist, you will not be required to change.  - The nurse will talk with you and have you sign a consent form before your injection.  If you have any allergies to Betadine, iodine, shellfish or x-ray dye, please tell the nurse at this time.  - You will be positioned on the table on your stomach.  A special x-ray machine is used to delia the correct position of the needle.  We will clean the area with Betadine or Hibiclens.  Sterile towels were placed

## 2024-02-27 NOTE — PERIOP NOTES
PT'S DAUGHTER GRGEORY MAY BE REACHED -[id number]. Pt is an 85 yo F with PMHx  sideroblastic anemia breast cancer (s/p left lumpectomy, radiation and tamoxifen, thyrotoxicosis s/p thyroidectomy, HTN, HLD, MDS presents to the ED with weakness and fatigue. Pt states on Feb 12th had increased urinary frequency, went to  and was prescribed 7 day course of Bactrim for UTI. Initially pt had relief for 1 week post abx course, however began feeling fatigued and returned to the UC who started her on Macrobid. She felt extremely nauseous with the medication, so stopped after a few days. Pt felt progressively weak so she went to her primary (NP) who ordered labs, which were significant for leukocytosis, elevated Cr Pts primary sent pt in for further evaluation and r/o sepsis.    Denies fever, chills, cough, abdominal pain, vomiting, diarrhea, constipation, dysuria      ED Course:   Vitals: BP: 135/65, HR: 86, Temp: 97.8, RR: 16 , SpO2: 98% on RA   Labs: WBC 8.16, Hg 7.6, K 5.7, BUN/Cr 65/4.1, lactate .6, procal 2.71  UA: Moderate LEC, 20 WBC, few bacteria, + squamous epi  Bladder scan: 50cc residual  CXR: < from: Xray Chest 1 View-PORTABLE IMMEDIATE (02.27.24 @ 16:28) >  1. Linear scarring in the left upper lobe is unchanged from the prior   exam.  2. No evidence of pneumonia, pleural effusion or CHF.  3. Surgical clips medial to the left apex, unchanged.    EKG:   Received in the ED: LR bolus 1000cc x1   Pt is an 85 yo F with PMHx  sideroblastic anemia, breast cancer (s/p left lumpectomy, radiation and tamoxifen, thyrotoxicosis s/p thyroidectomy, HTN, HLD, MDS presents to the ED with weakness and fatigue. Pt states on Feb 12th had increased urinary frequency, went to  and was prescribed 7 day course of Bactrim for UTI. Initially pt had relief for 1 week post abx course, however began feeling fatigued and returned to the UC who started her on Macrobid. She felt extremely nauseous with the medication, so stopped after a few days. Pt felt progressively weak so she went to her primary (NP) who ordered labs, which were significant for leukocytosis, elevated Cr Pts primary sent pt in for further evaluation and r/o sepsis.    Denies fever, chills, cough, abdominal pain, vomiting, diarrhea, constipation, dysuria      ED Course:   Vitals: BP: 135/65, HR: 86, Temp: 97.8, RR: 16 , SpO2: 98% on RA   Labs: WBC 8.16, Hg 7.6, K 5.7, BUN/Cr 65/4.1, lactate .6, procal 2.71  UA: Moderate LEC, 20 WBC, few bacteria, + squamous epi  Bladder scan: 50cc residual  CXR: < from: Xray Chest 1 View-PORTABLE IMMEDIATE (02.27.24 @ 16:28) >  1. Linear scarring in the left upper lobe is unchanged from the prior   exam.  2. No evidence of pneumonia, pleural effusion or CHF.  3. Surgical clips medial to the left apex, unchanged.    EKG:   Received in the ED: LR bolus 1000cc x1   Pt is an 85 yo F with PMHx  sideroblastic anemia, breast cancer (s/p left lumpectomy, radiation and tamoxifen, thyrotoxicosis s/p thyroidectomy, HTN, HLD, MDS presents to the ED with weakness and fatigue. Pt states on Feb 12th had increased urinary frequency, went to  and was prescribed 7 day course of Bactrim for UTI. Initially pt had relief for 1 week post abx course, however began feeling fatigued and returned to the UC who started her on Macrobid. She felt extremely nauseous with the medication, so stopped after a few days. Pt felt progressively weak so she went to her primary (NP) who ordered labs, which were significant for leukocytosis, elevated Cr Pts primary sent pt in for further evaluation and r/o sepsis.    Denies fever, chills, cough, abdominal pain, vomiting, diarrhea, constipation, dysuria      ED Course:   Vitals: BP: 135/65, HR: 86, Temp: 97.8, RR: 16 , SpO2: 98% on RA   Labs: WBC 8.16, Hg 7.6, K 5.7, BUN/Cr 65/4.1, lactate .6, procal 2.71  UA: Moderate LEC, 20 WBC, few bacteria, + squamous epi  Bladder scan: 50cc residual  CXR: < from: Xray Chest 1 View-PORTABLE IMMEDIATE (02.27.24 @ 16:28) >  1. Linear scarring in the left upper lobe is unchanged from the prior   exam.  2. No evidence of pneumonia, pleural effusion or CHF.  3. Surgical clips medial to the left apex, unchanged.     Received in the ED: LR bolus 1000cc x1

## 2024-05-21 ENCOUNTER — TELEPHONE (OUTPATIENT)
Dept: ORTHOPEDIC SURGERY | Age: 89
End: 2024-05-21

## 2024-05-22 ENCOUNTER — TELEPHONE (OUTPATIENT)
Dept: ORTHOPEDIC SURGERY | Age: 89
End: 2024-05-22

## 2024-05-22 NOTE — TELEPHONE ENCOUNTER
Patient's daughter, Mavis, is calling to schedule an injection, the patient is in severe pain and would like to come in ASAP please.  Mavis states that her mom is asking her to take her to the ER because of her pain level, but Mavis says she knows they won't be able to help her.

## 2024-05-23 ENCOUNTER — OFFICE VISIT (OUTPATIENT)
Dept: ORTHOPEDIC SURGERY | Age: 89
End: 2024-05-23
Payer: MEDICARE

## 2024-05-23 DIAGNOSIS — M54.16 LUMBAR RADICULOPATHY: Primary | ICD-10-CM

## 2024-05-23 PROCEDURE — 64484 NJX AA&/STRD TFRM EPI L/S EA: CPT | Performed by: PHYSICAL MEDICINE & REHABILITATION

## 2024-05-23 PROCEDURE — 64483 NJX AA&/STRD TFRM EPI L/S 1: CPT | Performed by: PHYSICAL MEDICINE & REHABILITATION

## 2024-05-23 RX ORDER — TRIAMCINOLONE ACETONIDE 40 MG/ML
40 INJECTION, SUSPENSION INTRA-ARTICULAR; INTRAMUSCULAR ONCE
Status: SHIPPED | OUTPATIENT
Start: 2024-05-23

## 2024-05-23 RX ORDER — TRIAMCINOLONE ACETONIDE 40 MG/ML
40 INJECTION, SUSPENSION INTRA-ARTICULAR; INTRAMUSCULAR ONCE
Status: COMPLETED | OUTPATIENT
Start: 2024-05-23 | End: 2024-05-23

## 2024-05-23 RX ADMIN — TRIAMCINOLONE ACETONIDE 40 MG: 40 INJECTION, SUSPENSION INTRA-ARTICULAR; INTRAMUSCULAR at 15:05

## 2024-05-23 NOTE — PROGRESS NOTES
Name: Marilee Epstein  YOB: 1928  Gender: female  MRN: [id number]        Transforaminal [facility] Procedure Note    Procedure: Right  L3-L4 and L4-L5 transforaminal epidural steroid injections     Precautions: Marilee Epstein denies prior sensitivity to steroid, local anesthetic, iodine, or shellfish.       Consent:  Consent was obtained prior to the procedure. The procedure was discussed at length with Marilee Epstein. She was given the opportunity to ask questions regarding the procedure and its associated risks.  In addition to the potential risks associated with the procedure itself, the patient was informed both verbally and in writing of potential side effects of the use glucocorticoids.  The patient appeared to comprehend the informed consent and desired to have the procedure performed, and informed consent was signed.     She was placed in a prone position on the fluoroscopy table and the skin was prepped and draped in a routine sterile fashion. The areas to be injected were each anesthetized with 1 ml of 1% Lidocaine. A 22 gauge 3.5 inch spinal needle was carefully advanced under fluoroscopic guidance to the right L3-L4 transforaminal space  0.5 ml of 70% of Omnipaque was injected to confirm proper needle placement and absence of subdural or vascular flow Once proper placement was confirmed, 0.5ml of 0.25 marcaine and 40 mg kenalog were injected through the spinal needle.       The above procedure was then repeated at the Right  L4-L5 transforaminal space.    Fluoroscopic guidance was used intermittently over a 10-minute period to insure proper needle placement and her safety. A hard copy of the fluoroscopic image has been placed in her chart and is saved on the C-arm hard drive. She was monitored for 30 minutes after the procedure and discharged home in a stable fashion with a routine follow up.    Procedural Diagnosis:     ICD-10-CM    1. Lumbar radiculopathy  M54.16 FL

## 2024-05-24 ENCOUNTER — TELEPHONE (OUTPATIENT)
Dept: ORTHOPEDIC SURGERY | Age: 89
End: 2024-05-24

## 2024-05-24 NOTE — TELEPHONE ENCOUNTER
Called pt back and spoke with daughter injections take 7-10 days for full effectiveness, she will call 2-3 weeks if pt is still having pain

## 2024-05-28 ENCOUNTER — TELEPHONE (OUTPATIENT)
Dept: ORTHOPEDIC SURGERY | Age: 89
End: 2024-05-28

## 2024-05-28 NOTE — TELEPHONE ENCOUNTER
Daughter calling to say she is till in lots of pain and wants tos ee if she can get any kind of medication for pain. Please call Mavis [phone]

## 2024-05-29 ENCOUNTER — TELEPHONE (OUTPATIENT)
Dept: ORTHOPEDIC SURGERY | Age: 89
End: 2024-05-29

## 2024-05-29 ENCOUNTER — PATIENT MESSAGE (OUTPATIENT)
Age: 89
End: 2024-05-29

## 2024-05-29 DIAGNOSIS — M48.061 FORAMINAL STENOSIS OF LUMBAR REGION: Primary | ICD-10-CM

## 2024-05-29 RX ORDER — TRAMADOL HYDROCHLORIDE 50 MG/1
50 TABLET ORAL EVERY 6 HOURS PRN
Qty: 20 TABLET | Refills: 0 | Status: SHIPPED | OUTPATIENT
Start: 2024-05-29 | End: 2024-06-03

## 2024-05-29 NOTE — TELEPHONE ENCOUNTER
From: Marilee Epstein  To: Esperanza Webster  Sent: 5/29/2024 8:46 AM EDT  Subject: Asuncion Epstein    Hey just wanted to follow up after my moms Back injection last Thursday May 23. She was hurting before we came so got shot and she has been in excuriating pain since then. I have called three time spoke with DR Calle assistant and said they could not get shot for 7 days again. I ask for pain meds to get her thru and not return call. called off twice who sent message again and still no response. I am not sure what to do. She is still in a great amount of pain and has been sitting in a chair since last Thursday not able to do anything. I understand about the shot not getting it so soon but is there not anything that she can get to help with the pain. I could go to ER but did that the firest time this happend and what they gave her did nothing.     Please advise. Thanks

## 2024-05-29 NOTE — TELEPHONE ENCOUNTER
Mavis called back and stated that she hasn't heard back from anyone.  From Stephie's last note on 5/29/24, it states that the patient can be scheduled with Dr. Alves since the last injection didn't help the patient.  I scheduled the patient with Dr. Alves for 5/31/24, and Mavis asks for a return call please.  She states that she missed the return call today because her phone didn't ring.  She states that her Mom is in excruciating pain.

## 2024-05-29 NOTE — TELEPHONE ENCOUNTER
Called daughter back no and lvm, as per brandan pt needs an appointment with the surgeon dr casanova and we can send her some tramadol if pt can tolerate.

## 2024-05-30 NOTE — TELEPHONE ENCOUNTER
I returned call spoke to Mavis Banks states she didn't know Tramadol was sent to pharmacy and picked it up today. States her mother has na appointment with Dr. Alves tomorrow. She is aware if pain is unbearable to go to the ER; she verbalized understanding.

## 2024-05-31 ENCOUNTER — OFFICE VISIT (OUTPATIENT)
Age: 89
End: 2024-05-31
Payer: MEDICARE

## 2024-05-31 DIAGNOSIS — M54.16 LUMBAR RADICULOPATHY: Primary | ICD-10-CM

## 2024-05-31 PROCEDURE — 1090F PRES/ABSN URINE INCON ASSESS: CPT | Performed by: ORTHOPAEDIC SURGERY

## 2024-05-31 PROCEDURE — 1123F ACP DISCUSS/DSCN MKR DOCD: CPT | Performed by: ORTHOPAEDIC SURGERY

## 2024-05-31 PROCEDURE — G8427 DOCREV CUR MEDS BY ELIG CLIN: HCPCS | Performed by: ORTHOPAEDIC SURGERY

## 2024-05-31 PROCEDURE — G8419 CALC BMI OUT NRM PARAM NOF/U: HCPCS | Performed by: ORTHOPAEDIC SURGERY

## 2024-05-31 PROCEDURE — 1036F TOBACCO NON-USER: CPT | Performed by: ORTHOPAEDIC SURGERY

## 2024-05-31 PROCEDURE — 99213 OFFICE O/P EST LOW 20 MIN: CPT | Performed by: ORTHOPAEDIC SURGERY

## 2024-05-31 RX ORDER — GABAPENTIN 100 MG/1
100 CAPSULE ORAL NIGHTLY
Qty: 30 CAPSULE | Refills: 2 | Status: SHIPPED | OUTPATIENT
Start: 2024-05-31 | End: 2024-08-29

## 2024-05-31 NOTE — PROGRESS NOTES
Name: Marilee Epstein  YOB: 1928  Gender: female  MRN: [id number]  Age: 89 y.o.      Chief Complaint: Low back pain and right leg pain    History of Present Illness:      This is a very pleasant 89 y.o. female who presents with a history of low back and right leg pain since October 2023.  States the pain is 10/10 in severity and constant.  She states the pain radiates down her anterior thigh to her knee.  She has tried an injection in November as well as in May which did not provide any meaningful relief.  She has been ambulating with a cane.  She has done physical therapy.  She does not smoke.  She does not have diabetes.  She does take aspirin 81 mg.      Medications:       Current Outpatient Medications:     traMADol (ULTRAM) 50 MG tablet, Take 1 tablet by mouth every 6 hours as needed for Pain for up to 5 days. Intended supply: 7 days. Take lowest dose possible to manage pain Max Daily Amount: 200 mg, Disp: 20 tablet, Rfl: 0    cyclobenzaprine (FLEXERIL) 5 MG tablet, Take 1 tablet by mouth 2 times daily as needed for Muscle spasms, Disp: 24 tablet, Rfl: 0    atorvastatin (LIPITOR) 40 MG tablet, Take 1 tablet by mouth daily, Disp: , Rfl:     gabapentin (NEURONTIN) 300 MG capsule, TAKE 1 CAPSULE BY MOUTH ONCE IN THE MORNING, THEN TAKE 1 CAPSULE BY MOUTH AT NOON, THEN TAKE 1 CAPSULE BY MOUTH IN THE EVENING, Disp: , Rfl:     ondansetron (ZOFRAN) 4 MG tablet, TAKE 1 TABLET BY MOUTH EVERY 8 HOURS AS NEEDED FOR NAUSEA OR VOMITING FOR 7 DAYS, Disp: , Rfl:     aspirin 81 MG chewable tablet, Take 1 tablet by mouth daily, Disp: , Rfl:     atorvastatin (LIPITOR) 20 MG tablet, Take by mouth daily, Disp: , Rfl:     gabapentin (NEURONTIN) 100 MG capsule, Take by mouth 3 times daily., Disp: , Rfl:     mirtazapine (REMERON) 30 MG tablet, Take by mouth, Disp: , Rfl:     Allergies:    Allergies   Allergen Reactions    Codeine Other (See Comments)     GI upset  GI upset      Hydrocodone-Acetaminophen Nausea

## 2024-06-06 ENCOUNTER — OFFICE VISIT (OUTPATIENT)
Dept: ORTHOPEDIC SURGERY | Age: 89
End: 2024-06-06
Payer: MEDICARE

## 2024-06-06 DIAGNOSIS — M51.16 LUMBAR DISC HERNIATION WITH RADICULOPATHY: ICD-10-CM

## 2024-06-06 DIAGNOSIS — M54.16 LUMBAR RADICULOPATHY: Primary | ICD-10-CM

## 2024-06-06 DIAGNOSIS — M48.062 LUMBAR STENOSIS WITH NEUROGENIC CLAUDICATION: ICD-10-CM

## 2024-06-06 PROCEDURE — 64483 NJX AA&/STRD TFRM EPI L/S 1: CPT | Performed by: PHYSICAL MEDICINE & REHABILITATION

## 2024-06-06 RX ORDER — TRIAMCINOLONE ACETONIDE 40 MG/ML
40 INJECTION, SUSPENSION INTRA-ARTICULAR; INTRAMUSCULAR ONCE
Status: COMPLETED | OUTPATIENT
Start: 2024-06-06 | End: 2024-06-06

## 2024-06-06 RX ADMIN — TRIAMCINOLONE ACETONIDE 40 MG: 40 INJECTION, SUSPENSION INTRA-ARTICULAR; INTRAMUSCULAR at 09:50

## 2024-06-06 NOTE — PROGRESS NOTES
Name: Marilee Epstein  YOB: 1928  Gender: female  MRN: [id number]        Transforaminal [facility] Procedure Note    Procedure: Right  L3-L4 transforaminal epidural steroid injections     Precautions: Marilee Epstein denies prior sensitivity to steroid, local anesthetic, iodine, or shellfish.       Consent:  Consent was obtained prior to the procedure. The procedure was discussed at length with Marilee Epstein. She was given the opportunity to ask questions regarding the procedure and its associated risks.  In addition to the potential risks associated with the procedure itself, the patient was informed both verbally and in writing of potential side effects of the use glucocorticoids.  The patient appeared to comprehend the informed consent and desired to have the procedure performed, and informed consent was signed.     She was placed in a prone position on the fluoroscopy table and the skin was prepped and draped in a routine sterile fashion. The areas to be injected were each anesthetized with 1 ml of 1% Lidocaine. A 22 gauge 3.5 inch spinal needle was carefully advanced under fluoroscopic guidance to the right L3-L4 transforaminal space  0.5 ml of 70% of Omnipaque was injected to confirm proper needle placement and absence of subdural or vascular flow Once proper placement was confirmed, 0.5ml of 0.25 marcaine and 40 mg kenalog were injected through the spinal needle.     Fluoroscopic guidance was used intermittently over a 10-minute period to insure proper needle placement and her safety. A hard copy of the fluoroscopic image has been placed in her chart and is saved on the C-arm hard drive. She was monitored for 30 minutes after the procedure and discharged home in a stable fashion with a routine follow up.    Procedural Diagnosis:     ICD-10-CM    1. Lumbar radiculopathy  M54.16 FL NERVE BLOCK LUMBOSACRAL 1ST     triamcinolone acetonide (KENALOG-40) injection 40 mg      2.

## 2024-06-25 ENCOUNTER — OFFICE VISIT (OUTPATIENT)
Age: 89
End: 2024-06-25

## 2024-06-25 DIAGNOSIS — M54.16 LUMBAR RADICULOPATHY: Primary | ICD-10-CM

## 2024-06-25 NOTE — PROGRESS NOTES
Name: Marilee Epstein  YOB: 1928  Gender: female  MRN: [id number]  Age: 89 y.o.      Chief Complaint: Injection follow-up    History of Present Illness:      This is a very pleasant 89 y.o. female who presents with a history of lumbar radiculopathy.  She had an L3 selective nerve root block which is provided under percent relief.  Currently she is happy.  Pain is controlled.  No pain      Medications:       Current Outpatient Medications:     gabapentin (NEURONTIN) 100 MG capsule, Take 1 capsule by mouth nightly for 90 days., Disp: 30 capsule, Rfl: 2    cyclobenzaprine (FLEXERIL) 5 MG tablet, Take 1 tablet by mouth 2 times daily as needed for Muscle spasms, Disp: 24 tablet, Rfl: 0    atorvastatin (LIPITOR) 40 MG tablet, Take 1 tablet by mouth daily, Disp: , Rfl:     ondansetron (ZOFRAN) 4 MG tablet, TAKE 1 TABLET BY MOUTH EVERY 8 HOURS AS NEEDED FOR NAUSEA OR VOMITING FOR 7 DAYS, Disp: , Rfl:     aspirin 81 MG chewable tablet, Take 1 tablet by mouth daily, Disp: , Rfl:     atorvastatin (LIPITOR) 20 MG tablet, Take by mouth daily, Disp: , Rfl:     mirtazapine (REMERON) 30 MG tablet, Take by mouth, Disp: , Rfl:     Allergies:    Allergies   Allergen Reactions    Codeine Other (See Comments)     GI upset  GI upset      Hydrocodone-Acetaminophen Nausea And Vomiting and Other (See Comments)    Penicillins Rash         Physical Exam:     This is a well developed well nourished female adult in no acute distress.     Mood and affect are appropriate.    Oriented to person, place, and time.    Respirations are unlabored and there is no evidence of cyanosis    The patient ambulates with an normal gait.     There is minimal hip irritability with internal or external rotation bilaterally.      Pulses in the lower extremities are palpable and equal  Sensory testing:     L2 L3 L4 L5 S1 S2   Right 2 2 2 2 2 2   Left 2 2 2 2 2 2     0=absent; 1=altered; 2=normal; NT= not tested  Reflexes    Reflexes   Right

## 2025-02-18 ENCOUNTER — OFFICE VISIT (OUTPATIENT)
Age: 89
End: 2025-02-18
Payer: MEDICARE

## 2025-02-18 DIAGNOSIS — M48.062 LUMBAR STENOSIS WITH NEUROGENIC CLAUDICATION: ICD-10-CM

## 2025-02-18 DIAGNOSIS — M54.16 LUMBAR RADICULOPATHY: Primary | ICD-10-CM

## 2025-02-18 DIAGNOSIS — M48.061 FORAMINAL STENOSIS OF LUMBAR REGION: ICD-10-CM

## 2025-02-18 DIAGNOSIS — M54.50 LOW BACK PAIN, UNSPECIFIED BACK PAIN LATERALITY, UNSPECIFIED CHRONICITY, UNSPECIFIED WHETHER SCIATICA PRESENT: ICD-10-CM

## 2025-02-18 PROCEDURE — 1090F PRES/ABSN URINE INCON ASSESS: CPT | Performed by: ORTHOPAEDIC SURGERY

## 2025-02-18 PROCEDURE — 1123F ACP DISCUSS/DSCN MKR DOCD: CPT | Performed by: ORTHOPAEDIC SURGERY

## 2025-02-18 PROCEDURE — G8427 DOCREV CUR MEDS BY ELIG CLIN: HCPCS | Performed by: ORTHOPAEDIC SURGERY

## 2025-02-18 PROCEDURE — 1159F MED LIST DOCD IN RCRD: CPT | Performed by: ORTHOPAEDIC SURGERY

## 2025-02-18 PROCEDURE — 99213 OFFICE O/P EST LOW 20 MIN: CPT | Performed by: ORTHOPAEDIC SURGERY

## 2025-02-18 PROCEDURE — 1036F TOBACCO NON-USER: CPT | Performed by: ORTHOPAEDIC SURGERY

## 2025-02-18 PROCEDURE — G8421 BMI NOT CALCULATED: HCPCS | Performed by: ORTHOPAEDIC SURGERY

## 2025-02-18 NOTE — PROGRESS NOTES
Name: Marilee Epstein  YOB: 1928  Gender: female  MRN: [id number]  Age: 89 y.o.      Chief Complaint: Low back pain right leg pain    History of Present Illness:      This is a very pleasant 89 y.o. female who presents with a acute history of low back pain and right leg pain.  Pain is 10/10 in severity.  Pain is worse with standing up.  Pain is relieved with sitting down.  She had a similar type pain when I last seen her and she was treated with a L3 and L4 selective nerve root block.        Medications:       Current Outpatient Medications:     gabapentin (NEURONTIN) 100 MG capsule, Take 1 capsule by mouth nightly for 90 days., Disp: 30 capsule, Rfl: 2    cyclobenzaprine (FLEXERIL) 5 MG tablet, Take 1 tablet by mouth 2 times daily as needed for Muscle spasms, Disp: 24 tablet, Rfl: 0    atorvastatin (LIPITOR) 40 MG tablet, Take 1 tablet by mouth daily, Disp: , Rfl:     ondansetron (ZOFRAN) 4 MG tablet, TAKE 1 TABLET BY MOUTH EVERY 8 HOURS AS NEEDED FOR NAUSEA OR VOMITING FOR 7 DAYS, Disp: , Rfl:     aspirin 81 MG chewable tablet, Take 1 tablet by mouth daily, Disp: , Rfl:     atorvastatin (LIPITOR) 20 MG tablet, Take by mouth daily, Disp: , Rfl:     mirtazapine (REMERON) 30 MG tablet, Take by mouth, Disp: , Rfl:     Allergies:    Allergies   Allergen Reactions    Codeine Other (See Comments)     GI upset  GI upset      Hydrocodone-Acetaminophen Nausea And Vomiting and Other (See Comments)    Penicillins Rash         Physical Exam:     This is a well developed well nourished female adult in no acute distress.     Mood and affect are appropriate.    Oriented to person, place, and time.    Respirations are unlabored and there is no evidence of cyanosis    Sensory testing:     L2 L3 L4 L5 S1 S2   Right 2 2 2 2 2 2   Left 2 2 2 2 2 2     0=absent; 1=altered; 2=normal; NT= not tested  Reflexes    Reflexes   Right Left   Quadriceps (L4) 2 2   Achilles (S1) 2 2     Strength testing in the lower

## 2025-02-19 ENCOUNTER — OFFICE VISIT (OUTPATIENT)
Dept: ORTHOPEDIC SURGERY | Age: 89
End: 2025-02-19
Payer: MEDICARE

## 2025-02-19 DIAGNOSIS — M54.16 LUMBAR RADICULOPATHY: Primary | ICD-10-CM

## 2025-02-19 DIAGNOSIS — M48.062 LUMBAR STENOSIS WITH NEUROGENIC CLAUDICATION: ICD-10-CM

## 2025-02-19 DIAGNOSIS — M48.061 FORAMINAL STENOSIS OF LUMBAR REGION: ICD-10-CM

## 2025-02-19 PROCEDURE — 64483 NJX AA&/STRD TFRM EPI L/S 1: CPT | Performed by: PHYSICAL MEDICINE & REHABILITATION

## 2025-02-19 PROCEDURE — 64484 NJX AA&/STRD TFRM EPI L/S EA: CPT | Performed by: PHYSICAL MEDICINE & REHABILITATION

## 2025-02-19 RX ORDER — TRIAMCINOLONE ACETONIDE 40 MG/ML
40 INJECTION, SUSPENSION INTRA-ARTICULAR; INTRAMUSCULAR ONCE
Status: COMPLETED | OUTPATIENT
Start: 2025-02-19 | End: 2025-02-19

## 2025-02-19 RX ADMIN — TRIAMCINOLONE ACETONIDE 40 MG: 40 INJECTION, SUSPENSION INTRA-ARTICULAR; INTRAMUSCULAR at 14:57

## 2025-02-19 NOTE — PROGRESS NOTES
Name: Marilee Epstein  YOB: 1928  Gender: female  MRN: [id number]        Transforaminal [facility] Procedure Note    Procedure: Right  L3-L4 and L4-L5 transforaminal epidural steroid injections     Precautions: Marilee Epstein denies prior sensitivity to steroid, local anesthetic, iodine, or shellfish.       Consent:  Consent was obtained prior to the procedure. The procedure was discussed at length with Marilee Epstein. She was given the opportunity to ask questions regarding the procedure and its associated risks.  In addition to the potential risks associated with the procedure itself, the patient was informed both verbally and in writing of potential side effects of the use glucocorticoids.  The patient appeared to comprehend the informed consent and desired to have the procedure performed, and informed consent was signed.     She was placed in a prone position on the fluoroscopy table and the skin was prepped and draped in a routine sterile fashion. The areas to be injected were each anesthetized with 1 ml of 1% Lidocaine. A 22 gauge 3.5 inch spinal needle was carefully advanced under fluoroscopic guidance to the right L4-L5 transforaminal space  0.5 ml of 70% of Omnipaque was injected to confirm proper needle placement and absence of subdural or vascular flow Once proper placement was confirmed, 0.5ml of 0.25 marcaine and 40 mg kenalog were injected through the spinal needle.       The above procedure was then repeated at the Right  L3-L4 transforaminal space.    Fluoroscopic guidance was used intermittently over a 10-minute period to insure proper needle placement and her safety. A hard copy of the fluoroscopic image has been placed in her chart and is saved on the C-arm hard drive. She was monitored for 30 minutes after the procedure and discharged home in a stable fashion with a routine follow up.    Procedural Diagnosis:     ICD-10-CM    1. Lumbar radiculopathy  M54.16 FL

## (undated) DEVICE — SOLUTION IRRIG 3000ML 0.9% SOD CHL FLX CONT 0797208] ICU MEDICAL INC]

## (undated) DEVICE — SUTURE PDS II SZ 1 L54IN ABSRB VLT L65MM TP-1 1/2 CIR Z879G

## (undated) DEVICE — FAN SPRAY KIT: Brand: PULSAVAC®

## (undated) DEVICE — TRAY CATH 16F DRN BG LTX -- CONVERT TO ITEM 363158

## (undated) DEVICE — HEWSON SUTURE RETRIEVER: Brand: HEWSON SUTURE RETRIEVER

## (undated) DEVICE — GOWN,REINF,POLY,ECL,PP SLV,XL: Brand: MEDLINE

## (undated) DEVICE — REM POLYHESIVE ADULT PATIENT RETURN ELECTRODE: Brand: VALLEYLAB

## (undated) DEVICE — TRAY PREP DRY W/ PREM GLV 2 APPL 6 SPNG 2 UNDPD 1 OVERWRAP

## (undated) DEVICE — BUTTON SWITCH PENCIL BLADE ELECTRODE, HOLSTER: Brand: EDGE

## (undated) DEVICE — Z DISCONTINUED USE 2744636  DRESSING AQUACEL 14 IN ALG W3.5XL14IN POLYUR FLM CVR W/ HYDRCOLL

## (undated) DEVICE — SUTURE MCRYL SZ 2-0 L27IN ABSRB UD CP-1 1 L36MM 1/2 CIR REV Y266H

## (undated) DEVICE — T4 HOOD

## (undated) DEVICE — SOLUTION IV 500ML 0.9% SOD CHL FLX CONT

## (undated) DEVICE — 2000CC GUARDIAN II: Brand: GUARDIAN

## (undated) DEVICE — SYR 50ML LR LCK 1ML GRAD NSAF --

## (undated) DEVICE — MEDI-VAC YANKAUER SUCTION HANDLE W/BULBOUS TIP: Brand: CARDINAL HEALTH

## (undated) DEVICE — STRYKER PERFORMANCE SERIES SAGITTAL BLADE: Brand: STRYKER PERFORMANCE SERIES

## (undated) DEVICE — UTILITY MARKER,BLACK WITH LABELS: Brand: DEVON

## (undated) DEVICE — GOWN,AURORA,FABRIC-REINFORCED,2XL: Brand: MEDLINE

## (undated) DEVICE — DRAPE,HIP,W/POUCHES,STERILE: Brand: MEDLINE

## (undated) DEVICE — SUTURE ETHBND EXCEL SZ 5 L30IN NONABSORBABLE GRN L40MM V-37 MB66G

## (undated) DEVICE — 3000CC GUARDIAN II: Brand: GUARDIAN

## (undated) DEVICE — NEEDLE HYPO 21GA L1.5IN INTRAMUSCULAR S STL LATCH BVL UP

## (undated) DEVICE — SOLUTION IV 1000ML 0.9% SOD CHL

## (undated) DEVICE — OSCILLATING TIP SAW CARTRIDGE: Brand: STRYKER PRECISION

## (undated) DEVICE — CURETTE BNE CEM 10IN DISP --

## (undated) DEVICE — BANDAGE COMPR SELF ADH 5 YDX4 IN TAN STRL PREMIERPRO LF

## (undated) DEVICE — BLADE SAW PAT RMR PILT H 46MM --

## (undated) DEVICE — 3M™ STERI-DRAPE™ INCISE DRAPE, XL 1051: Brand: STERI-DRAPE™

## (undated) DEVICE — STOCKINETTE TUBE 9X48 -- MEDICHOICE

## (undated) DEVICE — MCLASS OSCILLATING SAW BLADE 19 X 1.27 (0.050") X 90 MM: Brand: MCLASS

## (undated) DEVICE — SYR LR LCK 1ML GRAD NSAF 30ML --

## (undated) DEVICE — SUTURE PDS II SZ 1 L96IN ABSRB VLT TP-1 L65MM 1/2 CIR Z880G

## (undated) DEVICE — STOCKINETTE TUBE 6X48 -- MEDICHOICE

## (undated) DEVICE — SYSTEM SKIN CLSR 22CM DERMBND PRINEO

## (undated) DEVICE — JELLY LUBRICATING 10GM PREFIL SYR LUBE

## (undated) DEVICE — PACK PROCEDURE SURG TOT KNEE

## (undated) DEVICE — SUTURE PDS II SZ 1 L36IN ABSRB VLT L48MM CTX 1/2 CIR Z371T

## (undated) DEVICE — Z DISCONTINUED PER MEDLINE USE 2741944 DRESSING AQUACEL 12 IN SURG W9XL30CM SIL CVR WTRPRF VIR BACT BARR ANTIMIC

## (undated) DEVICE — DRAPE,U/SHT,SPLIT,FILM,60X84,STERILE: Brand: MEDLINE

## (undated) DEVICE — SUTURE VCRL SZ 1 L36IN ABSRB UD CTX L48MM 1/2 CIR J977H

## (undated) DEVICE — DRAPE,TOP,102X53,STERILE: Brand: MEDLINE

## (undated) DEVICE — BIPOLAR SEALER 23-112-1 AQM 6.0: Brand: AQUAMANTYS ®

## (undated) DEVICE — (D)PREP SKN CHLRAPRP APPL 26ML -- CONVERT TO ITEM 371833

## (undated) DEVICE — SLIM BODY SKIN STAPLER: Brand: APPOSE ULC

## (undated) DEVICE — SURGICAL PROCEDURE PACK TOT HIP CDS

## (undated) DEVICE — Device: Brand: STABLECUT®

## (undated) DEVICE — AMD ANTIMICROBIAL NON-ADHERENT PAD,0.2% POLYHEXAMETHYLENE BIGUANIDE HCI (PHMB): Brand: TELFA